# Patient Record
Sex: MALE | Race: WHITE | Employment: OTHER | URBAN - METROPOLITAN AREA
[De-identification: names, ages, dates, MRNs, and addresses within clinical notes are randomized per-mention and may not be internally consistent; named-entity substitution may affect disease eponyms.]

---

## 2018-05-17 ENCOUNTER — OFFICE VISIT (OUTPATIENT)
Dept: NON INVASIVE DIAGNOSTICS | Age: 83
End: 2018-05-17
Payer: COMMERCIAL

## 2018-05-17 VITALS
WEIGHT: 159 LBS | HEART RATE: 57 BPM | HEIGHT: 66 IN | DIASTOLIC BLOOD PRESSURE: 70 MMHG | BODY MASS INDEX: 25.55 KG/M2 | SYSTOLIC BLOOD PRESSURE: 122 MMHG | RESPIRATION RATE: 16 BRPM

## 2018-05-17 DIAGNOSIS — R42 DIZZINESS: ICD-10-CM

## 2018-05-17 DIAGNOSIS — I44.0 1ST DEGREE AV BLOCK: Primary | ICD-10-CM

## 2018-05-17 DIAGNOSIS — I35.0 MODERATE AORTIC STENOSIS BY PRIOR ECHOCARDIOGRAM: ICD-10-CM

## 2018-05-17 PROCEDURE — 99212 OFFICE O/P EST SF 10 MIN: CPT | Performed by: INTERNAL MEDICINE

## 2018-05-17 PROCEDURE — 93000 ELECTROCARDIOGRAM COMPLETE: CPT | Performed by: INTERNAL MEDICINE

## 2018-05-17 RX ORDER — MULTIVIT WITH MINERALS/LUTEIN
1000 TABLET ORAL DAILY
COMMUNITY

## 2018-11-05 ENCOUNTER — HOSPITAL ENCOUNTER (EMERGENCY)
Age: 83
Discharge: HOME | End: 2018-11-05
Attending: EMERGENCY MEDICINE
Payer: MEDICARE

## 2018-11-05 ENCOUNTER — APPOINTMENT (OUTPATIENT)
Dept: GENERAL RADIOLOGY | Age: 83
End: 2018-11-05
Payer: MEDICARE

## 2018-11-05 VITALS
HEART RATE: 60 BPM | RESPIRATION RATE: 16 BRPM | SYSTOLIC BLOOD PRESSURE: 164 MMHG | TEMPERATURE: 96.9 F | HEIGHT: 66 IN | OXYGEN SATURATION: 96 % | WEIGHT: 156 LBS | DIASTOLIC BLOOD PRESSURE: 90 MMHG | BODY MASS INDEX: 25.07 KG/M2

## 2018-11-05 DIAGNOSIS — S22.41XA CLOSED FRACTURE OF MULTIPLE RIBS OF RIGHT SIDE, INITIAL ENCOUNTER: Primary | ICD-10-CM

## 2018-11-05 LAB
ALBUMIN SERPL-MCNC: 3.8 G/DL (ref 3.5–5.2)
ALP BLD-CCNC: 76 U/L (ref 40–129)
ALT SERPL-CCNC: 6 U/L (ref 0–40)
ANION GAP SERPL CALCULATED.3IONS-SCNC: 13 MMOL/L (ref 7–16)
AST SERPL-CCNC: 15 U/L (ref 0–39)
BASOPHILS ABSOLUTE: 0.05 E9/L (ref 0–0.2)
BASOPHILS RELATIVE PERCENT: 0.9 % (ref 0–2)
BILIRUB SERPL-MCNC: 0.6 MG/DL (ref 0–1.2)
BUN BLDV-MCNC: 22 MG/DL (ref 8–23)
CALCIUM SERPL-MCNC: 9.2 MG/DL (ref 8.6–10.2)
CHLORIDE BLD-SCNC: 107 MMOL/L (ref 98–107)
CO2: 21 MMOL/L (ref 22–29)
CREAT SERPL-MCNC: 1 MG/DL (ref 0.7–1.2)
EKG ATRIAL RATE: 75 BPM
EKG P AXIS: 66 DEGREES
EKG P-R INTERVAL: 232 MS
EKG Q-T INTERVAL: 378 MS
EKG QRS DURATION: 86 MS
EKG QTC CALCULATION (BAZETT): 422 MS
EKG R AXIS: 47 DEGREES
EKG T AXIS: 71 DEGREES
EKG VENTRICULAR RATE: 75 BPM
EOSINOPHILS ABSOLUTE: 0.28 E9/L (ref 0.05–0.5)
EOSINOPHILS RELATIVE PERCENT: 4.8 % (ref 0–6)
GFR AFRICAN AMERICAN: >60
GFR NON-AFRICAN AMERICAN: >60 ML/MIN/1.73
GLUCOSE BLD-MCNC: 154 MG/DL (ref 74–99)
HCT VFR BLD CALC: 38.7 % (ref 37–54)
HEMOGLOBIN: 12.6 G/DL (ref 12.5–16.5)
IMMATURE GRANULOCYTES #: 0.01 E9/L
IMMATURE GRANULOCYTES %: 0.2 % (ref 0–5)
LYMPHOCYTES ABSOLUTE: 2.02 E9/L (ref 1.5–4)
LYMPHOCYTES RELATIVE PERCENT: 34.7 % (ref 20–42)
MCH RBC QN AUTO: 30.1 PG (ref 26–35)
MCHC RBC AUTO-ENTMCNC: 32.6 % (ref 32–34.5)
MCV RBC AUTO: 92.4 FL (ref 80–99.9)
MONOCYTES ABSOLUTE: 0.54 E9/L (ref 0.1–0.95)
MONOCYTES RELATIVE PERCENT: 9.3 % (ref 2–12)
NEUTROPHILS ABSOLUTE: 2.92 E9/L (ref 1.8–7.3)
NEUTROPHILS RELATIVE PERCENT: 50.1 % (ref 43–80)
PDW BLD-RTO: 13.4 FL (ref 11.5–15)
PLATELET # BLD: 242 E9/L (ref 130–450)
PMV BLD AUTO: 9.7 FL (ref 7–12)
POTASSIUM SERPL-SCNC: 4 MMOL/L (ref 3.5–5)
RBC # BLD: 4.19 E12/L (ref 3.8–5.8)
SODIUM BLD-SCNC: 141 MMOL/L (ref 132–146)
TOTAL PROTEIN: 6.6 G/DL (ref 6.4–8.3)
TROPONIN: <0.01 NG/ML (ref 0–0.03)
WBC # BLD: 5.8 E9/L (ref 4.5–11.5)

## 2018-11-05 PROCEDURE — 93005 ELECTROCARDIOGRAM TRACING: CPT | Performed by: NURSE PRACTITIONER

## 2018-11-05 PROCEDURE — 99285 EMERGENCY DEPT VISIT HI MDM: CPT

## 2018-11-05 PROCEDURE — 36415 COLL VENOUS BLD VENIPUNCTURE: CPT

## 2018-11-05 PROCEDURE — 71101 X-RAY EXAM UNILAT RIBS/CHEST: CPT

## 2018-11-05 PROCEDURE — 84484 ASSAY OF TROPONIN QUANT: CPT

## 2018-11-05 PROCEDURE — 80053 COMPREHEN METABOLIC PANEL: CPT

## 2018-11-05 PROCEDURE — 6370000000 HC RX 637 (ALT 250 FOR IP): Performed by: EMERGENCY MEDICINE

## 2018-11-05 PROCEDURE — 85025 COMPLETE CBC W/AUTO DIFF WBC: CPT

## 2018-11-05 RX ORDER — IBUPROFEN 400 MG/1
400 TABLET ORAL ONCE
Status: COMPLETED | OUTPATIENT
Start: 2018-11-05 | End: 2018-11-05

## 2018-11-05 RX ORDER — HYDROCODONE BITARTRATE AND ACETAMINOPHEN 5; 325 MG/1; MG/1
1 TABLET ORAL ONCE
Status: COMPLETED | OUTPATIENT
Start: 2018-11-05 | End: 2018-11-05

## 2018-11-05 RX ORDER — LIDOCAINE 50 MG/G
3 PATCH TOPICAL EVERY 24 HOURS
Qty: 90 PATCH | Refills: 0 | Status: SHIPPED | OUTPATIENT
Start: 2018-11-05 | End: 2020-03-10

## 2018-11-05 RX ORDER — HYDROCODONE BITARTRATE AND ACETAMINOPHEN 5; 325 MG/1; MG/1
1 TABLET ORAL EVERY 6 HOURS PRN
Qty: 15 TABLET | Refills: 0 | Status: SHIPPED | OUTPATIENT
Start: 2018-11-05 | End: 2018-11-09

## 2018-11-05 RX ORDER — LIDOCAINE 50 MG/G
1 PATCH TOPICAL DAILY
Status: DISCONTINUED | OUTPATIENT
Start: 2018-11-05 | End: 2018-11-05 | Stop reason: HOSPADM

## 2018-11-05 RX ADMIN — HYDROCODONE BITARTRATE AND ACETAMINOPHEN 1 TABLET: 5; 325 TABLET ORAL at 13:14

## 2018-11-05 RX ADMIN — IBUPROFEN 400 MG: 400 TABLET ORAL at 13:14

## 2018-11-05 ASSESSMENT — PAIN DESCRIPTION - DESCRIPTORS: DESCRIPTORS: ACHING

## 2018-11-05 ASSESSMENT — PAIN SCALES - GENERAL: PAINLEVEL_OUTOF10: 7

## 2018-11-05 ASSESSMENT — PAIN DESCRIPTION - PAIN TYPE: TYPE: ACUTE PAIN

## 2018-11-05 ASSESSMENT — PAIN DESCRIPTION - ORIENTATION: ORIENTATION: RIGHT

## 2018-11-05 ASSESSMENT — PAIN DESCRIPTION - PROGRESSION: CLINICAL_PROGRESSION: GRADUALLY WORSENING

## 2018-11-05 ASSESSMENT — PAIN DESCRIPTION - LOCATION: LOCATION: CHEST

## 2018-11-05 ASSESSMENT — PAIN DESCRIPTION - FREQUENCY: FREQUENCY: CONTINUOUS

## 2018-11-05 ASSESSMENT — PAIN SCALES - WONG BAKER: WONGBAKER_NUMERICALRESPONSE: 8

## 2018-11-29 ENCOUNTER — HOSPITAL ENCOUNTER (OUTPATIENT)
Dept: GENERAL RADIOLOGY | Age: 83
Discharge: HOME | End: 2018-12-01
Payer: MEDICARE

## 2018-11-29 DIAGNOSIS — M85.9 DISORDER OF BONE DENSITY AND STRUCTURE, UNSPECIFIED: ICD-10-CM

## 2018-11-29 PROCEDURE — 77080 DXA BONE DENSITY AXIAL: CPT

## 2020-03-09 ENCOUNTER — APPOINTMENT (OUTPATIENT)
Dept: GENERAL RADIOLOGY | Age: 85
DRG: 446 | End: 2020-03-09
Payer: MEDICARE

## 2020-03-09 LAB
ALBUMIN SERPL-MCNC: 3.8 G/DL (ref 3.5–5.2)
ALP BLD-CCNC: 206 U/L (ref 40–129)
ALT SERPL-CCNC: 125 U/L (ref 0–40)
ANION GAP SERPL CALCULATED.3IONS-SCNC: 14 MMOL/L (ref 7–16)
AST SERPL-CCNC: 156 U/L (ref 0–39)
BASOPHILS ABSOLUTE: 0.03 E9/L (ref 0–0.2)
BASOPHILS RELATIVE PERCENT: 0.4 % (ref 0–2)
BILIRUB SERPL-MCNC: 2.5 MG/DL (ref 0–1.2)
BUN BLDV-MCNC: 26 MG/DL (ref 8–23)
CALCIUM SERPL-MCNC: 9 MG/DL (ref 8.6–10.2)
CHLORIDE BLD-SCNC: 105 MMOL/L (ref 98–107)
CO2: 21 MMOL/L (ref 22–29)
CREAT SERPL-MCNC: 1.1 MG/DL (ref 0.7–1.2)
EOSINOPHILS ABSOLUTE: 0.11 E9/L (ref 0.05–0.5)
EOSINOPHILS RELATIVE PERCENT: 1.6 % (ref 0–6)
GFR AFRICAN AMERICAN: >60
GFR NON-AFRICAN AMERICAN: >60 ML/MIN/1.73
GLUCOSE BLD-MCNC: 137 MG/DL (ref 74–99)
HCT VFR BLD CALC: 42.2 % (ref 37–54)
HEMOGLOBIN: 13.3 G/DL (ref 12.5–16.5)
IMMATURE GRANULOCYTES #: 0.04 E9/L
IMMATURE GRANULOCYTES %: 0.6 % (ref 0–5)
INFLUENZA A BY PCR: NOT DETECTED
INFLUENZA B BY PCR: NOT DETECTED
LACTIC ACID: 3 MMOL/L (ref 0.5–2.2)
LYMPHOCYTES ABSOLUTE: 0.73 E9/L (ref 1.5–4)
LYMPHOCYTES RELATIVE PERCENT: 10.4 % (ref 20–42)
MAGNESIUM: 1.9 MG/DL (ref 1.6–2.6)
MCH RBC QN AUTO: 29.8 PG (ref 26–35)
MCHC RBC AUTO-ENTMCNC: 31.5 % (ref 32–34.5)
MCV RBC AUTO: 94.6 FL (ref 80–99.9)
MONOCYTES ABSOLUTE: 0.55 E9/L (ref 0.1–0.95)
MONOCYTES RELATIVE PERCENT: 7.8 % (ref 2–12)
NEUTROPHILS ABSOLUTE: 5.59 E9/L (ref 1.8–7.3)
NEUTROPHILS RELATIVE PERCENT: 79.2 % (ref 43–80)
PDW BLD-RTO: 13.3 FL (ref 11.5–15)
PLATELET # BLD: 216 E9/L (ref 130–450)
PMV BLD AUTO: 9.7 FL (ref 7–12)
POTASSIUM SERPL-SCNC: 4.1 MMOL/L (ref 3.5–5)
RBC # BLD: 4.46 E12/L (ref 3.8–5.8)
SODIUM BLD-SCNC: 140 MMOL/L (ref 132–146)
TOTAL PROTEIN: 7.2 G/DL (ref 6.4–8.3)
TROPONIN: 0.02 NG/ML (ref 0–0.03)
WBC # BLD: 7.1 E9/L (ref 4.5–11.5)

## 2020-03-09 PROCEDURE — 93005 ELECTROCARDIOGRAM TRACING: CPT | Performed by: NURSE PRACTITIONER

## 2020-03-09 PROCEDURE — 84484 ASSAY OF TROPONIN QUANT: CPT

## 2020-03-09 PROCEDURE — 85025 COMPLETE CBC W/AUTO DIFF WBC: CPT

## 2020-03-09 PROCEDURE — 83735 ASSAY OF MAGNESIUM: CPT

## 2020-03-09 PROCEDURE — 80053 COMPREHEN METABOLIC PANEL: CPT

## 2020-03-09 PROCEDURE — 87502 INFLUENZA DNA AMP PROBE: CPT

## 2020-03-09 PROCEDURE — 83605 ASSAY OF LACTIC ACID: CPT

## 2020-03-09 PROCEDURE — 87040 BLOOD CULTURE FOR BACTERIA: CPT

## 2020-03-09 PROCEDURE — 71046 X-RAY EXAM CHEST 2 VIEWS: CPT

## 2020-03-09 NOTE — ED NOTES
FIRST PROVIDER CONTACT ASSESSMENT NOTE      Department of Emergency Medicine   ED  First Provider Note   3/9/20  6:03 PM EDT    Chief Complaint: Generalized Body Aches (increased fatigue fever vomiting and diarrhea since yesterday per the family worse today )      History of Present Illness: Elizabeth Solares is a 80 y.o. male who presents to the ED by private car for generalized body aches, fevers, vomiting, diarrhea all that started yesterday. No documented temperature at home but they report that he did feel feverish, warm to touch. Also with widespread body aches and generalized weakness. No specific chest pain, shortness of breath or abdominal pain  Focused Screening Exam:  Constitutional:  Alert, appears stated age and is in no distress. *ALLERGIES*     Patient has no known allergies.      ED Triage Vitals [03/09/20 1730]   BP Temp Temp src Pulse Resp SpO2 Height Weight   (!) 154/80 98.8 °F (37.1 °C) -- 87 20 93 % 5' 6\" (1.676 m) 156 lb (70.8 kg)        Initial Plan of Care:  Initiate Treatment-Testing, Proceed toTreatment Area When Bed Available for ED Attending/MLP to Continue Care      -----------------END OF FIRST PROVIDER CONTACT ASSESSMENT NOTE--------------  Electronically signed by ELLY Rodriguez CNP   DD: 3/9/20         ELLY Rodriguez CNP  03/09/20 1808

## 2020-03-10 ENCOUNTER — APPOINTMENT (OUTPATIENT)
Dept: CT IMAGING | Age: 85
DRG: 446 | End: 2020-03-10
Payer: MEDICARE

## 2020-03-10 ENCOUNTER — HOSPITAL ENCOUNTER (INPATIENT)
Age: 85
LOS: 4 days | Discharge: HOME HEALTH | DRG: 446 | End: 2020-03-14
Attending: EMERGENCY MEDICINE | Admitting: INTERNAL MEDICINE
Payer: MEDICARE

## 2020-03-10 PROBLEM — K80.70 CHOLELITHIASIS WITH CHOLEDOCHOLITHIASIS: Status: ACTIVE | Noted: 2020-03-10

## 2020-03-10 LAB
ALBUMIN SERPL-MCNC: 3.3 G/DL (ref 3.5–5.2)
ALP BLD-CCNC: 221 U/L (ref 40–129)
ALT SERPL-CCNC: 123 U/L (ref 0–40)
AMMONIA: 29 UMOL/L (ref 16–60)
ANION GAP SERPL CALCULATED.3IONS-SCNC: 14 MMOL/L (ref 7–16)
AST SERPL-CCNC: 150 U/L (ref 0–39)
BACTERIA: ABNORMAL /HPF
BASOPHILS ABSOLUTE: 0.01 E9/L (ref 0–0.2)
BASOPHILS RELATIVE PERCENT: 0.1 % (ref 0–2)
BILIRUB SERPL-MCNC: 3.8 MG/DL (ref 0–1.2)
BILIRUBIN DIRECT: 3.5 MG/DL (ref 0–0.3)
BILIRUBIN URINE: ABNORMAL
BILIRUBIN, INDIRECT: 0.3 MG/DL (ref 0–1)
BLOOD, URINE: ABNORMAL
BUN BLDV-MCNC: 26 MG/DL (ref 8–23)
CALCIUM SERPL-MCNC: 8.6 MG/DL (ref 8.6–10.2)
CASTS: ABNORMAL /LPF
CHLORIDE BLD-SCNC: 108 MMOL/L (ref 98–107)
CLARITY: CLEAR
CO2: 20 MMOL/L (ref 22–29)
COLOR: ABNORMAL
CREAT SERPL-MCNC: 1.1 MG/DL (ref 0.7–1.2)
EKG ATRIAL RATE: 95 BPM
EKG P-R INTERVAL: 212 MS
EKG Q-T INTERVAL: 322 MS
EKG QRS DURATION: 96 MS
EKG QTC CALCULATION (BAZETT): 404 MS
EKG R AXIS: -41 DEGREES
EKG T AXIS: 71 DEGREES
EKG VENTRICULAR RATE: 95 BPM
EOSINOPHILS ABSOLUTE: 0.02 E9/L (ref 0.05–0.5)
EOSINOPHILS RELATIVE PERCENT: 0.2 % (ref 0–6)
GFR AFRICAN AMERICAN: >60
GFR NON-AFRICAN AMERICAN: >60 ML/MIN/1.73
GLUCOSE BLD-MCNC: 97 MG/DL (ref 74–99)
GLUCOSE URINE: NEGATIVE MG/DL
HCT VFR BLD CALC: 37.1 % (ref 37–54)
HEMOGLOBIN: 11.8 G/DL (ref 12.5–16.5)
IMMATURE GRANULOCYTES #: 0.04 E9/L
IMMATURE GRANULOCYTES %: 0.5 % (ref 0–5)
INR BLD: 1.2
KETONES, URINE: NEGATIVE MG/DL
LACTIC ACID: 1.9 MMOL/L (ref 0.5–2.2)
LEUKOCYTE ESTERASE, URINE: ABNORMAL
LIPASE: 7 U/L (ref 13–60)
LIPASE: 7 U/L (ref 13–60)
LYMPHOCYTES ABSOLUTE: 1.8 E9/L (ref 1.5–4)
LYMPHOCYTES RELATIVE PERCENT: 20.7 % (ref 20–42)
MCH RBC QN AUTO: 29.9 PG (ref 26–35)
MCHC RBC AUTO-ENTMCNC: 31.8 % (ref 32–34.5)
MCV RBC AUTO: 93.9 FL (ref 80–99.9)
MONOCYTES ABSOLUTE: 1.13 E9/L (ref 0.1–0.95)
MONOCYTES RELATIVE PERCENT: 13 % (ref 2–12)
NEUTROPHILS ABSOLUTE: 5.69 E9/L (ref 1.8–7.3)
NEUTROPHILS RELATIVE PERCENT: 65.5 % (ref 43–80)
NITRITE, URINE: NEGATIVE
OCCULT BLOOD SCREENING: NORMAL
PDW BLD-RTO: 13.5 FL (ref 11.5–15)
PH UA: 5 (ref 5–9)
PLATELET # BLD: 196 E9/L (ref 130–450)
PMV BLD AUTO: 9.7 FL (ref 7–12)
POTASSIUM REFLEX MAGNESIUM: 4.3 MMOL/L (ref 3.5–5)
PROTEIN UA: NEGATIVE MG/DL
PROTHROMBIN TIME: 13.1 SEC (ref 9.3–12.4)
RBC # BLD: 3.95 E12/L (ref 3.8–5.8)
RBC UA: ABNORMAL /HPF (ref 0–2)
ROTAVIRUS ANTIGEN: NORMAL
SODIUM BLD-SCNC: 142 MMOL/L (ref 132–146)
SPECIFIC GRAVITY UA: 1.02 (ref 1–1.03)
TOTAL PROTEIN: 6.2 G/DL (ref 6.4–8.3)
TROPONIN: <0.01 NG/ML (ref 0–0.03)
UROBILINOGEN, URINE: 2 E.U./DL
WBC # BLD: 8.7 E9/L (ref 4.5–11.5)
WBC UA: ABNORMAL /HPF (ref 0–5)

## 2020-03-10 PROCEDURE — 80048 BASIC METABOLIC PNL TOTAL CA: CPT

## 2020-03-10 PROCEDURE — 74177 CT ABD & PELVIS W/CONTRAST: CPT

## 2020-03-10 PROCEDURE — 2580000003 HC RX 258: Performed by: SURGERY

## 2020-03-10 PROCEDURE — 2580000003 HC RX 258: Performed by: EMERGENCY MEDICINE

## 2020-03-10 PROCEDURE — 84484 ASSAY OF TROPONIN QUANT: CPT

## 2020-03-10 PROCEDURE — 6360000004 HC RX CONTRAST MEDICATION: Performed by: RADIOLOGY

## 2020-03-10 PROCEDURE — 82705 FATS/LIPIDS FECES QUAL: CPT

## 2020-03-10 PROCEDURE — 85025 COMPLETE CBC W/AUTO DIFF WBC: CPT

## 2020-03-10 PROCEDURE — 6360000002 HC RX W HCPCS: Performed by: NURSE PRACTITIONER

## 2020-03-10 PROCEDURE — 83605 ASSAY OF LACTIC ACID: CPT

## 2020-03-10 PROCEDURE — 6360000002 HC RX W HCPCS: Performed by: EMERGENCY MEDICINE

## 2020-03-10 PROCEDURE — 99222 1ST HOSP IP/OBS MODERATE 55: CPT | Performed by: SURGERY

## 2020-03-10 PROCEDURE — 2500000003 HC RX 250 WO HCPCS: Performed by: SURGERY

## 2020-03-10 PROCEDURE — 87425 ROTAVIRUS AG IA: CPT

## 2020-03-10 PROCEDURE — 85610 PROTHROMBIN TIME: CPT

## 2020-03-10 PROCEDURE — 2580000003 HC RX 258: Performed by: NURSE PRACTITIONER

## 2020-03-10 PROCEDURE — 99285 EMERGENCY DEPT VISIT HI MDM: CPT

## 2020-03-10 PROCEDURE — G0328 FECAL BLOOD SCRN IMMUNOASSAY: HCPCS

## 2020-03-10 PROCEDURE — 93010 ELECTROCARDIOGRAM REPORT: CPT | Performed by: INTERNAL MEDICINE

## 2020-03-10 PROCEDURE — 83690 ASSAY OF LIPASE: CPT

## 2020-03-10 PROCEDURE — 81001 URINALYSIS AUTO W/SCOPE: CPT

## 2020-03-10 PROCEDURE — 82140 ASSAY OF AMMONIA: CPT

## 2020-03-10 PROCEDURE — 2500000003 HC RX 250 WO HCPCS: Performed by: EMERGENCY MEDICINE

## 2020-03-10 PROCEDURE — 80076 HEPATIC FUNCTION PANEL: CPT

## 2020-03-10 PROCEDURE — 2060000000 HC ICU INTERMEDIATE R&B

## 2020-03-10 PROCEDURE — 96374 THER/PROPH/DIAG INJ IV PUSH: CPT

## 2020-03-10 RX ORDER — SODIUM CHLORIDE, SODIUM LACTATE, POTASSIUM CHLORIDE, CALCIUM CHLORIDE 600; 310; 30; 20 MG/100ML; MG/100ML; MG/100ML; MG/100ML
INJECTION, SOLUTION INTRAVENOUS CONTINUOUS
Status: DISCONTINUED | OUTPATIENT
Start: 2020-03-10 | End: 2020-03-12

## 2020-03-10 RX ORDER — 0.9 % SODIUM CHLORIDE 0.9 %
1000 INTRAVENOUS SOLUTION INTRAVENOUS ONCE
Status: COMPLETED | OUTPATIENT
Start: 2020-03-10 | End: 2020-03-10

## 2020-03-10 RX ORDER — METOCLOPRAMIDE HYDROCHLORIDE 5 MG/ML
10 INJECTION INTRAMUSCULAR; INTRAVENOUS ONCE
Status: COMPLETED | OUTPATIENT
Start: 2020-03-10 | End: 2020-03-10

## 2020-03-10 RX ORDER — ONDANSETRON 2 MG/ML
4 INJECTION INTRAMUSCULAR; INTRAVENOUS EVERY 6 HOURS PRN
Status: DISCONTINUED | OUTPATIENT
Start: 2020-03-10 | End: 2020-03-14 | Stop reason: HOSPADM

## 2020-03-10 RX ORDER — PANTOPRAZOLE SODIUM 40 MG/1
40 TABLET, DELAYED RELEASE ORAL
Status: DISCONTINUED | OUTPATIENT
Start: 2020-03-11 | End: 2020-03-10

## 2020-03-10 RX ORDER — VITS A,C,E/LUTEIN/MINERALS 300MCG-200
2 TABLET ORAL DAILY
Status: DISCONTINUED | OUTPATIENT
Start: 2020-03-11 | End: 2020-03-14 | Stop reason: HOSPADM

## 2020-03-10 RX ORDER — MULTIVIT WITH MINERALS/LUTEIN
1000 TABLET ORAL DAILY
Status: DISCONTINUED | OUTPATIENT
Start: 2020-03-11 | End: 2020-03-14 | Stop reason: HOSPADM

## 2020-03-10 RX ORDER — PRAVASTATIN SODIUM 20 MG
40 TABLET ORAL NIGHTLY
Status: DISCONTINUED | OUTPATIENT
Start: 2020-03-10 | End: 2020-03-14 | Stop reason: HOSPADM

## 2020-03-10 RX ORDER — PANTOPRAZOLE SODIUM 40 MG/1
40 TABLET, DELAYED RELEASE ORAL
Status: DISCONTINUED | OUTPATIENT
Start: 2020-03-11 | End: 2020-03-14 | Stop reason: HOSPADM

## 2020-03-10 RX ORDER — POLYVINYL ALCOHOL 14 MG/ML
1 SOLUTION/ DROPS OPHTHALMIC 3 TIMES DAILY
Status: DISCONTINUED | OUTPATIENT
Start: 2020-03-10 | End: 2020-03-14 | Stop reason: HOSPADM

## 2020-03-10 RX ORDER — VITAMIN B COMPLEX
1000 TABLET ORAL 2 TIMES DAILY
Status: DISCONTINUED | OUTPATIENT
Start: 2020-03-10 | End: 2020-03-14 | Stop reason: HOSPADM

## 2020-03-10 RX ADMIN — IOPAMIDOL 110 ML: 755 INJECTION, SOLUTION INTRAVENOUS at 05:01

## 2020-03-10 RX ADMIN — SODIUM CHLORIDE 1000 ML: 9 INJECTION, SOLUTION INTRAVENOUS at 04:01

## 2020-03-10 RX ADMIN — METRONIDAZOLE 500 MG: 500 INJECTION, SOLUTION INTRAVENOUS at 16:31

## 2020-03-10 RX ADMIN — METOCLOPRAMIDE 10 MG: 5 INJECTION, SOLUTION INTRAMUSCULAR; INTRAVENOUS at 04:01

## 2020-03-10 RX ADMIN — CEFTRIAXONE SODIUM 1 G: 1 INJECTION, POWDER, FOR SOLUTION INTRAMUSCULAR; INTRAVENOUS at 07:11

## 2020-03-10 RX ADMIN — METRONIDAZOLE 500 MG: 500 INJECTION, SOLUTION INTRAVENOUS at 07:31

## 2020-03-10 RX ADMIN — SODIUM CHLORIDE, POTASSIUM CHLORIDE, SODIUM LACTATE AND CALCIUM CHLORIDE: 600; 310; 30; 20 INJECTION, SOLUTION INTRAVENOUS at 13:24

## 2020-03-10 ASSESSMENT — PAIN SCALES - GENERAL: PAINLEVEL_OUTOF10: 0

## 2020-03-10 NOTE — H&P
TONSILLECTOMY AND ADENOIDECTOMY      UPPER GASTROINTESTINAL ENDOSCOPY          Medications Prior to Admission     Prior to Admission medications    Medication Sig Start Date End Date Taking? Authorizing Provider   vitamin E 1000 units capsule Take 1,000 Units by mouth daily   Yes Historical Provider, MD   bevacizumab (AVASTIN) 2.5 mg/0.1 ml syringe Inject 2.5 mg into the eye once Every 6 weeks in the left eye   Yes Historical Provider, MD   omeprazole (PRILOSEC) 20 MG capsule Take 20 mg by mouth Daily    Yes Historical Provider, MD   pravastatin (PRAVACHOL) 40 MG tablet Take 40 mg by mouth nightly   Yes Historical Provider, MD   Multiple Vitamins-Minerals (MULTIVITAMIN PO) Take 1 tablet by mouth daily    Yes Historical Provider, MD   Multiple Vitamins-Minerals (PRESERVISION AREDS) CAPS Take 1 capsule by mouth daily   Yes Historical Provider, MD   carboxymethylcellulose (REFRESH CELLUVISC) 1 % ophthalmic solution Place 1 drop into both eyes 3 times daily   Yes Historical Provider, MD   Vitamin D (CHOLECALCIFEROL) 1000 UNITS CAPS capsule Take 1,000 Units by mouth 2 times daily   Yes Historical Provider, MD        Allergies   Patient has no known allergies.     Social History     Social History     Tobacco History     Smoking Status  Former Smoker    Smokeless Tobacco Use  Never Used    Tobacco Comment  quit smoking cigars a very long time ago          Alcohol History     Alcohol Use Status  No          Drug Use     Drug Use Status  No          Sexual Activity     Sexually Active  Not Asked                Family History     Family History   Problem Relation Age of Onset    Heart Disease Mother     Heart Disease Father     Heart Disease Brother     Heart Attack Brother        Review of Systems   Pertinent ROS listed in HPI, all others negative    Physical Exam   BP (!) 117/57   Pulse 62   Temp 98.8 °F (37.1 °C)   Resp 18   Ht 5' 6\" (1.676 m)   Wt 156 lb (70.8 kg)   SpO2 96%   BMI 25.18 kg/m²     GENERAL:  No COMPARISON: No relevant prior studies available. FINDINGS: Lungs: Mild bibasilar atelectasis. Mediastinum: Moderate size hiatal hernia. Liver: Normal. No mass. Gallbladder and bile ducts: Distended gallbladder. Gallstones are noted. Mild enhancement of the gallbladder. Dilatation of the extrahepatic bile ducts with possible stone or sludge within the distal common duct. Pancreas: Normal. No ductal dilation. Spleen: Normal. No splenomegaly. Adrenals: Normal. No mass. Kidneys and ureters: Normal. No hydronephrosis. Stomach and bowel: There is nonspecific mucosal thickening of the sigmoid colon. This most likely is from incomplete distention of the colon. Colonic diverticulosis. No evidence of diverticulitis. No dilated loops of bowel. Appendix: A normal appendix is identified. Intraperitoneal space: Unremarkable. No free air. No significant fluid collection. Vasculature: The vasculature demonstrates diffuse moderate atherosclerotic calcification. Lymph nodes: Unremarkable. No enlarged lymph nodes. Bladder: Unremarkable as visualized. Reproductive: Diffuse prostate hypertrophy noted. Bones/joints: Postoperative change of the left hip. Severe multilevel degenerative disc disease with vacuum disc phenomenon. Mild scoliosis. Soft tissues: Unremarkable. Gallstones are noted within a distended gallbladder. Possible stone within the distal common duct. If gallbladder disease is a concern clinically recommend correlation with gallbladder ultrasound. There is nonspecific mucosal thickening of the sigmoid colon. This most likely is from  incomplete distention of the colon. However cannot exclude mild colitis. No bowel obstruction or bowel perforation.  This report has been electronically signed by Devin Green MD.      Assessment      Hospital Problems           Last Modified POA    Cholelithiasis with choledocholithiasis 3/10/2020 Yes          80 y.o. male with choledocholithiasis    Plan   - will consult Dr Jamshid Ferrer for ERCP duct clearance  - rocephin/flagyl for cholangitis prophylaxis  - ok for clear liquid diet today, continue low IVF because of vomiting  - NPO at midnight  - we do not recommend cholecystectomy in the setting of advanced age and no other gallbladder stones     Plan was discussed with Dr. Joanie Rueda.     Electronically signed by Pete Christianson MD on 3/10/20 at 11:40 AM EDT

## 2020-03-10 NOTE — CONSULTS
COMPARISON: No relevant prior studies available. FINDINGS: Lungs: Mild bibasilar atelectasis. Mediastinum: Moderate size hiatal hernia. Liver: Normal. No mass. Gallbladder and bile ducts: Distended gallbladder. Gallstones are noted. Mild enhancement of the gallbladder. Dilatation of the extrahepatic bile ducts with possible stone or sludge within the distal common duct. Pancreas: Normal. No ductal dilation. Spleen: Normal. No splenomegaly. Adrenals: Normal. No mass. Kidneys and ureters: Normal. No hydronephrosis. Stomach and bowel: There is nonspecific mucosal thickening of the sigmoid colon. This most likely is from incomplete distention of the colon. Colonic diverticulosis. No evidence of diverticulitis. No dilated loops of bowel. Appendix: A normal appendix is identified. Intraperitoneal space: Unremarkable. No free air. No significant fluid collection. Vasculature: The vasculature demonstrates diffuse moderate atherosclerotic calcification. Lymph nodes: Unremarkable. No enlarged lymph nodes. Bladder: Unremarkable as visualized. Reproductive: Diffuse prostate hypertrophy noted. Bones/joints: Postoperative change of the left hip. Severe multilevel degenerative disc disease with vacuum disc phenomenon. Mild scoliosis. Soft tissues: Unremarkable. Gallstones are noted within a distended gallbladder. Possible stone within the distal common duct. If gallbladder disease is a concern clinically recommend correlation with gallbladder ultrasound. There is nonspecific mucosal thickening of the sigmoid colon. This most likely is from  incomplete distention of the colon. However cannot exclude mild colitis. No bowel obstruction or bowel perforation.  This report has been electronically signed by Bethany Smith MD.      Assessment      Hospital Problems           Last Modified POA    Cholelithiasis with choledocholithiasis 3/10/2020 Yes          80 y.o. male with choledocholithiasis    Plan   - duct clearance via cardiovascular or respiratory issues, negative for reaction to anesthesia. Recent Labs     03/09/20  1829 03/10/20  1157   WBC 7.1 8.7   HGB 13.3 11.8*   HCT 42.2 37.1   MCV 94.6 93.9    196       Recent Labs     03/09/20  1829 03/10/20  1157    142   K 4.1 4.3   CO2 21* 20*   BUN 26* 26*   CREATININE 1.1 1.1       Recent Labs     03/09/20  1829 03/10/20  0345 03/10/20  1157   PROT 7.2  --  6.2*   INR  --   --  1.2   LIPASE  --  7*  7*  --          Intake/Output Summary (Last 24 hours) at 3/10/2020 1251  Last data filed at 3/10/2020 3341  Gross per 24 hour   Intake 1000 ml   Output --   Net 1000 ml       I have reviewed relevant labs from this admission and interpretation is included in my assessment and plan      Review of Systems  A complete 10 system review was performed and are otherwise negative unless mentioned in the above HPI. Specific negatives are listed below but may not include all those reviewed.     General ROS: negative obtundation, AMS  ENT ROS: negative rhinorrhea, epistaxis  Allergy and Immunology ROS: negative itchy/watery eyes or nasal congestion  Hematological and Lymphatic ROS: negative spontaneous bleeding or bruising  Endocrine ROS: negative  lethargy, mood swings, palpitations or polydipsia/polyuria  Respiratory ROS: negative sputum changes, stridor, tachypnea or wheezing  Cardiovascular ROS: negative for - loss of consciousness, murmur or orthopnea  Gastrointestinal ROS: negative for - hematochezia or hematemesis  Genito-Urinary ROS: negative for -  genital discharge or hematuria  Musculoskeletal ROS: negative for - focal weakness, gangrene  Psych/Neuro ROS: negative for - visual or auditory hallucinations, suicidal ideation      Physical exam:   BP (!) 117/57   Pulse 62   Temp 98.8 °F (37.1 °C)   Resp 18   Ht 5' 6\" (1.676 m)   Wt 156 lb (70.8 kg)   SpO2 96%   BMI 25.18 kg/m²   General appearance:  NAD, appears stated age  Head: NCAT, PERRLA, EOMI, Scleral icterus  Neck: supple, no masses, trachea midline  Lungs: Equal chest rise bilateral, no retractions, no wheezing  Heart: Reg rate  Abdomen: soft, nontender, non distended. Skin; warm and dry, no cyanosis  Gu: no cva tenderness  Extremities: atraumatic, no focal motor deficits, no open wounds  Psych: No tremor, visual hallucinations    Pathology: n/a    Radiology: I reviewed relevant abdominal imaging from this admission and that available in the EMR including CT abd/pel from 3/10/20. My assessment is choledocholithiasis    Assessment:  Brian Sandoval is a 80 y.o. male with choledocholithiasis    Patient Active Problem List   Diagnosis    Aortic stenosis, moderate    Acute blood loss anemia    Gastrointestinal hemorrhage    Gastroesophageal reflux disease with esophagitis    Vertebral artery occlusion    Dizziness    Moderate aortic stenosis by prior echocardiogram    1st degree AV block    Cholelithiasis with choledocholithiasis       Plan:  ERCP tomorrow  Had long discussion with patient and daughter (Carla Frank). They understand the risk of general anesthesia, bleeding, infection, perforation, prolonged intubation and further procedure. They would like to remain DNR CCA with no chest compressions or prolonged intubation after the immediate perioperative period. They agree to proceed. Time spent reviewing past medical, surgical, social and family history, vitals, nursing assessment and images. Time spent face to face with patient and family counciling and discussing care exceeded 50% of the time of the consult. Additional time spent reviewing images and labs, discussing case with nursing, support staff and other physicians; as well as coordinating care.         Physician Signature: Electronically signed by Dr. Pranay Camilo

## 2020-03-10 NOTE — H&P
mg/dL    Total Protein 7.2 6.4 - 8.3 g/dL    Alb 3.8 3.5 - 5.2 g/dL    Total Bilirubin 2.5 (H) 0.0 - 1.2 mg/dL    Alkaline Phosphatase 206 (H) 40 - 129 U/L     (H) 0 - 40 U/L     (H) 0 - 39 U/L   Magnesium    Collection Time: 03/09/20  6:29 PM   Result Value Ref Range    Magnesium 1.9 1.6 - 2.6 mg/dL   Lactic Acid, Plasma    Collection Time: 03/09/20  6:29 PM   Result Value Ref Range    Lactic Acid 3.0 (H) 0.5 - 2.2 mmol/L   Rapid influenza A/B antigens    Collection Time: 03/09/20  6:29 PM   Result Value Ref Range    Influenza A by PCR Not Detected Not Detected    Influenza B by PCR Not Detected Not Detected   EKG 12 Lead    Collection Time: 03/09/20  6:35 PM   Result Value Ref Range    Ventricular Rate 95 BPM    Atrial Rate 95 BPM    P-R Interval 212 ms    QRS Duration 96 ms    Q-T Interval 322 ms    QTc Calculation (Bazett) 404 ms    R Axis -41 degrees    T Axis 71 degrees   Urinalysis    Collection Time: 03/10/20  2:49 AM   Result Value Ref Range    Color, UA DARK YELLOW (A) Straw/Yellow    Clarity, UA Clear Clear    Glucose, Ur Negative Negative mg/dL    Bilirubin Urine MODERATE (A) Negative    Ketones, Urine Negative Negative mg/dL    Specific Gravity, UA 1.025 1.005 - 1.030    Blood, Urine TRACE-INTACT Negative    pH, UA 5.0 5.0 - 9.0    Protein, UA Negative Negative mg/dL    Urobilinogen, Urine 2.0 (A) <2.0 E.U./dL    Nitrite, Urine Negative Negative    Leukocyte Esterase, Urine TRACE (A) Negative   Blood occult stool screen #1    Collection Time: 03/10/20  2:49 AM   Result Value Ref Range    Occult Blood Screening       Occult Blood test result is negative.   *  *  Normal range: negative     Microscopic Urinalysis    Collection Time: 03/10/20  2:49 AM   Result Value Ref Range    Casts RARE /LPF    WBC, UA 0-1 0 - 5 /HPF    RBC, UA 2-5 0 - 2 /HPF    Bacteria, UA FEW (A) None Seen /HPF   Rotavirus antigen, stool    Collection Time: 03/10/20  2:50 AM   Result Value Ref Range    Rotavirus Lymphocytes % 20.7 20.0 - 42.0 %    Monocytes % 13.0 (H) 2.0 - 12.0 %    Eosinophils % 0.2 0.0 - 6.0 %    Basophils % 0.1 0.0 - 2.0 %    Neutrophils Absolute 5.69 1.80 - 7.30 E9/L    Immature Granulocytes # 0.04 E9/L    Lymphocytes Absolute 1.80 1.50 - 4.00 E9/L    Monocytes Absolute 1.13 (H) 0.10 - 0.95 E9/L    Eosinophils Absolute 0.02 (L) 0.05 - 0.50 E9/L    Basophils Absolute 0.01 0.00 - 0.20 E9/L   Protime-INR    Collection Time: 03/10/20 11:57 AM   Result Value Ref Range    Protime 13.1 (H) 9.3 - 12.4 sec    INR 1.2        CT ABDOMEN PELVIS W IV CONTRAST Additional Contrast? None   Final Result   Gallstones are noted within a distended gallbladder. Possible stone within the    distal common duct. If gallbladder disease is a concern clinically recommend    correlation with gallbladder ultrasound. There is nonspecific mucosal thickening of the sigmoid colon. This most likely    is from  incomplete distention of the colon. However cannot exclude mild    colitis. No bowel obstruction or bowel perforation. This report has been electronically signed by Devin Green MD.      XR CHEST STANDARD (2 VW)   Final Result      1. Hyperinflated lungs, likely indicating COPD. 2. Minimal pulmonary opacities at the lung bases likely represent   atelectasis or scarring. No definite consolidation. ASSESSMENT :      Active Problems:    Cholelithiasis with choledocholithiasis  Resolved Problems:    * No resolved hospital problems.  *    Moderate aortic stenosis by echocardiogram in 2015  Prior history of GI bleeding  Peripheral vascular arterial disease  History of GERD    Plan :    Patient does have a history of at least moderate AS in 2015  If operative intervention contemplated may need repeat echocardiogram  General surgery on the case  ERCP being considered  Empiric antibiotics for cholangitis prophylaxis        Electronically signed by Jamal Gross MD on 3/10/2020 at 3:45

## 2020-03-10 NOTE — CONSULTS
TONSILLECTOMY AND ADENOIDECTOMY      UPPER GASTROINTESTINAL ENDOSCOPY          Medications Prior to Admission     Prior to Admission medications    Medication Sig Start Date End Date Taking? Authorizing Provider   vitamin E 1000 units capsule Take 1,000 Units by mouth daily   Yes Historical Provider, MD   bevacizumab (AVASTIN) 2.5 mg/0.1 ml syringe Inject 2.5 mg into the eye once Every 6 weeks in the left eye   Yes Historical Provider, MD   omeprazole (PRILOSEC) 20 MG capsule Take 20 mg by mouth Daily    Yes Historical Provider, MD   pravastatin (PRAVACHOL) 40 MG tablet Take 40 mg by mouth nightly   Yes Historical Provider, MD   Multiple Vitamins-Minerals (MULTIVITAMIN PO) Take 1 tablet by mouth daily    Yes Historical Provider, MD   Multiple Vitamins-Minerals (PRESERVISION AREDS) CAPS Take 1 capsule by mouth daily   Yes Historical Provider, MD   carboxymethylcellulose (REFRESH CELLUVISC) 1 % ophthalmic solution Place 1 drop into both eyes 3 times daily   Yes Historical Provider, MD   Vitamin D (CHOLECALCIFEROL) 1000 UNITS CAPS capsule Take 1,000 Units by mouth 2 times daily   Yes Historical Provider, MD        Allergies   Patient has no known allergies.     Social History     Social History     Tobacco History     Smoking Status  Former Smoker    Smokeless Tobacco Use  Never Used    Tobacco Comment  quit smoking cigars a very long time ago          Alcohol History     Alcohol Use Status  No          Drug Use     Drug Use Status  No          Sexual Activity     Sexually Active  Not Asked                Family History     Family History   Problem Relation Age of Onset    Heart Disease Mother     Heart Disease Father     Heart Disease Brother     Heart Attack Brother        Review of Systems   Pertinent ROS listed in HPI, all others negative    Physical Exam   BP (!) 117/57   Pulse 62   Temp 98.8 °F (37.1 °C)   Resp 18   Ht 5' 6\" (1.676 m)   Wt 156 lb (70.8 kg)   SpO2 96%   BMI 25.18 kg/m²     GENERAL:  No acute distress. Alert and fully oriented x3. HEAD:  Normocephalic, atraumatic. SKIN/EYES:  No scleral icterus but he does have jaundice of skin and frenulum. Conjugate gaze. ENT/NECK:  Trachea midline, mucous membranes dry. LUNGS:  No cough. Nonlabored breathing on room air. CARDIOVASC:  Normal rate, no cyanosis, regular rhythm, systolic murmur with no S1 nor S2 (aortic stenosis). ABDOMEN:  Soft, non-distended, completely non-tender. No guarding / rigidity / rebound. LIMBS:  No deformities, no edema. NEURO:  Face symmetric, moves all extremities  SKIN:  Warm, dry. Labs    CBC  Recent Labs     03/09/20  1829   WBC 7.1   HGB 13.3   HCT 42.2        BMP  Recent Labs     03/09/20 1829      K 4.1      CO2 21*   BUN 26*   CREATININE 1.1   CALCIUM 9.0     Liver Function  Recent Labs     03/09/20  1829 03/10/20  0345   LIPASE  --  7*  7*   BILITOT 2.5*  --    *  --    *  --    ALKPHOS 206*  --    PROT 7.2  --    LABALBU 3.8  --      No results for input(s): LACTATE in the last 72 hours. No results for input(s): INR, PTT in the last 72 hours. Invalid input(s): PT    Imaging/Diagnostics Last 24 Hours   Ct Abdomen Pelvis W Iv Contrast Additional Contrast? None    Result Date: 3/10/2020  PROCEDURE INFORMATION: Exam: CT Abdomen And Pelvis With Contrast Exam date and time: 3/9/2020 7:45 PM Age: 8 years old Clinical indication: Nausea and vomiting; Additional info: Nausea, vomiting, diarrhea, elevated liver enzymes TECHNIQUE: Imaging protocol: Computed tomography of the abdomen and pelvis with intravenous contrast. Radiation optimization: All CT scans at this facility use at least one of these dose optimization techniques: automated exposure control; mA and/or kV adjustment per patient size (includes targeted exams where dose is matched to clinical indication); or iterative reconstruction.  Contrast material: ISOVUE 370; Contrast volume: 110 ml; Contrast route: IV; COMPARISON: No relevant prior studies available. FINDINGS: Lungs: Mild bibasilar atelectasis. Mediastinum: Moderate size hiatal hernia. Liver: Normal. No mass. Gallbladder and bile ducts: Distended gallbladder. Gallstones are noted. Mild enhancement of the gallbladder. Dilatation of the extrahepatic bile ducts with possible stone or sludge within the distal common duct. Pancreas: Normal. No ductal dilation. Spleen: Normal. No splenomegaly. Adrenals: Normal. No mass. Kidneys and ureters: Normal. No hydronephrosis. Stomach and bowel: There is nonspecific mucosal thickening of the sigmoid colon. This most likely is from incomplete distention of the colon. Colonic diverticulosis. No evidence of diverticulitis. No dilated loops of bowel. Appendix: A normal appendix is identified. Intraperitoneal space: Unremarkable. No free air. No significant fluid collection. Vasculature: The vasculature demonstrates diffuse moderate atherosclerotic calcification. Lymph nodes: Unremarkable. No enlarged lymph nodes. Bladder: Unremarkable as visualized. Reproductive: Diffuse prostate hypertrophy noted. Bones/joints: Postoperative change of the left hip. Severe multilevel degenerative disc disease with vacuum disc phenomenon. Mild scoliosis. Soft tissues: Unremarkable. Gallstones are noted within a distended gallbladder. Possible stone within the distal common duct. If gallbladder disease is a concern clinically recommend correlation with gallbladder ultrasound. There is nonspecific mucosal thickening of the sigmoid colon. This most likely is from  incomplete distention of the colon. However cannot exclude mild colitis. No bowel obstruction or bowel perforation.  This report has been electronically signed by Kalia Gandara MD.      Assessment      Hospital Problems           Last Modified POA    Cholelithiasis with choledocholithiasis 3/10/2020 Yes          80 y.o. male with choledocholithiasis    Plan   - will consult Dr Lc Gomes for ERCP duct clearance  - rocephin/flagyl for cholangitis prophylaxis  - ok for clear liquid diet today, continue low IVF because of vomiting  - NPO at midnight  - trend hepatic panel  - we do not recommend cholecystectomy in the setting of advanced age and no other gallbladder stones     Plan was discussed with Dr. Wendy Callahan.     Electronically signed by Sera Rees MD on 3/10/20 at 11:40 AM EDT

## 2020-03-10 NOTE — ED PROVIDER NOTES
Minimal pulmonary opacities at the lung bases likely represent   atelectasis or scarring. No definite consolidation.          ------------------------- NURSING NOTES AND VITALS REVIEWED ---------------------------   The nursing notes within the ED encounter and vital signs as below have been reviewed. BP (!) 154/80   Pulse 87   Temp 98.8 °F (37.1 °C)   Resp 20   Ht 5' 6\" (1.676 m)   Wt 156 lb (70.8 kg)   SpO2 93%   BMI 25.18 kg/m²   Oxygen Saturation Interpretation: Normal      ---------------------------------------------------PHYSICAL EXAM--------------------------------------      Constitutional/General: Alert and oriented x3, well appearing, non toxic in NAD  Head: Normocephalic and atraumatic  Eyes: PERRL, EOMI  Mouth: Oropharynx clear, handling secretions, no trismus  Neck: Supple, full ROM,   Pulmonary: Lungs clear to auscultation bilaterally, no wheezes, rales, or rhonchi. Not in respiratory distress  Cardiovascular:  Regular rate and rhythm, no murmurs, gallops, or rubs. 2+ distal pulses  Abdomen: Soft,  tender, non distended, no rigidity. No areas of pulsation noted. No bulges. Extremities: Moves all extremities x 4. Warm and well perfused, no lower extremity edema noted. Generalized weakness. Skin: warm and dry without rash, skin jaundiced in appearance. Neurologic: GCS 15, alert, at baseline per family member. No acute confusion noted.   Psych: Normal Affect      ------------------------------ ED COURSE/MEDICAL DECISION MAKING----------------------  Medications   cefTRIAXone (ROCEPHIN) 1 g in dextrose 5 % 50 mL IVPB (vial-mate) (has no administration in time range)   metronidazole (FLAGYL) 500 mg in NaCl 100 mL IVPB premix (has no administration in time range)   0.9 % sodium chloride bolus (0 mLs Intravenous Stopped 3/10/20 0509)   metoclopramide (REGLAN) injection 10 mg (10 mg Intravenous Given 3/10/20 0401)   iopamidol (ISOVUE-370) 76 % injection 110 mL (110 mLs Intravenous Given 3/10/20 with choledocholithiasis        DISPOSITION  Disposition: Admit to telemetry  Patient condition is good      NOTE: This report was transcribed using voice recognition software. Every effort was made to ensure accuracy; however, inadvertent computerized transcription errors may be present     Annabellemanuel MoffettELLY CNP  03/10/20 0458  ATTENDING PROVIDER ATTESTATION:     I have personally performed and/or participated in the history, exam, medical decision making, and procedures and agree with all pertinent clinical information. I have also reviewed and agree with the past medical, family and social history unless otherwise noted. My findings/Plan: Patient was seen with nurse practitioner. Patient presented because of body aches and fevers and history of vomiting and diarrhea started yesterday. Patient was having chills at home. Patient reporting no abdominal pain. He has had decreased appetite. Patient reporting no chest pains or difficulty breathing. Patient is awake alert oriented he has what appears to be jaundice here in the emergency department and has icterus. Patient heart and lung exam normal abdomen is soft and nontender he has no rebound or guarding he is neurologically intact. .  Labs and EKG reviewed patient was noted to have elevated liver enzymes. And has notable jaundice. CT abdomen pelvis was ordered. EKG: This EKG is signed and interpreted by me. Rate: 95  Rhythm: Sinus  Interpretation: non-specific EKG  Comparison: no previous EKG available    Review CT findings and I did speak to on-call internal medicine.  and he wanted me to speak to surgeon I did speak to Dr. Bahman Cagle and patient can remain here at the main campus. And surgical resident to evaluate. And does report to me that there is someone here that can do ERCP if need be. I did speak to the primary and agreeable for admission. I did recheck patient as well. Patient is in no acute distress currently. Patient abdomen is soft and nontender.   Alec Nam MD  03/10/20 Rafael Martinez MD  03/10/20 0724

## 2020-03-11 ENCOUNTER — ANESTHESIA (OUTPATIENT)
Dept: ENDOSCOPY | Age: 85
DRG: 446 | End: 2020-03-11
Payer: MEDICARE

## 2020-03-11 ENCOUNTER — ANESTHESIA EVENT (OUTPATIENT)
Dept: ENDOSCOPY | Age: 85
DRG: 446 | End: 2020-03-11
Payer: MEDICARE

## 2020-03-11 ENCOUNTER — APPOINTMENT (OUTPATIENT)
Dept: GENERAL RADIOLOGY | Age: 85
DRG: 446 | End: 2020-03-11
Payer: MEDICARE

## 2020-03-11 VITALS
RESPIRATION RATE: 9 BRPM | DIASTOLIC BLOOD PRESSURE: 117 MMHG | SYSTOLIC BLOOD PRESSURE: 153 MMHG | OXYGEN SATURATION: 100 %

## 2020-03-11 LAB
ALBUMIN SERPL-MCNC: 3.2 G/DL (ref 3.5–5.2)
ALP BLD-CCNC: 230 U/L (ref 40–129)
ALT SERPL-CCNC: 107 U/L (ref 0–40)
ANION GAP SERPL CALCULATED.3IONS-SCNC: 15 MMOL/L (ref 7–16)
AST SERPL-CCNC: 109 U/L (ref 0–39)
BILIRUB SERPL-MCNC: 3.8 MG/DL (ref 0–1.2)
BUN BLDV-MCNC: 20 MG/DL (ref 8–23)
CALCIUM SERPL-MCNC: 8.6 MG/DL (ref 8.6–10.2)
CHLORIDE BLD-SCNC: 108 MMOL/L (ref 98–107)
CO2: 21 MMOL/L (ref 22–29)
CREAT SERPL-MCNC: 1 MG/DL (ref 0.7–1.2)
GFR AFRICAN AMERICAN: >60
GFR NON-AFRICAN AMERICAN: >60 ML/MIN/1.73
GLUCOSE BLD-MCNC: 87 MG/DL (ref 74–99)
POTASSIUM SERPL-SCNC: 4.5 MMOL/L (ref 3.5–5)
SODIUM BLD-SCNC: 144 MMOL/L (ref 132–146)
TOTAL PROTEIN: 6.1 G/DL (ref 6.4–8.3)

## 2020-03-11 PROCEDURE — 6360000002 HC RX W HCPCS: Performed by: NURSE ANESTHETIST, CERTIFIED REGISTERED

## 2020-03-11 PROCEDURE — 7100000000 HC PACU RECOVERY - FIRST 15 MIN: Performed by: SURGERY

## 2020-03-11 PROCEDURE — 3700000001 HC ADD 15 MINUTES (ANESTHESIA): Performed by: SURGERY

## 2020-03-11 PROCEDURE — 2500000003 HC RX 250 WO HCPCS: Performed by: SURGERY

## 2020-03-11 PROCEDURE — 6360000002 HC RX W HCPCS: Performed by: SURGERY

## 2020-03-11 PROCEDURE — 2500000003 HC RX 250 WO HCPCS: Performed by: NURSE ANESTHETIST, CERTIFIED REGISTERED

## 2020-03-11 PROCEDURE — 2580000003 HC RX 258: Performed by: SURGERY

## 2020-03-11 PROCEDURE — 2060000000 HC ICU INTERMEDIATE R&B

## 2020-03-11 PROCEDURE — 0F798DZ DILATION OF COMMON BILE DUCT WITH INTRALUMINAL DEVICE, VIA NATURAL OR ARTIFICIAL OPENING ENDOSCOPIC: ICD-10-PCS | Performed by: SURGERY

## 2020-03-11 PROCEDURE — 43274 ERCP DUCT STENT PLACEMENT: CPT | Performed by: SURGERY

## 2020-03-11 PROCEDURE — 3609014900 HC ERCP W/SPHINCTEROTOMY &/OR PAPILLOTOMY: Performed by: SURGERY

## 2020-03-11 PROCEDURE — 36415 COLL VENOUS BLD VENIPUNCTURE: CPT

## 2020-03-11 PROCEDURE — 0FC98ZZ EXTIRPATION OF MATTER FROM COMMON BILE DUCT, VIA NATURAL OR ARTIFICIAL OPENING ENDOSCOPIC: ICD-10-PCS | Performed by: SURGERY

## 2020-03-11 PROCEDURE — 3209999900 FLUORO FOR SURGICAL PROCEDURES

## 2020-03-11 PROCEDURE — C1769 GUIDE WIRE: HCPCS | Performed by: SURGERY

## 2020-03-11 PROCEDURE — 7100000001 HC PACU RECOVERY - ADDTL 15 MIN: Performed by: SURGERY

## 2020-03-11 PROCEDURE — 80053 COMPREHEN METABOLIC PANEL: CPT

## 2020-03-11 PROCEDURE — 2709999900 HC NON-CHARGEABLE SUPPLY: Performed by: SURGERY

## 2020-03-11 PROCEDURE — 2720000010 HC SURG SUPPLY STERILE: Performed by: SURGERY

## 2020-03-11 PROCEDURE — 6370000000 HC RX 637 (ALT 250 FOR IP): Performed by: INTERNAL MEDICINE

## 2020-03-11 PROCEDURE — 3700000000 HC ANESTHESIA ATTENDED CARE: Performed by: SURGERY

## 2020-03-11 PROCEDURE — 3609015200 HC ERCP REMOVE CALCULI/DEBRIS BILIARY/PANCREAS DUCT: Performed by: SURGERY

## 2020-03-11 PROCEDURE — 2500000003 HC RX 250 WO HCPCS: Performed by: INTERNAL MEDICINE

## 2020-03-11 PROCEDURE — 6360000004 HC RX CONTRAST MEDICATION: Performed by: SURGERY

## 2020-03-11 PROCEDURE — 43264 ERCP REMOVE DUCT CALCULI: CPT | Performed by: SURGERY

## 2020-03-11 RX ORDER — DEXAMETHASONE SODIUM PHOSPHATE 10 MG/ML
INJECTION INTRAMUSCULAR; INTRAVENOUS PRN
Status: DISCONTINUED | OUTPATIENT
Start: 2020-03-11 | End: 2020-03-11 | Stop reason: SDUPTHER

## 2020-03-11 RX ORDER — GLYCOPYRROLATE 1 MG/5 ML
SYRINGE (ML) INTRAVENOUS PRN
Status: DISCONTINUED | OUTPATIENT
Start: 2020-03-11 | End: 2020-03-11 | Stop reason: SDUPTHER

## 2020-03-11 RX ORDER — PHENYLEPHRINE HYDROCHLORIDE 10 MG/ML
INJECTION INTRAVENOUS PRN
Status: DISCONTINUED | OUTPATIENT
Start: 2020-03-11 | End: 2020-03-11 | Stop reason: SDUPTHER

## 2020-03-11 RX ORDER — MEPERIDINE HYDROCHLORIDE 50 MG/ML
12.5 INJECTION INTRAMUSCULAR; INTRAVENOUS; SUBCUTANEOUS EVERY 5 MIN PRN
Status: DISCONTINUED | OUTPATIENT
Start: 2020-03-11 | End: 2020-03-11

## 2020-03-11 RX ORDER — LABETALOL HYDROCHLORIDE 5 MG/ML
5 INJECTION, SOLUTION INTRAVENOUS EVERY 10 MIN PRN
Status: DISCONTINUED | OUTPATIENT
Start: 2020-03-11 | End: 2020-03-11

## 2020-03-11 RX ORDER — NEOSTIGMINE METHYLSULFATE 1 MG/ML
INJECTION, SOLUTION INTRAVENOUS PRN
Status: DISCONTINUED | OUTPATIENT
Start: 2020-03-11 | End: 2020-03-11 | Stop reason: SDUPTHER

## 2020-03-11 RX ORDER — VECURONIUM BROMIDE 1 MG/ML
INJECTION, POWDER, LYOPHILIZED, FOR SOLUTION INTRAVENOUS PRN
Status: DISCONTINUED | OUTPATIENT
Start: 2020-03-11 | End: 2020-03-11 | Stop reason: SDUPTHER

## 2020-03-11 RX ORDER — LIDOCAINE HYDROCHLORIDE 20 MG/ML
INJECTION, SOLUTION INTRAVENOUS PRN
Status: DISCONTINUED | OUTPATIENT
Start: 2020-03-11 | End: 2020-03-11 | Stop reason: SDUPTHER

## 2020-03-11 RX ORDER — ONDANSETRON 2 MG/ML
INJECTION INTRAMUSCULAR; INTRAVENOUS PRN
Status: DISCONTINUED | OUTPATIENT
Start: 2020-03-11 | End: 2020-03-11 | Stop reason: SDUPTHER

## 2020-03-11 RX ORDER — PROMETHAZINE HYDROCHLORIDE 25 MG/ML
25 INJECTION, SOLUTION INTRAMUSCULAR; INTRAVENOUS PRN
Status: DISCONTINUED | OUTPATIENT
Start: 2020-03-11 | End: 2020-03-11

## 2020-03-11 RX ORDER — ETOMIDATE 2 MG/ML
INJECTION INTRAVENOUS PRN
Status: DISCONTINUED | OUTPATIENT
Start: 2020-03-11 | End: 2020-03-11 | Stop reason: SDUPTHER

## 2020-03-11 RX ADMIN — PRAVASTATIN SODIUM 40 MG: 20 TABLET ORAL at 00:02

## 2020-03-11 RX ADMIN — METRONIDAZOLE 500 MG: 500 INJECTION, SOLUTION INTRAVENOUS at 06:42

## 2020-03-11 RX ADMIN — Medication 0.6 MG: at 15:09

## 2020-03-11 RX ADMIN — Medication 3 MG: at 15:09

## 2020-03-11 RX ADMIN — PANTOPRAZOLE SODIUM 40 MG: 40 TABLET, DELAYED RELEASE ORAL at 06:46

## 2020-03-11 RX ADMIN — PRAVASTATIN SODIUM 40 MG: 20 TABLET ORAL at 20:32

## 2020-03-11 RX ADMIN — VECURONIUM BROMIDE FOR INJECTION 4 MG: 1 INJECTION, POWDER, LYOPHILIZED, FOR SOLUTION INTRAVENOUS at 14:36

## 2020-03-11 RX ADMIN — POLYVINYL ALCOHOL 1 DROP: 14 SOLUTION/ DROPS OPHTHALMIC at 09:36

## 2020-03-11 RX ADMIN — ETOMIDATE 10 MG: 2 INJECTION, SOLUTION INTRAVENOUS at 14:36

## 2020-03-11 RX ADMIN — METRONIDAZOLE 500 MG: 500 INJECTION, SOLUTION INTRAVENOUS at 17:42

## 2020-03-11 RX ADMIN — PHENYLEPHRINE HYDROCHLORIDE 100 MCG: 10 INJECTION INTRAVENOUS at 15:03

## 2020-03-11 RX ADMIN — VITAMIN D, TAB 1000IU (100/BT) 1000 UNITS: 25 TAB at 00:02

## 2020-03-11 RX ADMIN — SODIUM CHLORIDE, POTASSIUM CHLORIDE, SODIUM LACTATE AND CALCIUM CHLORIDE: 600; 310; 30; 20 INJECTION, SOLUTION INTRAVENOUS at 15:09

## 2020-03-11 RX ADMIN — LIDOCAINE HYDROCHLORIDE 100 MG: 20 INJECTION, SOLUTION INTRAVENOUS at 14:36

## 2020-03-11 RX ADMIN — ONDANSETRON HYDROCHLORIDE 4 MG: 2 INJECTION, SOLUTION INTRAMUSCULAR; INTRAVENOUS at 14:55

## 2020-03-11 RX ADMIN — POLYVINYL ALCOHOL 1 DROP: 14 SOLUTION/ DROPS OPHTHALMIC at 00:01

## 2020-03-11 RX ADMIN — METRONIDAZOLE 500 MG: 500 INJECTION, SOLUTION INTRAVENOUS at 00:04

## 2020-03-11 RX ADMIN — VITAMIN D, TAB 1000IU (100/BT) 1000 UNITS: 25 TAB at 20:32

## 2020-03-11 RX ADMIN — DEXAMETHASONE SODIUM PHOSPHATE 10 MG: 10 INJECTION INTRAMUSCULAR; INTRAVENOUS at 14:55

## 2020-03-11 RX ADMIN — PANTOPRAZOLE SODIUM 40 MG: 40 TABLET, DELAYED RELEASE ORAL at 00:02

## 2020-03-11 RX ADMIN — POLYVINYL ALCOHOL 1 DROP: 14 SOLUTION/ DROPS OPHTHALMIC at 20:32

## 2020-03-11 RX ADMIN — CEFTRIAXONE SODIUM 2 G: 2 INJECTION, POWDER, FOR SOLUTION INTRAMUSCULAR; INTRAVENOUS at 06:42

## 2020-03-11 RX ADMIN — PHENYLEPHRINE HYDROCHLORIDE 100 MCG: 10 INJECTION INTRAVENOUS at 15:01

## 2020-03-11 ASSESSMENT — PULMONARY FUNCTION TESTS
PIF_VALUE: 15
PIF_VALUE: 3
PIF_VALUE: 1
PIF_VALUE: 15
PIF_VALUE: 18
PIF_VALUE: 17
PIF_VALUE: 15
PIF_VALUE: 17
PIF_VALUE: 15
PIF_VALUE: 19
PIF_VALUE: 2
PIF_VALUE: 18
PIF_VALUE: 15
PIF_VALUE: 16
PIF_VALUE: 14
PIF_VALUE: 16
PIF_VALUE: 14
PIF_VALUE: 39
PIF_VALUE: 13
PIF_VALUE: 15
PIF_VALUE: 6
PIF_VALUE: 5
PIF_VALUE: 5
PIF_VALUE: 15
PIF_VALUE: 15
PIF_VALUE: 2
PIF_VALUE: 14
PIF_VALUE: 0
PIF_VALUE: 1
PIF_VALUE: 15
PIF_VALUE: 15
PIF_VALUE: 16
PIF_VALUE: 11
PIF_VALUE: 41
PIF_VALUE: 16
PIF_VALUE: 3
PIF_VALUE: 18
PIF_VALUE: 18
PIF_VALUE: 14
PIF_VALUE: 9
PIF_VALUE: 16
PIF_VALUE: 5
PIF_VALUE: 0
PIF_VALUE: 20
PIF_VALUE: 15
PIF_VALUE: 3
PIF_VALUE: 17
PIF_VALUE: 15
PIF_VALUE: 14
PIF_VALUE: 14

## 2020-03-11 ASSESSMENT — PAIN SCALES - GENERAL
PAINLEVEL_OUTOF10: 0

## 2020-03-11 NOTE — PROGRESS NOTES
GENERAL SURGERY  DAILY PROGRESS NOTE  3/11/2020    Subjective:  No issues overnight. Denies abdominal pain. Denies nausea,,vomiting.     Objective:  BP (!) 141/71   Pulse 73   Temp 99 °F (37.2 °C) (Temporal)   Resp 20   Ht 5' 6\" (1.676 m)   Wt 148 lb 11.2 oz (67.4 kg)   SpO2 95%   BMI 24.00 kg/m²     GENERAL:  Laying in bed, awake, alert, cooperative, no apparent distress  HEAD: Normocephalic, atraumatic  EYES: No sclera icterus, pupils equal  LUNGS:  No increased work of breathing  CARDIOVASCULAR:  Regular rate  ABDOMEN:  Soft, non-tender, non-distended  EXTREMITIES: No edema or swelling  SKIN: Warm and dry    Assessment/Plan:  Arturo  [age] y.o. male with choledocholithiasis      ERCP today  Cont Rocephin/Flagyl for cholangitis prophylaxis  NPO for procedure  Trend hepatic panel  Do not recommend cholecystectomy in the setting of advanced age and no other gallstones      Electronically signed by Shailesh Calvillo DO on 3/11/2020 at 8:26 AM

## 2020-03-11 NOTE — PROGRESS NOTES
Subjective:    Chief complaint:    Issues with confusion  No abdominal pain  Family members by the bedside      Objective:    BP (!) 143/67   Pulse 71   Temp 98.4 °F (36.9 °C) (Temporal)   Resp 18   Ht 5' 6\" (1.676 m)   Wt 148 lb 11.2 oz (67.4 kg)   SpO2 95%   BMI 24.00 kg/m²   General : Awake ,alert,no distress. Heart:  RRR, no murmurs, gallops, or rubs. Lungs:  CTA bilaterally, no wheeze, rales or rhonchi  Abd: bowel sounds present, nontender, nondistended, no masses  Extrem:  No clubbing, cyanosis, or edema    CBC:   Lab Results   Component Value Date    WBC 8.7 03/10/2020    RBC 3.95 03/10/2020    HGB 11.8 03/10/2020    HCT 37.1 03/10/2020    MCV 93.9 03/10/2020    MCH 29.9 03/10/2020    MCHC 31.8 03/10/2020    RDW 13.5 03/10/2020     03/10/2020    MPV 9.7 03/10/2020     BMP:    Lab Results   Component Value Date     03/11/2020    K 4.5 03/11/2020    K 4.3 03/10/2020     03/11/2020    CO2 21 03/11/2020    BUN 20 03/11/2020    LABALBU 3.2 03/11/2020    LABALBU 4.3 08/03/2011    CREATININE 1.0 03/11/2020    CALCIUM 8.6 03/11/2020    GFRAA >60 03/11/2020    LABGLOM >60 03/11/2020    GLUCOSE 87 03/11/2020    GLUCOSE 90 08/03/2011     PT/INR:    Lab Results   Component Value Date    PROTIME 13.1 03/10/2020    INR 1.2 03/10/2020     Troponin:    Lab Results   Component Value Date    TROPONINI <0.01 03/10/2020       No results for input(s): LABURIN in the last 72 hours. Recent Labs     03/09/20  1829   BC 24 Hours- no growth     No results for input(s): Alexander Saint in the last 72 hours.       Current Facility-Administered Medications:     promethazine (PHENERGAN) injection 25 mg, 25 mg, Intravenous, PRN, Megan Hwang MD    labetalol (NORMODYNE;TRANDATE) injection 5 mg, 5 mg, Intravenous, Q10 Min PRN, Megan Hwang MD    meperidine (DEMEROL) injection 12.5 mg, 12.5 mg, Intravenous, Q5 Min PRN, Megan Hwang MD    HYDROmorphone (DILAUDID) injection 0.25 mg, 0.25 mg, Intravenous, Q5 Min PRN, Joaquin Mcmillan MD    HYDROmorphone (DILAUDID) injection 0.5 mg, 0.5 mg, Intravenous, Q5 Min PRN, Joaquin Mcmillan MD    lactated ringers infusion, , Intravenous, Continuous, Maddie Miller MD, Last Rate: 75 mL/hr at 03/10/20 1324    metronidazole (FLAGYL) 500 mg in NaCl 100 mL IVPB premix, 500 mg, Intravenous, Q8H, Maddie Miller MD, Stopped at 03/11/20 0674    Vitamin D (CHOLECALCIFEROL) tablet 1,000 Units, 1,000 Units, Oral, BID, Brian Gibson MD, Stopped at 03/11/20 6078    polyvinyl alcohol (LIQUIFILM TEARS) 1.4 % ophthalmic solution 1 drop, 1 drop, Both Eyes, TID, Brian Gibson MD, 1 drop at 03/11/20 0936    antioxidant multivitamin (OCUVITE) tablet, 2 tablet, Oral, Daily, Brian Gibson MD, Stopped at 03/11/20 5815    pravastatin (PRAVACHOL) tablet 40 mg, 40 mg, Oral, Nightly, Brian Gibson MD, 40 mg at 03/11/20 0002    bevacizumab 2.5 mg/0.1 ml syringe, 2.5 mg, Intraocular, Once, Brian Gibson MD    vitamin E capsule 1,000 Units, 1,000 Units, Oral, Daily, Brian Gibson MD, Stopped at 03/11/20 0928    ondansetron (ZOFRAN) injection 4 mg, 4 mg, Intravenous, Q6H PRN, Brian Gibson MD    cefTRIAXone (ROCEPHIN) 2 g in sterile water 20 mL IV syringe, 2 g, Intravenous, Q24H, Maddie Miller MD, 2 g at 03/11/20 3047    pantoprazole (PROTONIX) tablet 40 mg, 40 mg, Oral, QAM AC, Brian Gibson MD, 40 mg at 03/11/20 4045    Facility-Administered Medications Ordered in Other Encounters:     etomidate (AMIDATE) injection, , , PRN, ELLY Milton - CRNA, 10 mg at 03/11/20 1436    lidocaine (cardiac) (XYLOCAINE) injection, , , PRN, ELLY Milton CRNA, 100 mg at 03/11/20 1436    vecuronium (NORCURON) injection, , , PRN, ELLY Milton CRNA, 4 mg at 03/11/20 1436    dexamethasone (DECADRON) injection, , , PRN, ELLY Milton - CRNA, 10 mg at 03/11/20 1455    ondansetron (ZOFRAN) injection, , , PRN, Elsi Ashley

## 2020-03-11 NOTE — PROGRESS NOTES
Patient arrived to PACU with anesthesia , upper dentures placed back in patients mouth , patient telemetry monitor bedside .  VSS

## 2020-03-11 NOTE — ANESTHESIA PRE PROCEDURE
ADENOIDECTOMY      UPPER GASTROINTESTINAL ENDOSCOPY         Social History:    Social History     Tobacco Use    Smoking status: Former Smoker    Smokeless tobacco: Never Used    Tobacco comment: quit smoking cigars a very long time ago   Substance Use Topics    Alcohol use: No                                Counseling given: Not Answered  Comment: quit smoking cigars a very long time ago      Vital Signs (Current):   Vitals:    03/10/20 1845 03/10/20 1900 03/10/20 2302 03/11/20 0930   BP:   (!) 141/71 135/63   Pulse: 58 61 73 70   Resp: 16 13 20 17   Temp:   99 °F (37.2 °C) 98.2 °F (36.8 °C)   TempSrc:   Temporal Temporal   SpO2: 96% 97% 95% 95%   Weight:   148 lb 11.2 oz (67.4 kg)    Height:                                                  BP Readings from Last 3 Encounters:   03/11/20 135/63   11/05/18 (!) 164/90   05/17/18 122/70       NPO Status:                                                                                 BMI:   Wt Readings from Last 3 Encounters:   03/10/20 148 lb 11.2 oz (67.4 kg)   11/05/18 156 lb (70.8 kg)   05/17/18 159 lb (72.1 kg)     Body mass index is 24 kg/m². CBC:   Lab Results   Component Value Date    WBC 8.7 03/10/2020    RBC 3.95 03/10/2020    HGB 11.8 03/10/2020    HCT 37.1 03/10/2020    MCV 93.9 03/10/2020    RDW 13.5 03/10/2020     03/10/2020       CMP:   Lab Results   Component Value Date     03/11/2020    K 4.5 03/11/2020    K 4.3 03/10/2020     03/11/2020    CO2 21 03/11/2020    BUN 20 03/11/2020    CREATININE 1.0 03/11/2020    GFRAA >60 03/11/2020    LABGLOM >60 03/11/2020    GLUCOSE 87 03/11/2020    GLUCOSE 90 08/03/2011    PROT 6.1 03/11/2020    CALCIUM 8.6 03/11/2020    BILITOT 3.8 03/11/2020    ALKPHOS 230 03/11/2020     03/11/2020     03/11/2020       POC Tests: No results for input(s): POCGLU, POCNA, POCK, POCCL, POCBUN, POCHEMO, POCHCT in the last 72 hours.     Coags:   Lab Results   Component Value Date    PROTIME 13.1 03/10/2020    INR 1.2 03/10/2020    APTT 25.8 11/22/2015       HCG (If Applicable): No results found for: PREGTESTUR, PREGSERUM, HCG, HCGQUANT     ABGs: No results found for: PHART, PO2ART, JGA4ZOU, FDB4PQX, BEART, E9ABTILD     Type & Screen (If Applicable):  No results found for: [facility]    Anesthesia Evaluation  Patient summary reviewed no history of anesthetic complications:   Airway: Mallampati: II  TM distance: >3 FB   Neck ROM: full  Mouth opening: > = 3 FB Dental: normal exam         Pulmonary:Negative Pulmonary ROS breath sounds clear to auscultation                             Cardiovascular:    (+) valvular problems/murmurs (Moderate AS): AS,         Rhythm: regular  Rate: normal                    Neuro/Psych:   (+) CVA:,             GI/Hepatic/Renal:   (+) GERD:,          ROS comment: Cholelithiasis with choledocholithiasis  Cm's esophagus with esophagitis. Endo/Other:    (+) : arthritis: OA., .                 Abdominal:           Vascular: negative vascular ROS. Anesthesia Plan      general     ASA 3       Induction: intravenous. MIPS: Postoperative opioids intended and Prophylactic antiemetics administered. Anesthetic plan and risks discussed with patient. Plan discussed with CRNA.                   Mercedez Oliveira MD   3/11/2020

## 2020-03-11 NOTE — PROGRESS NOTES
Endoscopic SURGERY  DAILY PROGRESS NOTE  3/11/2020    Subjective:  No issues overnight. Denies abdominal pain. Denies nausea,,vomiting.     Objective:  BP (!) 141/71   Pulse 73   Temp 99 °F (37.2 °C) (Temporal)   Resp 20   Ht 5' 6\" (1.676 m)   Wt 148 lb 11.2 oz (67.4 kg)   SpO2 95%   BMI 24.00 kg/m²     GENERAL:  Laying in bed, awake, alert, cooperative, no apparent distress  HEAD: Normocephalic, atraumatic  EYES: No sclera icterus, pupils equal  LUNGS:  No increased work of breathing  CARDIOVASCULAR:  Regular rate  ABDOMEN:  Soft, non-tender, non-distended  EXTREMITIES: No edema or swelling  SKIN: Warm and dry    Assessment/Plan:  Arturo Qasim [age] y.o. male with choledocholithiasis      ERCP today      Electronically signed by Aryan Shirley DO on 3/11/2020 at 8:25 AM

## 2020-03-11 NOTE — CARE COORDINATION
Met with pt, spouse, and daughter. Plan remains home with no needs. Daughter to transport home. Will follow for any discharge needs. Desiree OLIVEIRA

## 2020-03-12 LAB
ALBUMIN SERPL-MCNC: 2.8 G/DL (ref 3.5–5.2)
ALP BLD-CCNC: 228 U/L (ref 40–129)
ALT SERPL-CCNC: 75 U/L (ref 0–40)
ANION GAP SERPL CALCULATED.3IONS-SCNC: 14 MMOL/L (ref 7–16)
AST SERPL-CCNC: 64 U/L (ref 0–39)
BILIRUB SERPL-MCNC: 1.2 MG/DL (ref 0–1.2)
BILIRUBIN DIRECT: 0.7 MG/DL (ref 0–0.3)
BILIRUBIN, INDIRECT: 0.5 MG/DL (ref 0–1)
BUN BLDV-MCNC: 20 MG/DL (ref 8–23)
CALCIUM SERPL-MCNC: 8.3 MG/DL (ref 8.6–10.2)
CHLORIDE BLD-SCNC: 108 MMOL/L (ref 98–107)
CO2: 21 MMOL/L (ref 22–29)
CREAT SERPL-MCNC: 1.1 MG/DL (ref 0.7–1.2)
FECAL NEUTRAL FAT: NORMAL
FECAL SPLIT FATS: NORMAL
GFR AFRICAN AMERICAN: >60
GFR NON-AFRICAN AMERICAN: >60 ML/MIN/1.73
GLUCOSE BLD-MCNC: 128 MG/DL (ref 74–99)
LIPASE: 5 U/L (ref 13–60)
POTASSIUM SERPL-SCNC: 4.3 MMOL/L (ref 3.5–5)
SODIUM BLD-SCNC: 143 MMOL/L (ref 132–146)
TOTAL PROTEIN: 5.7 G/DL (ref 6.4–8.3)

## 2020-03-12 PROCEDURE — 6370000000 HC RX 637 (ALT 250 FOR IP): Performed by: INTERNAL MEDICINE

## 2020-03-12 PROCEDURE — 83690 ASSAY OF LIPASE: CPT

## 2020-03-12 PROCEDURE — 99222 1ST HOSP IP/OBS MODERATE 55: CPT | Performed by: SURGERY

## 2020-03-12 PROCEDURE — 2060000000 HC ICU INTERMEDIATE R&B

## 2020-03-12 PROCEDURE — 6370000000 HC RX 637 (ALT 250 FOR IP): Performed by: SURGERY

## 2020-03-12 PROCEDURE — 80048 BASIC METABOLIC PNL TOTAL CA: CPT

## 2020-03-12 PROCEDURE — 36415 COLL VENOUS BLD VENIPUNCTURE: CPT

## 2020-03-12 PROCEDURE — 6360000002 HC RX W HCPCS: Performed by: INTERNAL MEDICINE

## 2020-03-12 PROCEDURE — 80076 HEPATIC FUNCTION PANEL: CPT

## 2020-03-12 PROCEDURE — 2580000003 HC RX 258: Performed by: INTERNAL MEDICINE

## 2020-03-12 PROCEDURE — 2500000003 HC RX 250 WO HCPCS: Performed by: INTERNAL MEDICINE

## 2020-03-12 RX ORDER — METRONIDAZOLE 500 MG/1
500 TABLET ORAL EVERY 8 HOURS SCHEDULED
Status: DISCONTINUED | OUTPATIENT
Start: 2020-03-12 | End: 2020-03-14 | Stop reason: HOSPADM

## 2020-03-12 RX ORDER — METRONIDAZOLE 500 MG/1
500 TABLET ORAL EVERY 8 HOURS SCHEDULED
Status: DISCONTINUED | OUTPATIENT
Start: 2020-03-12 | End: 2020-03-12 | Stop reason: SDUPTHER

## 2020-03-12 RX ORDER — CEFDINIR 300 MG/1
600 CAPSULE ORAL DAILY
Qty: 28 CAPSULE | Refills: 0 | Status: SHIPPED | OUTPATIENT
Start: 2020-03-12 | End: 2020-03-26

## 2020-03-12 RX ORDER — CEFDINIR 300 MG/1
300 CAPSULE ORAL EVERY 12 HOURS SCHEDULED
Status: DISCONTINUED | OUTPATIENT
Start: 2020-03-12 | End: 2020-03-14 | Stop reason: HOSPADM

## 2020-03-12 RX ORDER — METRONIDAZOLE 500 MG/1
500 TABLET ORAL 3 TIMES DAILY
Qty: 42 TABLET | Refills: 0 | Status: SHIPPED | OUTPATIENT
Start: 2020-03-12 | End: 2020-03-26

## 2020-03-12 RX ADMIN — VITAMIN D, TAB 1000IU (100/BT) 1000 UNITS: 25 TAB at 08:46

## 2020-03-12 RX ADMIN — METRONIDAZOLE 500 MG: 500 INJECTION, SOLUTION INTRAVENOUS at 00:38

## 2020-03-12 RX ADMIN — METRONIDAZOLE 500 MG: 500 TABLET, FILM COATED ORAL at 13:52

## 2020-03-12 RX ADMIN — POLYVINYL ALCOHOL 1 DROP: 14 SOLUTION/ DROPS OPHTHALMIC at 08:47

## 2020-03-12 RX ADMIN — CYCLOPENTOLATE HYDROCHLORIDE 2 TABLET: 10 SOLUTION/ DROPS OPHTHALMIC at 08:46

## 2020-03-12 RX ADMIN — CEFDINIR 300 MG: 300 CAPSULE ORAL at 09:45

## 2020-03-12 RX ADMIN — METRONIDAZOLE 500 MG: 500 INJECTION, SOLUTION INTRAVENOUS at 06:47

## 2020-03-12 RX ADMIN — CEFDINIR 300 MG: 300 CAPSULE ORAL at 21:26

## 2020-03-12 RX ADMIN — POLYVINYL ALCOHOL 1 DROP: 14 SOLUTION/ DROPS OPHTHALMIC at 13:53

## 2020-03-12 RX ADMIN — VITAMIN D, TAB 1000IU (100/BT) 1000 UNITS: 25 TAB at 21:27

## 2020-03-12 RX ADMIN — PANTOPRAZOLE SODIUM 40 MG: 40 TABLET, DELAYED RELEASE ORAL at 06:48

## 2020-03-12 RX ADMIN — METRONIDAZOLE 500 MG: 500 TABLET, FILM COATED ORAL at 21:27

## 2020-03-12 RX ADMIN — Medication 1000 UNITS: at 08:47

## 2020-03-12 RX ADMIN — PRAVASTATIN SODIUM 40 MG: 20 TABLET ORAL at 21:27

## 2020-03-12 RX ADMIN — POLYVINYL ALCOHOL 1 DROP: 14 SOLUTION/ DROPS OPHTHALMIC at 21:26

## 2020-03-12 RX ADMIN — CEFTRIAXONE SODIUM 2 G: 2 INJECTION, POWDER, FOR SOLUTION INTRAMUSCULAR; INTRAVENOUS at 06:47

## 2020-03-12 ASSESSMENT — PAIN SCALES - GENERAL
PAINLEVEL_OUTOF10: 0

## 2020-03-12 NOTE — CARE COORDINATION
Reviewed chart, anticipate discharge 3/13, no needs per family. Daughter to transport home. Rosa M Reusing LSW

## 2020-03-12 NOTE — ANESTHESIA POSTPROCEDURE EVALUATION
Department of Anesthesiology  Postprocedure Note    Patient: Cade Tuttle  MRN: [id number]  YOB: 1919  Date of evaluation: 3/11/2020  Time:  10:19 PM     Procedure Summary     Date:  03/11/20 Room / Location:  [address] / [facility]    Anesthesia Start:  2188 Anesthesia Stop:  5016    Procedures:       ERCP STONE REMOVAL (N/A )      ERCP SPHINCTER/PAPILLOTOMY Diagnosis:  (ERCP)    Surgeon:  Ree Patel MD Responsible Provider:  Hang Plata MD    Anesthesia Type:  general ASA Status:  3          Anesthesia Type: general    Evelyn Phase I: Evelyn Score: 9    Evelyn Phase II:      Last vitals: Reviewed and per EMR flowsheets.        Anesthesia Post Evaluation    Patient location during evaluation: PACU  Patient participation: complete - patient participated  Level of consciousness: awake  Airway patency: patent  Nausea & Vomiting: no nausea and no vomiting  Complications: no  Cardiovascular status: hemodynamically stable  Respiratory status: acceptable  Hydration status: stable

## 2020-03-12 NOTE — PROGRESS NOTES
Endoscopic SURGERY  DAILY PROGRESS NOTE  3/12/2020    Subjective:  No issues overnight. S/p ERCP. Denies abdominal pain. Denies nausea,vomiting. Had loose bowel movements which are normal for him.     Objective:  BP (!) 109/58   Pulse 77   Temp 98.2 °F (36.8 °C) (Temporal)   Resp 16   Ht 5' 6\" (1.676 m)   Wt 148 lb 11.2 oz (67.4 kg)   SpO2 92%   BMI 24.00 kg/m²     GENERAL:  Laying in bed, awake, alert, cooperative, no apparent distress  HEAD: Normocephalic, atraumatic  EYES: No sclera icterus, pupils equal  LUNGS:  No increased work of breathing  CARDIOVASCULAR:  Regular rate  ABDOMEN:  Soft, non-tender, non-distended  EXTREMITIES: No edema or swelling  SKIN: Warm and dry    Assessment/Plan:  80 y.o. male with choledocholithiasis    S/p ERCP with stent placement  LFTs trending down  On rocephin, flagyl - change to oral omnicef, flagyl x 14d  Follow up with Dr. Uvaldo Lopez in 2 weeks  [facility] for discharge from surgical endoscopy pov      Electronically signed by Elsa Miranda DO on 3/12/2020 at 7:45 AM

## 2020-03-12 NOTE — PROGRESS NOTES
Subjective:    Chief complaint:    Feeling ok  Denies new issues    Objective:    BP (!) 136/56 Comment: manuall  Pulse 85   Temp 97.4 °F (36.3 °C) (Temporal)   Resp 18   Ht 5' 6\" (1.676 m)   Wt 148 lb 11.2 oz (67.4 kg)   SpO2 95%   BMI 24.00 kg/m²   General : Awake ,alert,no distress. Heart:  RRR, no murmurs, gallops, or rubs. Lungs:  CTA bilaterally, no wheeze, rales or rhonchi  Abd: bowel sounds present, nontender, nondistended, no masses  Extrem:  No clubbing, cyanosis, or edema    CBC:   Lab Results   Component Value Date    WBC 8.7 03/10/2020    RBC 3.95 03/10/2020    HGB 11.8 03/10/2020    HCT 37.1 03/10/2020    MCV 93.9 03/10/2020    MCH 29.9 03/10/2020    MCHC 31.8 03/10/2020    RDW 13.5 03/10/2020     03/10/2020    MPV 9.7 03/10/2020     BMP:    Lab Results   Component Value Date     03/12/2020    K 4.3 03/12/2020    K 4.3 03/10/2020     03/12/2020    CO2 21 03/12/2020    BUN 20 03/12/2020    LABALBU 2.8 03/12/2020    LABALBU 4.3 08/03/2011    CREATININE 1.1 03/12/2020    CALCIUM 8.3 03/12/2020    GFRAA >60 03/12/2020    LABGLOM >60 03/12/2020    GLUCOSE 128 03/12/2020    GLUCOSE 90 08/03/2011     PT/INR:    Lab Results   Component Value Date    PROTIME 13.1 03/10/2020    INR 1.2 03/10/2020     Troponin:    Lab Results   Component Value Date    TROPONINI <0.01 03/10/2020       No results for input(s): LABURIN in the last 72 hours. Recent Labs     03/09/20  1829   BC 24 Hours- no growth     No results for input(s): Gaynel Quarry in the last 72 hours.       Current Facility-Administered Medications:     cefdinir (OMNICEF) capsule 300 mg, 300 mg, Oral, 2 times per day, Azul E Kaercher, DO, 300 mg at 03/12/20 0945    metroNIDAZOLE (FLAGYL) tablet 500 mg, 500 mg, Oral, 3 times per day, Azul Cisneros DO    lactated ringers infusion, , Intravenous, Continuous, Wade Cherry MD, Last Rate: 75 mL/hr at 03/10/20 1324    Vitamin D (CHOLECALCIFEROL) tablet 1,000 Units,

## 2020-03-13 ENCOUNTER — APPOINTMENT (OUTPATIENT)
Dept: GENERAL RADIOLOGY | Age: 85
DRG: 446 | End: 2020-03-13
Payer: MEDICARE

## 2020-03-13 PROBLEM — K80.70 CHOLELITHIASIS WITH CHOLEDOCHOLITHIASIS: Status: RESOLVED | Noted: 2020-03-10 | Resolved: 2020-03-13

## 2020-03-13 LAB
ALBUMIN SERPL-MCNC: 3.1 G/DL (ref 3.5–5.2)
ALP BLD-CCNC: 196 U/L (ref 40–129)
ALT SERPL-CCNC: 57 U/L (ref 0–40)
ANION GAP SERPL CALCULATED.3IONS-SCNC: 11 MMOL/L (ref 7–16)
AST SERPL-CCNC: 61 U/L (ref 0–39)
BASOPHILS ABSOLUTE: 0.03 E9/L (ref 0–0.2)
BASOPHILS RELATIVE PERCENT: 0.4 % (ref 0–2)
BILIRUB SERPL-MCNC: 0.8 MG/DL (ref 0–1.2)
BILIRUBIN DIRECT: 0.5 MG/DL (ref 0–0.3)
BILIRUBIN, INDIRECT: 0.3 MG/DL (ref 0–1)
BUN BLDV-MCNC: 25 MG/DL (ref 8–23)
CALCIUM SERPL-MCNC: 8.7 MG/DL (ref 8.6–10.2)
CHLORIDE BLD-SCNC: 108 MMOL/L (ref 98–107)
CO2: 21 MMOL/L (ref 22–29)
CREAT SERPL-MCNC: 1.1 MG/DL (ref 0.7–1.2)
EOSINOPHILS ABSOLUTE: 0.03 E9/L (ref 0.05–0.5)
EOSINOPHILS RELATIVE PERCENT: 0.4 % (ref 0–6)
GFR AFRICAN AMERICAN: >60
GFR NON-AFRICAN AMERICAN: >60 ML/MIN/1.73
GLUCOSE BLD-MCNC: 88 MG/DL (ref 74–99)
HCT VFR BLD CALC: 40 % (ref 37–54)
HEMOGLOBIN: 12.6 G/DL (ref 12.5–16.5)
IMMATURE GRANULOCYTES #: 0.06 E9/L
IMMATURE GRANULOCYTES %: 0.7 % (ref 0–5)
LYMPHOCYTES ABSOLUTE: 1.43 E9/L (ref 1.5–4)
LYMPHOCYTES RELATIVE PERCENT: 17.9 % (ref 20–42)
MCH RBC QN AUTO: 29.9 PG (ref 26–35)
MCHC RBC AUTO-ENTMCNC: 31.5 % (ref 32–34.5)
MCV RBC AUTO: 95 FL (ref 80–99.9)
MONOCYTES ABSOLUTE: 0.75 E9/L (ref 0.1–0.95)
MONOCYTES RELATIVE PERCENT: 9.4 % (ref 2–12)
NEUTROPHILS ABSOLUTE: 5.71 E9/L (ref 1.8–7.3)
NEUTROPHILS RELATIVE PERCENT: 71.2 % (ref 43–80)
PDW BLD-RTO: 14.1 FL (ref 11.5–15)
PLATELET # BLD: 226 E9/L (ref 130–450)
PMV BLD AUTO: 9.9 FL (ref 7–12)
POTASSIUM SERPL-SCNC: 3.8 MMOL/L (ref 3.5–5)
RBC # BLD: 4.21 E12/L (ref 3.8–5.8)
SODIUM BLD-SCNC: 140 MMOL/L (ref 132–146)
TOTAL PROTEIN: 5.6 G/DL (ref 6.4–8.3)
WBC # BLD: 8 E9/L (ref 4.5–11.5)

## 2020-03-13 PROCEDURE — 6370000000 HC RX 637 (ALT 250 FOR IP): Performed by: INTERNAL MEDICINE

## 2020-03-13 PROCEDURE — 85025 COMPLETE CBC W/AUTO DIFF WBC: CPT

## 2020-03-13 PROCEDURE — 6370000000 HC RX 637 (ALT 250 FOR IP): Performed by: SURGERY

## 2020-03-13 PROCEDURE — 9900000074 HC THERAPEUTIC ACTIVITIES PER 15 MIN (SELF-PAY): Performed by: PHYSICAL THERAPIST

## 2020-03-13 PROCEDURE — 80076 HEPATIC FUNCTION PANEL: CPT

## 2020-03-13 PROCEDURE — 36415 COLL VENOUS BLD VENIPUNCTURE: CPT

## 2020-03-13 PROCEDURE — 71046 X-RAY EXAM CHEST 2 VIEWS: CPT

## 2020-03-13 PROCEDURE — 80048 BASIC METABOLIC PNL TOTAL CA: CPT

## 2020-03-13 PROCEDURE — 87040 BLOOD CULTURE FOR BACTERIA: CPT

## 2020-03-13 PROCEDURE — 97161 PT EVAL LOW COMPLEX 20 MIN: CPT | Performed by: PHYSICAL THERAPIST

## 2020-03-13 PROCEDURE — 2060000000 HC ICU INTERMEDIATE R&B

## 2020-03-13 RX ADMIN — POLYVINYL ALCOHOL 1 DROP: 14 SOLUTION/ DROPS OPHTHALMIC at 11:09

## 2020-03-13 RX ADMIN — PANTOPRAZOLE SODIUM 40 MG: 40 TABLET, DELAYED RELEASE ORAL at 06:25

## 2020-03-13 RX ADMIN — VITAMIN D, TAB 1000IU (100/BT) 1000 UNITS: 25 TAB at 10:56

## 2020-03-13 RX ADMIN — CEFDINIR 300 MG: 300 CAPSULE ORAL at 20:43

## 2020-03-13 RX ADMIN — METRONIDAZOLE 500 MG: 500 TABLET, FILM COATED ORAL at 13:45

## 2020-03-13 RX ADMIN — PRAVASTATIN SODIUM 40 MG: 20 TABLET ORAL at 20:41

## 2020-03-13 RX ADMIN — VITAMIN D, TAB 1000IU (100/BT) 1000 UNITS: 25 TAB at 20:41

## 2020-03-13 RX ADMIN — CEFDINIR 300 MG: 300 CAPSULE ORAL at 10:59

## 2020-03-13 RX ADMIN — METRONIDAZOLE 500 MG: 500 TABLET, FILM COATED ORAL at 06:26

## 2020-03-13 RX ADMIN — POLYVINYL ALCOHOL 1 DROP: 14 SOLUTION/ DROPS OPHTHALMIC at 13:46

## 2020-03-13 RX ADMIN — METRONIDAZOLE 500 MG: 500 TABLET, FILM COATED ORAL at 20:41

## 2020-03-13 RX ADMIN — CYCLOPENTOLATE HYDROCHLORIDE 2 TABLET: 10 SOLUTION/ DROPS OPHTHALMIC at 10:59

## 2020-03-13 RX ADMIN — Medication 1000 UNITS: at 10:56

## 2020-03-13 RX ADMIN — POLYVINYL ALCOHOL 1 DROP: 14 SOLUTION/ DROPS OPHTHALMIC at 20:41

## 2020-03-13 ASSESSMENT — PAIN SCALES - GENERAL
PAINLEVEL_OUTOF10: 0

## 2020-03-13 NOTE — PROGRESS NOTES
Dr. Lagunas Many notified of patients fever prior to discharge per family members request. Karen Carvajal orders placed by telephone with readback.

## 2020-03-13 NOTE — PROGRESS NOTES
Physical Therapy    Physical Therapy Initial Assessment     Name: Leyla Thompson  : 1919  MRN: [id number]    Referring Provider:  Lazaro Hudson MD    Date of Service: 3/13/2020    Evaluating PT:  Dyllan Cox, PT, DPT [id number]      Room #:  4583/6527-Q  Diagnosis:  Cholelithiasis with choledocholithiasis  PMHx/PSHx:  Anemia, aortic stenosis, melanoma, dizziness, CVA  Procedure/Surgery:  ERCP with sphincterotomy and stone extraction (3/11)  Precautions:  Fall risk  Equipment Needs:  Pt owns necessary DME    SUBJECTIVE:    Pt lives with wife in a single story house with 3 stairs to enter and B rails. Bed is on first floor and bath is on first floor. Pt ambulated with rollator prior to admission. Pt also owns FWW and SPC. OBJECTIVE:   Initial Evaluation  Date: 3/13/20 Treatment Short Term/ Long Term   Goals   AM-PAC 6 Clicks      Was pt agreeable to Eval/treatment? yes     Does pt have pain? No c/o pain throughout session     Bed Mobility  Rolling: Supervision  Supine to sit: Supervision  Sit to supine: Supervision  Scooting: Supervision  Rolling: Mod Independent   Supine to sit: Mod Independent   Sit to supine: Mod Independent   Scooting: Mod Independent    Transfers Sit to stand: Supervision  Stand to sit: Supervision  Stand pivot: Supervision  Sit to stand: Mod Independent   Stand to sit: Mod Independent   Stand pivot: Mod Independent    Ambulation    300 feet with rollator with Supervision  >300 feet with rollator with Mod Independent    Stair negotiation: ascended and descended  10 steps with 1 rail SBA  10 steps with 1 rail Mod Independent    ROM BUE:  WNL  BLE:  WNL     Strength BUE:  WNL  BLE:  WNL     Balance Sitting EOB:  Supervision  Dynamic Standing:  Supervision with rollator  Sitting EOB:  Mod Independent   Dynamic Standing:   Mod Independent with rollator     Pt is A & O x 4  Sensation:  Pt denies numbness and tingling to extremities  Edema:  unremarkable    Patient education  Pt and prior level of function, completion of standardized testing/informal observation of tasks, assessment of data and education on plan of care and goals.     CPT codes:  [x] Low Complexity PT evaluation 35990  [] Moderate Complexity PT evaluation 17044  [] High Complexity PT evaluation 51038  [] PT Re-evaluation 82361  [] Gait training 89985 0 minutes  [] Manual therapy 98356 0 minutes  [x] Therapeutic activities 48006 8 minutes  [] Therapeutic exercises 13817 0 minutes  [] Neuromuscular reeducation 89887 0 minutes     Dearl Ashish, PT, DPT  [id number]

## 2020-03-13 NOTE — CARE COORDINATION
Discharge instructions explained to patient by PA. Patient verbalizes understanding. Patient and discharge paperwork escorted to registration desk by RN at this time. Discharge paperwork given to registration for completion of discharge.      Pt discharged home. Spoke with daughter who would like [facility], jen Kaur. Referral made to [facility]. [facility] orders written. Zena OLIVEIRA. The Plan for Transition of Care is related to the following treatment goals: The Patient and/or patient representative was provided with a choice of provider and agrees   with the discharge plan. [x] Yes [] No    Freedom of choice list was provided with basic dialogue that supports the patient's individualized plan of care/goals, treatment preferences and shares the quality data associated with the providers.  [x] Yes [] No

## 2020-03-13 NOTE — PROGRESS NOTES
Subjective:    Chief complaint:    Eating better  Feeling better  Confusion has improved  Daughter is by the bed side    Objective:    BP (!) 140/73   Pulse 83   Temp 97.8 °F (36.6 °C) (Temporal)   Resp 16   Ht 5' 6\" (1.676 m)   Wt 148 lb 11.2 oz (67.4 kg)   SpO2 93%   BMI 24.00 kg/m²   General : Awake ,alert,no distress. Heart:  RRR, no murmurs, gallops, or rubs. Lungs:  CTA bilaterally, no wheeze, rales or rhonchi  Abd: bowel sounds present, nontender, nondistended, no masses  Extrem:  No clubbing, cyanosis, or edema    CBC:   Lab Results   Component Value Date    WBC 8.7 03/10/2020    RBC 3.95 03/10/2020    HGB 11.8 03/10/2020    HCT 37.1 03/10/2020    MCV 93.9 03/10/2020    MCH 29.9 03/10/2020    MCHC 31.8 03/10/2020    RDW 13.5 03/10/2020     03/10/2020    MPV 9.7 03/10/2020     BMP:    Lab Results   Component Value Date     03/13/2020    K 3.8 03/13/2020    K 4.3 03/10/2020     03/13/2020    CO2 21 03/13/2020    BUN 25 03/13/2020    LABALBU 3.1 03/13/2020    LABALBU 4.3 08/03/2011    CREATININE 1.1 03/13/2020    CALCIUM 8.7 03/13/2020    GFRAA >60 03/13/2020    LABGLOM >60 03/13/2020    GLUCOSE 88 03/13/2020    GLUCOSE 90 08/03/2011     PT/INR:    Lab Results   Component Value Date    PROTIME 13.1 03/10/2020    INR 1.2 03/10/2020     Troponin:    Lab Results   Component Value Date    TROPONINI <0.01 03/10/2020       No results for input(s): LABURIN in the last 72 hours. No results for input(s): BC in the last 72 hours. No results for input(s): Erick Clap in the last 72 hours.       Current Facility-Administered Medications:     cefdinir (OMNICEF) capsule 300 mg, 300 mg, Oral, 2 times per day, Azul E Kaercher, DO, 300 mg at 03/13/20 1059    metroNIDAZOLE (FLAGYL) tablet 500 mg, 500 mg, Oral, 3 times per day, Azul E Kajuniorher, DO, 500 mg at 03/13/20 1345    Vitamin D (CHOLECALCIFEROL) tablet 1,000 Units, 1,000 Units, Oral, BID, Alberto Espinal MD, 1,000 Units at Cholelithiasis with choledocholithiasis    Nausea vomiting and diarrhea  Resolved Problems:    * No resolved hospital problems. *  Intermittent confusion  History of GERD  Moderate aortic stenosis    Plan:    lfts trending down  Dc home if able to ambulate    Vince Escobar  2:45 PM  3/13/2020    NOTE: This report was transcribed using voice recognition software.  Every effort was made to ensure accuracy; however, inadvertent transcription errors may be present

## 2020-03-14 VITALS
DIASTOLIC BLOOD PRESSURE: 82 MMHG | RESPIRATION RATE: 16 BRPM | TEMPERATURE: 98.5 F | OXYGEN SATURATION: 92 % | SYSTOLIC BLOOD PRESSURE: 137 MMHG | HEART RATE: 67 BPM | WEIGHT: 148.7 LBS | HEIGHT: 66 IN | BODY MASS INDEX: 23.9 KG/M2

## 2020-03-14 LAB
ALBUMIN SERPL-MCNC: 2.9 G/DL (ref 3.5–5.2)
ALP BLD-CCNC: 184 U/L (ref 40–129)
ALT SERPL-CCNC: 53 U/L (ref 0–40)
ANION GAP SERPL CALCULATED.3IONS-SCNC: 12 MMOL/L (ref 7–16)
AST SERPL-CCNC: 73 U/L (ref 0–39)
BILIRUB SERPL-MCNC: 0.9 MG/DL (ref 0–1.2)
BILIRUBIN DIRECT: 0.5 MG/DL (ref 0–0.3)
BILIRUBIN, INDIRECT: 0.4 MG/DL (ref 0–1)
BLOOD CULTURE, ROUTINE: NORMAL
BUN BLDV-MCNC: 19 MG/DL (ref 8–23)
CALCIUM SERPL-MCNC: 8.5 MG/DL (ref 8.6–10.2)
CHLORIDE BLD-SCNC: 103 MMOL/L (ref 98–107)
CO2: 24 MMOL/L (ref 22–29)
CREAT SERPL-MCNC: 1.1 MG/DL (ref 0.7–1.2)
GFR AFRICAN AMERICAN: >60
GFR NON-AFRICAN AMERICAN: >60 ML/MIN/1.73
GLUCOSE BLD-MCNC: 85 MG/DL (ref 74–99)
POTASSIUM SERPL-SCNC: 4 MMOL/L (ref 3.5–5)
SODIUM BLD-SCNC: 139 MMOL/L (ref 132–146)
TOTAL PROTEIN: 6 G/DL (ref 6.4–8.3)

## 2020-03-14 PROCEDURE — 80048 BASIC METABOLIC PNL TOTAL CA: CPT

## 2020-03-14 PROCEDURE — 80076 HEPATIC FUNCTION PANEL: CPT

## 2020-03-14 PROCEDURE — 36415 COLL VENOUS BLD VENIPUNCTURE: CPT

## 2020-03-14 PROCEDURE — 6370000000 HC RX 637 (ALT 250 FOR IP): Performed by: SURGERY

## 2020-03-14 PROCEDURE — 6370000000 HC RX 637 (ALT 250 FOR IP): Performed by: INTERNAL MEDICINE

## 2020-03-14 RX ADMIN — CEFDINIR 300 MG: 300 CAPSULE ORAL at 09:08

## 2020-03-14 RX ADMIN — Medication 1000 UNITS: at 09:08

## 2020-03-14 RX ADMIN — METRONIDAZOLE 500 MG: 500 TABLET, FILM COATED ORAL at 14:43

## 2020-03-14 RX ADMIN — CYCLOPENTOLATE HYDROCHLORIDE 2 TABLET: 10 SOLUTION/ DROPS OPHTHALMIC at 09:08

## 2020-03-14 RX ADMIN — PANTOPRAZOLE SODIUM 40 MG: 40 TABLET, DELAYED RELEASE ORAL at 06:34

## 2020-03-14 RX ADMIN — POLYVINYL ALCOHOL 1 DROP: 14 SOLUTION/ DROPS OPHTHALMIC at 09:09

## 2020-03-14 RX ADMIN — METRONIDAZOLE 500 MG: 500 TABLET, FILM COATED ORAL at 06:37

## 2020-03-14 RX ADMIN — VITAMIN D, TAB 1000IU (100/BT) 1000 UNITS: 25 TAB at 09:08

## 2020-03-14 ASSESSMENT — PAIN SCALES - GENERAL: PAINLEVEL_OUTOF10: 0

## 2020-03-14 NOTE — PROGRESS NOTES
Endoscopic SURGERY  DAILY PROGRESS NOTE  3/14/2020    Subjective:  Fever yesterday evening, resolved. S/p ERCP. Denies abdominal pain. Denies nausea,vomiting. WBC wnl. Objective:  BP (!) 149/71   Pulse 70   Temp 98.9 °F (37.2 °C) (Temporal)   Resp 16   Ht 5' 6\" (1.676 m)   Wt 148 lb 11.2 oz (67.4 kg)   SpO2 94%   BMI 24.00 kg/m²     GENERAL:  Laying in bed, awake, alert, cooperative, no apparent distress  HEAD: Normocephalic, atraumatic  EYES: No sclera icterus, pupils equal  LUNGS:  No increased work of breathing  CARDIOVASCULAR:  Regular rate  ABDOMEN:  Soft, non-tender, non-distended  EXTREMITIES: No edema or swelling  SKIN: Warm and dry    Assessment/Plan:  80 y.o. male with choledocholithiasis    S/p ERCP with stent placement  LFTs trending down  No leukocytosis, fever yesterday in evening but none further.   On rocephin, flagyl - change to oral omnicef, flagyl x 14d  Follow up with Dr. Ronak Moore in 2 weeks  Conchis Oliveira for discharge from surgical endoscopy pov      Electronically signed by Kassidy Castillo DO on 3/14/2020 at 7:52 AM

## 2020-03-14 NOTE — DISCHARGE SUMMARY
Physician Discharge Summary     Patient ID:  Ulises Angeles  [id number]  89 y.o.  5/12/1919    Admit date: 3/10/2020    Discharge date and time:  03/14/20     Admission Diagnoses:   Abdominal pain    Discharge Diagnoses:   Principal Problem (Resolved): Cholelithiasis with choledocholithiasis  Active Problems: Aortic stenosis, moderate    1st degree AV block  Resolved Problems:    Nausea vomiting and diarrhea       Consults: general surgery    Procedures:   ERCP summary:  1. ERCP sphincterotomy  2. ERCP stone extraction  3. Stent placement (Plastic 10 Fr x 7 cm)    Hospital Course:   Patient presented with cholelithiasis without evidence of a sending cholangitis. He underwent ERCP with successful stone retrieval and stent placement. His LFTs improved. On 3/13 evening he was getting ready for discharge when he had a low-grade temperature of 100.2. He did have a mild junky cough. He was not hypoxemic. Chest x-ray, urinalysis, and blood cultures were ordered; for some reason UA never was sent, but CXR is unremarkable and blood cultures are NGTD. He was observed for 24 more hours in the hospital with no further fevers or other abnormalities. He does still have a mild junky cough but appears to be breathing comfortably, not requiring oxygen. He did have a negative flu swab just a few days ago.      Discharge Exam:  Vitals:    03/13/20 1730 03/13/20 2035 03/13/20 2359 03/14/20 0903   BP:  (!) 154/75 (!) 149/71 137/82   Pulse:  75 70 67   Resp:  16 16 16   Temp: 100.2 °F (37.9 °C) 99.7 °F (37.6 °C) 98.9 °F (37.2 °C) 98.2 °F (36.8 °C)   TempSrc: Temporal Temporal Temporal Temporal   SpO2:  93% 94% 92%   Weight:       Height:          Gen: Super pleasant male in NAD  CV: RRR 1/6 MAJOR  Pulm: Slightly wheezy with a mild junky cough, but WOB is normal and breathing comfortably    Condition:  Stable    Disposition: home    Patient Instructions:   Current Discharge Medication List      START taking these medications

## 2020-03-18 LAB
BLOOD CULTURE, ROUTINE: NORMAL
CULTURE, BLOOD 2: NORMAL

## 2020-03-23 ENCOUNTER — TELEPHONE (OUTPATIENT)
Dept: SURGERY | Age: 85
End: 2020-03-23

## 2020-03-24 ENCOUNTER — HOSPITAL ENCOUNTER (OUTPATIENT)
Age: 85
Discharge: HOME | End: 2020-03-26
Payer: MEDICARE

## 2020-03-24 LAB
ALBUMIN SERPL-MCNC: 2.7 G/DL (ref 3.5–5.2)
ALP BLD-CCNC: 92 U/L (ref 40–129)
ALT SERPL-CCNC: 10 U/L (ref 0–40)
AMYLASE: 48 U/L (ref 20–100)
ANION GAP SERPL CALCULATED.3IONS-SCNC: 13 MMOL/L (ref 7–16)
AST SERPL-CCNC: 23 U/L (ref 0–39)
BILIRUB SERPL-MCNC: 0.8 MG/DL (ref 0–1.2)
BILIRUBIN DIRECT: 0.3 MG/DL (ref 0–0.3)
BILIRUBIN, INDIRECT: 0.5 MG/DL (ref 0–1)
BUN BLDV-MCNC: 19 MG/DL (ref 8–23)
CALCIUM SERPL-MCNC: 8.4 MG/DL (ref 8.6–10.2)
CHLORIDE BLD-SCNC: 106 MMOL/L (ref 98–107)
CO2: 19 MMOL/L (ref 22–29)
CREAT SERPL-MCNC: 0.9 MG/DL (ref 0.7–1.2)
GFR AFRICAN AMERICAN: >60
GFR NON-AFRICAN AMERICAN: >60 ML/MIN/1.73
GLUCOSE BLD-MCNC: 168 MG/DL (ref 74–99)
LIPASE: 32 U/L (ref 13–60)
POTASSIUM SERPL-SCNC: 4 MMOL/L (ref 3.5–5)
SODIUM BLD-SCNC: 138 MMOL/L (ref 132–146)
TOTAL PROTEIN: 6.1 G/DL (ref 6.4–8.3)

## 2020-03-24 PROCEDURE — 80076 HEPATIC FUNCTION PANEL: CPT

## 2020-03-24 PROCEDURE — 80048 BASIC METABOLIC PNL TOTAL CA: CPT

## 2020-03-24 PROCEDURE — 82150 ASSAY OF AMYLASE: CPT

## 2020-03-24 PROCEDURE — 36415 COLL VENOUS BLD VENIPUNCTURE: CPT

## 2020-03-24 PROCEDURE — 83690 ASSAY OF LIPASE: CPT

## 2020-03-25 ENCOUNTER — HOSPITAL ENCOUNTER (OUTPATIENT)
Age: 85
Discharge: HOME | End: 2020-03-27
Payer: MEDICARE

## 2020-03-25 ENCOUNTER — TELEPHONE (OUTPATIENT)
Dept: SURGERY | Age: 85
End: 2020-03-25

## 2020-03-25 LAB
BASOPHILS ABSOLUTE: 0.06 E9/L (ref 0–0.2)
BASOPHILS RELATIVE PERCENT: 0.8 % (ref 0–2)
EOSINOPHILS ABSOLUTE: 0.24 E9/L (ref 0.05–0.5)
EOSINOPHILS RELATIVE PERCENT: 3.3 % (ref 0–6)
HCT VFR BLD CALC: 36.4 % (ref 37–54)
HEMOGLOBIN: 11.7 G/DL (ref 12.5–16.5)
IMMATURE GRANULOCYTES #: 0.14 E9/L
IMMATURE GRANULOCYTES %: 1.9 % (ref 0–5)
LYMPHOCYTES ABSOLUTE: 1.24 E9/L (ref 1.5–4)
LYMPHOCYTES RELATIVE PERCENT: 17 % (ref 20–42)
MCH RBC QN AUTO: 30.3 PG (ref 26–35)
MCHC RBC AUTO-ENTMCNC: 32.1 % (ref 32–34.5)
MCV RBC AUTO: 94.3 FL (ref 80–99.9)
MONOCYTES ABSOLUTE: 0.78 E9/L (ref 0.1–0.95)
MONOCYTES RELATIVE PERCENT: 10.7 % (ref 2–12)
NEUTROPHILS ABSOLUTE: 4.83 E9/L (ref 1.8–7.3)
NEUTROPHILS RELATIVE PERCENT: 66.3 % (ref 43–80)
PDW BLD-RTO: 14.7 FL (ref 11.5–15)
PLATELET # BLD: 434 E9/L (ref 130–450)
PMV BLD AUTO: 9.3 FL (ref 7–12)
RBC # BLD: 3.86 E12/L (ref 3.8–5.8)
WBC # BLD: 7.3 E9/L (ref 4.5–11.5)

## 2020-03-25 PROCEDURE — 85025 COMPLETE CBC W/AUTO DIFF WBC: CPT

## 2020-03-26 ENCOUNTER — HOSPITAL ENCOUNTER (OUTPATIENT)
Age: 85
Discharge: HOME | End: 2020-03-28
Payer: MEDICARE

## 2020-03-26 LAB
ALBUMIN SERPL-MCNC: 2.4 G/DL (ref 3.5–5.2)
ALP BLD-CCNC: 86 U/L (ref 40–129)
ALT SERPL-CCNC: 8 U/L (ref 0–40)
AMYLASE: 46 U/L (ref 20–100)
ANION GAP SERPL CALCULATED.3IONS-SCNC: 14 MMOL/L (ref 7–16)
AST SERPL-CCNC: 23 U/L (ref 0–39)
BILIRUB SERPL-MCNC: 0.5 MG/DL (ref 0–1.2)
BILIRUBIN DIRECT: 0.2 MG/DL (ref 0–0.3)
BILIRUBIN, INDIRECT: 0.3 MG/DL (ref 0–1)
BUN BLDV-MCNC: 13 MG/DL (ref 8–23)
CALCIUM SERPL-MCNC: 8.2 MG/DL (ref 8.6–10.2)
CHLORIDE BLD-SCNC: 110 MMOL/L (ref 98–107)
CO2: 16 MMOL/L (ref 22–29)
CREAT SERPL-MCNC: 0.8 MG/DL (ref 0.7–1.2)
GFR AFRICAN AMERICAN: >60
GFR NON-AFRICAN AMERICAN: >60 ML/MIN/1.73
GLUCOSE BLD-MCNC: 172 MG/DL (ref 74–99)
POTASSIUM SERPL-SCNC: 4.1 MMOL/L (ref 3.5–5)
REASON FOR REJECTION: NORMAL
REJECTED TEST: NORMAL
SODIUM BLD-SCNC: 140 MMOL/L (ref 132–146)
TOTAL PROTEIN: 5.5 G/DL (ref 6.4–8.3)

## 2020-03-26 PROCEDURE — 80048 BASIC METABOLIC PNL TOTAL CA: CPT

## 2020-03-26 PROCEDURE — 80061 LIPID PANEL: CPT

## 2020-03-26 PROCEDURE — 83690 ASSAY OF LIPASE: CPT

## 2020-03-26 PROCEDURE — 82150 ASSAY OF AMYLASE: CPT

## 2020-03-26 PROCEDURE — 80076 HEPATIC FUNCTION PANEL: CPT

## 2020-03-27 LAB
CHOLESTEROL, TOTAL: ABNORMAL MG/DL (ref 0–199)
HDLC SERPL-MCNC: ABNORMAL MG/DL
LDL CHOLESTEROL CALCULATED: ABNORMAL MG/DL (ref 0–99)
LIPASE: 36 U/L (ref 13–60)
TRIGL SERPL-MCNC: ABNORMAL MG/DL (ref 0–149)
VLDLC SERPL CALC-MCNC: ABNORMAL MG/DL

## 2020-03-31 ENCOUNTER — VIRTUAL VISIT (OUTPATIENT)
Dept: SURGERY | Age: 85
End: 2020-03-31

## 2020-03-31 PROCEDURE — 99024 POSTOP FOLLOW-UP VISIT: CPT | Performed by: SURGERY

## 2020-03-31 NOTE — PROGRESS NOTES
General Surgery Office Note   LTAC, located within [facility]  Consandre JAYLIN. Kim Velasquez MD    TELEPHONE ENCOUNTER [id number]    Patient's Name/Date of Birth: Guero Young / 1919    Date: 2020     Surgeon: Kim Velasquez MD    Chief Complaint: No chief complaint on file. Patient Active Problem List   Diagnosis    Aortic stenosis, moderate    Acute blood loss anemia    Gastrointestinal hemorrhage    Gastroesophageal reflux disease with esophagitis    Vertebral artery occlusion    Dizziness    Moderate aortic stenosis by prior echocardiogram    1st degree AV block       Subjective: c/o persistent RUQ pain under rib cage. Denies nausea. Has been having urinary frequency, no burning. Labs reviewed    Objective: There were no vitals taken for this visit. Labs:  No results for input(s): WBC, HGB, HCT in the last 72 hours. Invalid input(s): PLR  Lab Results   Component Value Date    CREATININE 0.8 2020    BUN 13 2020     2020    K 4.1 2020     (H) 2020    CO2 16 (L) 2020     No results for input(s): LIPASE, AMYLASE in the last 72 hours.   BMP:    Lab Results   Component Value Date     2020    K 4.1 2020    K 4.3 03/10/2020     2020    CO2 16 2020    BUN 13 2020    LABALBU 2.4 2020    LABALBU 4.3 2011    CREATININE 0.8 2020    CALCIUM 8.2 2020    GFRAA >60 2020    LABGLOM >60 2020    GLUCOSE 172 2020    GLUCOSE 90 2011     Hepatic Function Panel:    Lab Results   Component Value Date    ALKPHOS 86 2020    ALT 8 2020    AST 23 2020    PROT 5.5 2020    BILITOT 0.5 2020    BILIDIR 0.2 2020    IBILI 0.3 2020    LABALBU 2.4 2020    LABALBU 4.3 2011     LIPASE:    Lab Results   Component Value Date    LIPASE 36 2020       Telephone encounter       Assessment/Plan:  Guero Young is a Arturo  [age] y.o. male s/p ERCP for

## 2020-04-06 ENCOUNTER — TELEPHONE (OUTPATIENT)
Dept: SURGERY | Age: 85
End: 2020-04-06

## 2020-04-14 ENCOUNTER — HOSPITAL ENCOUNTER (OUTPATIENT)
Age: 85
Discharge: HOME | End: 2020-04-16
Payer: MEDICARE

## 2020-04-14 ENCOUNTER — HOSPITAL ENCOUNTER (OUTPATIENT)
Age: 85
Discharge: HOME | End: 2020-04-14
Payer: MEDICARE

## 2020-04-14 ENCOUNTER — HOSPITAL ENCOUNTER (OUTPATIENT)
Dept: NUCLEAR MEDICINE | Age: 85
Discharge: HOME | End: 2020-04-14
Payer: MEDICARE

## 2020-04-14 ENCOUNTER — HOSPITAL ENCOUNTER (OUTPATIENT)
Dept: GENERAL RADIOLOGY | Age: 85
Discharge: HOME | End: 2020-04-16
Payer: MEDICARE

## 2020-04-14 LAB
BILIRUBIN URINE: NEGATIVE
BLOOD, URINE: NEGATIVE
CLARITY: CLEAR
COLOR: YELLOW
GLUCOSE URINE: NEGATIVE MG/DL
KETONES, URINE: NEGATIVE MG/DL
LEUKOCYTE ESTERASE, URINE: NEGATIVE
NITRITE, URINE: NEGATIVE
PH UA: 6 (ref 5–9)
PROTEIN UA: NEGATIVE MG/DL
SPECIFIC GRAVITY UA: 1.02 (ref 1–1.03)
UROBILINOGEN, URINE: 0.2 E.U./DL

## 2020-04-14 PROCEDURE — 71046 X-RAY EXAM CHEST 2 VIEWS: CPT

## 2020-04-14 PROCEDURE — 81003 URINALYSIS AUTO W/O SCOPE: CPT

## 2020-04-14 PROCEDURE — 3430000000 HC RX DIAGNOSTIC RADIOPHARMACEUTICAL: Performed by: RADIOLOGY

## 2020-04-14 PROCEDURE — A9537 TC99M MEBROFENIN: HCPCS | Performed by: RADIOLOGY

## 2020-04-14 PROCEDURE — 78226 HEPATOBILIARY SYSTEM IMAGING: CPT

## 2020-04-14 RX ADMIN — Medication 6 MILLICURIE: at 10:22

## 2020-04-16 ENCOUNTER — TELEPHONE (OUTPATIENT)
Dept: SURGERY | Age: 85
End: 2020-04-16

## 2020-04-16 NOTE — TELEPHONE ENCOUNTER
----- Message from Elzbieta Mcgowan sent at 4/13/2020 11:08 AM EDT -----  Regarding: Review HIDA and UA with Dr. Dalia Kumar  Patient is scheduled for HIDA and UA 4.14.2020 review results with Dr. Dalia Kumar to see if anything further is needed.

## 2020-04-16 NOTE — TELEPHONE ENCOUNTER
Call placed to patients daughter, Angelica Bird to inform her that per Dr. Nichole Mcmanus, HIDA Scan and Labs are good and no further testing is needed at this time. Follow up appointment scheduled for June to discuss ERCP with stent removal or sent replacement due to having plastic stent. Patient is scheduled for in office appointment and is willing to change to telephonic visit if needed for COVID.   Electronically signed by Bertha Verde on 4/16/20 at 9:00 AM EDT

## 2020-06-02 ENCOUNTER — VIRTUAL VISIT (OUTPATIENT)
Dept: SURGERY | Age: 85
End: 2020-06-02

## 2020-06-02 PROCEDURE — 99024 POSTOP FOLLOW-UP VISIT: CPT | Performed by: SURGERY

## 2020-06-02 NOTE — PROGRESS NOTES
input(s): PLR  Lab Results   Component Value Date    CREATININE 0.8 03/26/2020    BUN 13 03/26/2020     03/26/2020    K 4.1 03/26/2020     (H) 03/26/2020    CO2 16 (L) 03/26/2020     No results for input(s): LIPASE, AMYLASE in the last 72 hours.   BMP:    Lab Results   Component Value Date     03/26/2020    K 4.1 03/26/2020    K 4.3 03/10/2020     03/26/2020    CO2 16 03/26/2020    BUN 13 03/26/2020    LABALBU 2.4 03/26/2020    LABALBU 4.3 08/03/2011    CREATININE 0.8 03/26/2020    CALCIUM 8.2 03/26/2020    GFRAA >60 03/26/2020    LABGLOM >60 03/26/2020    GLUCOSE 172 03/26/2020    GLUCOSE 90 08/03/2011     Hepatic Function Panel:    Lab Results   Component Value Date    ALKPHOS 86 03/26/2020    ALT 8 03/26/2020    AST 23 03/26/2020    PROT 5.5 03/26/2020    BILITOT 0.5 03/26/2020    BILIDIR 0.2 03/26/2020    IBILI 0.3 03/26/2020    LABALBU 2.4 03/26/2020    LABALBU 4.3 08/03/2011     LIPASE:    Lab Results   Component Value Date    LIPASE 36 03/26/2020       Video encounter       Assessment/Plan:  Chani Chen is a 80 y.o. male s/p ERCP for choledocholithiasis    In light of patient being asymptomatic and advanced age, recommend leaving stent in place at this time  Patient understands stent may get clogged requiring re: ercp and stent exchange  Will follow up in 6 months    Physician Signature: Electronically signed by Dr. Enrique Ponce  6/2/2020  10:48 AM

## 2020-12-08 ENCOUNTER — TELEPHONE (OUTPATIENT)
Dept: SURGERY | Age: 85
End: 2020-12-08

## 2020-12-08 ENCOUNTER — HOSPITAL ENCOUNTER (OUTPATIENT)
Age: 85
Setting detail: SPECIMEN
Discharge: HOME | End: 2020-12-08
Payer: MEDICARE

## 2020-12-08 ENCOUNTER — OFFICE VISIT (OUTPATIENT)
Dept: SURGERY | Age: 85
End: 2020-12-08
Payer: MEDICARE

## 2020-12-08 VITALS
BODY MASS INDEX: 23.14 KG/M2 | TEMPERATURE: 97.8 F | HEART RATE: 61 BPM | WEIGHT: 144 LBS | DIASTOLIC BLOOD PRESSURE: 80 MMHG | HEIGHT: 66 IN | SYSTOLIC BLOOD PRESSURE: 123 MMHG

## 2020-12-08 PROCEDURE — U0003 INFECTIOUS AGENT DETECTION BY NUCLEIC ACID (DNA OR RNA); SEVERE ACUTE RESPIRATORY SYNDROME CORONAVIRUS 2 (SARS-COV-2) (CORONAVIRUS DISEASE [COVID-19]), AMPLIFIED PROBE TECHNIQUE, MAKING USE OF HIGH THROUGHPUT TECHNOLOGIES AS DESCRIBED BY CMS-2020-01-R: HCPCS

## 2020-12-08 PROCEDURE — 99213 OFFICE O/P EST LOW 20 MIN: CPT | Performed by: SURGERY

## 2020-12-08 RX ORDER — ONDANSETRON 4 MG/1
4 TABLET, FILM COATED ORAL 3 TIMES DAILY PRN
Qty: 30 TABLET | Refills: 0 | Status: SHIPPED
Start: 2020-12-08 | End: 2020-12-28 | Stop reason: SDUPTHER

## 2020-12-08 NOTE — TELEPHONE ENCOUNTER
Prior Authorization Form:      DEMOGRAPHICS:                     Patient Name:  Ann Kessler  Patient :  1919            Insurance:  Payor: Tina Baker / Plan: Elodia Amador PPO / Product Type: Medicare /   Insurance ID Number:    Payor/Plan Subscr  Sex Relation Sub. Ins. ID Effective Group Num   1.  Tina Olivia* Gerre Cockayne 1919 Male  [address] [birth year] [id number]                                    [id number]         DIAGNOSIS & PROCEDURE:                       Procedure/Operation: ERCP with stent exchange           CPT Code: 93093    Diagnosis:  Choledocholithiasis, stent in place    ICD10 Code: K80.50 + Z98.890    Location:  [address] [place] [address]    Surgeon:  Laura Llanos INFORMATION:                          Date: 20    Time: TBD              Anesthesia:  [facility]                                                       Status:  Outpatient        Special Comments:      Electronically signed by Brad Gardner RN on 2020 at 11:38 AM

## 2020-12-08 NOTE — TELEPHONE ENCOUNTER
Per Dr. Dong Webster, patient is scheduled for ERCP with stent exchange at [address] [place] [address] on 12/11/20. Surgery scheduled via telephone with Alvaro, surgeon's calendar updated. Dr. Dong Webster to enter orders. Follow up appointment scheduled.   Electronically signed by Andrew Valadez RN on 12/8/2020 at 11:38 AM

## 2020-12-08 NOTE — PROGRESS NOTES
General Surgery History and Physical  [facility]    Patient's Name/Date of Birth: Jennifer Martin / 5/12/1919    Date: December 8, 2020     Surgeon: Nader Godfrey MD    PCP: Laquita Sellers MD     Chief Complaint: Nausea    HPI:   Jennifer Martin is a 80 y.o. male who presents for evaluation of nausea. He has a history of cholecystitis and choledocholithiasis and 9 months ago had an ERCP with stone removal and stent placement. He has done fairly well for the last 9 months however recently has had increasing nausea and abdominal discomfort. He has not had vomiting. He denies any jaundice. No fevers or chills. Patient Active Problem List   Diagnosis    Aortic stenosis, moderate    Acute blood loss anemia    Gastrointestinal hemorrhage    Gastroesophageal reflux disease with esophagitis    Vertebral artery occlusion    Dizziness    Moderate aortic stenosis by prior echocardiogram    1st degree AV block       Past Medical History:   Diagnosis Date    Anemia     Aortic stenosis     Arthritis     Cm's esophagus with esophagitis     Scope every 2 years with Dr. Jovita Badillo.     Cancer (HCC)     melanoma x3    Cellulitis     Dizziness     Easy bruising     GERD (gastroesophageal reflux disease)     Heart murmur     Heartburn     History of stress test     Lightheadedness     Passed out     Stroke [facility])     Unspecified cerebral artery occlusion with cerebral infarction        Past Surgical History:   Procedure Laterality Date    CATARACT REMOVAL      ERCP N/A 3/11/2020    ERCP STONE REMOVAL performed by Nader Godfrey MD at [address] ERCP  3/11/2020    ERCP SPHINCTER/PAPILLOTOMY performed by Nader Godfrey MD at [address] [facility]      left hip    TONSILLECTOMY AND ADENOIDECTOMY      UPPER GASTROINTESTINAL ENDOSCOPY         No Known Allergies    The patient has a family history that is exchange  We discussed that the patient still has his gallbladder in place and this may be at the root of his problems. At his age and current functional status, I did not recommend cholecystectomy  ERCP was discussed at the last invasive procedure done under [facility]. The procedure, risks, benefits and alternatives were discussed. The patient and his daughter agreed to proceed.         Daryle Krabbe, MD  12:11 PM  12/8/2020

## 2020-12-09 ENCOUNTER — ANESTHESIA EVENT (OUTPATIENT)
Dept: OPERATING ROOM | Age: 85
End: 2020-12-09
Payer: MEDICARE

## 2020-12-09 LAB — SARS-COV-2, PCR: NOT DETECTED

## 2020-12-10 NOTE — DISCHARGE INSTR - COC
Continuity of Care Form    Patient Name: Dwayne Lopez   :  1919  MRN:  [id number]    Admit date:  (Not on file)  Discharge date:  ***    Code Status Order: Prior   Advance Directives:     Admitting Physician:  Duane Tam MD  PCP: Princess Cerda MD    Discharging Nurse: [facility] Unit/Room#: No information available for this encounter.   Discharging Unit Phone Number: ***    Emergency Contact:   Extended Emergency Contact Information  Primary Emergency Contact: Monisha Stanton  Address: [facility] Anais Lewis [address] [place] [address] Phone: [phone]  Relation: Spouse  Secondary Emergency Contact: Brianne Fields  Address: [address] [facility], [address] Phone: [phone]  Work Phone: [phone]  Relation: Child    Past Surgical History:  Past Surgical History:   Procedure Laterality Date    CATARACT REMOVAL      ERCP N/A 3/11/2020    ERCP STONE REMOVAL performed by Duane Tam MD [address]  ERCP  3/11/2020    ERCP SPHINCTER/PAPILLOTOMY performed by Duane Tam MD at [address]      left hip    TONSILLECTOMY AND ADENOIDECTOMY      UPPER GASTROINTESTINAL ENDOSCOPY         Immunization History:   Immunization History   Administered Date(s) Administered    Influenza Virus Vaccine 10/06/2015       Active Problems:  Patient Active Problem List   Diagnosis Code    Aortic stenosis, moderate I35.0    Acute blood loss anemia D62    Gastrointestinal hemorrhage K92.2    Gastroesophageal reflux disease with esophagitis K21.00    Vertebral artery occlusion I65.09    Dizziness R42    Moderate aortic stenosis by prior echocardiogram I35.0    1st degree AV block I44.0       Isolation/Infection:   Isolation          No Isolation        Patient Infection Status     Infection Onset Added Last Indicated Last Indicated By Review Planned Expiration Resolved Resolved By    JMARIA ESTHER Rule Out 03/10/20 03/10/20 03/10/20 Clostridium difficile, EIA (Ordered)        Resolved    COVID-19 Rule Out 12/08/20 12/08/20 12/08/20 COVID-19 Ambulatory (Ordered)   12/09/20 Rule-Out Test Resulted          Nurse Assessment:  Last Vital Signs: There were no vitals taken for this visit. Last documented pain score (0-10 scale):    Last Weight:   Wt Readings from Last 1 Encounters:   12/08/20 144 lb (65.3 kg)     Mental Status:  {IP PT MENTAL STATUS:20030}    IV Access:  [facility] [employer] NOEMY IV ACCESS:756173579}    Nursing Mobility/ADLs:  Walking   {CHP DME BAMV:432777040}  Transfer  {CHP DME NFOB:843019666}  Bathing  {CHP DME SLYK:754128062}  Dressing  {CHP DME YCXJ:990869049}  Toileting  {CHP DME JJMI:598138932}  Feeding  {CHP DME HNMH:931924022}  Med Admin  {CHP DME ICPJ:110994078}  Med Delivery   { NOEMY MED Delivery:870115215}    Wound Care Documentation and Therapy:        Elimination:  Continence:   · Bowel: {YES / UU:89235}  · Bladder: {YES / JL:16916}  Urinary Catheter: {Urinary Catheter:766610732}   Colostomy/Ileostomy/Ileal Conduit: {YES / YD:44319}       Date of Last BM: ***  No intake or output data in the 24 hours ending 12/10/20 1043  No intake/output data recorded.     Safety Concerns:     [employer] Safety Concerns:135855028}    Impairments/Disabilities:      [employer] Impairments/Disabilities:426022310}    Nutrition Therapy:  Current Nutrition Therapy:   [employer] Diet List:179802048}    Routes of Feeding: {CHP DME Other Feedings:684907865}  Liquids: {Slp liquid thickness:56412}  Daily Fluid Restriction: {CHP DME Yes amt example:875903248}  Last Modified Barium Swallow with Video (Video Swallowing Test): {Done Not Done KDCE:344135937}    Treatments at the Time of Hospital Discharge:   Respiratory Treatments: ***  Oxygen Therapy:  {Therapy; copd oxygen:22624}  Ventilator:    {MH CC Vent USYO:769291709}    Rehab Therapies: {THERAPEUTIC INTERVENTION:3467684531}  Weight Bearing Status/Restrictions: [address] CC Weight Bearin}  Other Medical Equipment (for information only, NOT a DME order):  {EQUIPMENT:613163333}  Other Treatments: ***    Patient's personal belongings (please select all that are sent with patient):  {CHP DME Belongings:367078674}    RN SIGNATURE:  {Esignature:130104484}    CASE MANAGEMENT/SOCIAL WORK SECTION    Inpatient Status Date: ***    Readmission Risk Assessment Score:  Readmission Risk              Risk of Unplanned Readmission:        0           Discharging to Facility/ Agency   · Name:   · Address:  · Phone:  · Fax:    Dialysis Facility (if applicable)   · Name:  · Address:  · Dialysis Schedule:  · Phone:  · Fax:    / signature: {Esignature:578270663}    PHYSICIAN SECTION    Prognosis: {Prognosis:6706072442}    Condition at Discharge: [address] Patient Condition:378251392}    Rehab Potential (if transferring to Rehab): {Prognosis:5054924282}    Recommended Labs or Other Treatments After Discharge: ***    Physician Certification: I certify the above information and transfer of Ann Kessler  is necessary for the continuing treatment of the diagnosis listed and that he requires {Admit to Appropriate Level of Care:34932} for {GREATER/LESS:622807957} 30 days.      Update Admission H&P: {CHP DME Changes in HVWOL:582344755}    PHYSICIAN SIGNATURE:  {Esignature:925897233}

## 2020-12-10 NOTE — PROGRESS NOTES
[facility]        Date: 12/10/2020    Date of surgery: 12/11/20   Arrival Time: Hospital will call you between 5pm and 7pm with your final arrival time for surgery    1. Do not eat or drink anything after midnight prior to surgery. This includes no water, chewing gum, mints or ice chips. 2. Take the following medications with a small sip of water on the morning of Surgery: prilosec, eye drops, prn zofran     3. Diabetics may take evening dose of insulin but none after midnight. If you feel symptomatic or low blood sugar morning of surgery drink 1-2 ounces of apple juice only. 4. Aspirin, Ibuprofen, Advil, Naproxen, Vitamin E and other Anti-inflammatory products should be stopped  before surgery  as directed by your physician. Take Tylenol only unless instructed otherwise by your surgeon. 5. Check with your Doctor regarding stopping Plavix, Coumadin, Lovenox, Eliquis, Effient, or other blood thinners. 6. Do not smoke,use illicit drugs and do not drink any alcoholic beverages 24 hours prior to surgery. 7. You may brush your teeth the morning of surgery. DO NOT SWALLOW WATER    8. You MUST make arrangements for a responsible adult to take you home after your surgery. You will not be allowed to leave alone or drive yourself home. It is strongly suggested someone stay with you the first 24 hrs. Your surgery will be cancelled if you do not have a ride home. 9. PEDIATRIC PATIENTS ONLY:  A parent/legal guardian must accompany a child scheduled for surgery and plan to stay at the hospital until the child is discharged. Please do not bring other children with you.     10. Please wear simple, loose fitting clothing to the hospital.  Do not bring valuables (money, credit cards, checkbooks, etc.) Do not wear any makeup (including no eye makeup) or nail polish on your fingers or toes. 11. DO NOT wear any jewelry or piercings on day of surgery. All body piercing jewelry must be removed. 12. Shower the night before surgery with _x__Antibacterial soap /DAWIT WIPES________    13. TOTAL JOINT REPLACEMENT/HYSTERECTOMY PATIENTS ONLY---Remember to bring Blood Bank bracelet to the hospital on the day of surgery. 14. If you have a Living Will and Durable Power of  for Healthcare, please bring in a copy. 15. If appropriate bring crutches, inspirex, WALKER, CANE etc... 12. Notify your Surgeon if you develop any illness between now and surgery time, cough, cold, fever, sore throat, nausea, vomiting, etc.  Please notify your surgeon if you experience dizziness, shortness of breath or blurred vision between now & the time of your surgery. 17. If you have _x__dentures, they will be removed before going to the OR; we will provide you a container. If you wear ___contact lenses or ___glasses, they will be removed; please bring a case for them. 18. To provide excellent care visitors will be limited to 1 in the room at any given time. 19. Please bring picture ID and insurance card. 20. Sleep apnea patients need to bring CPAP AND SETTINGS to hospital on day of surgery. 21. During flu season no children under the age of 15 are permitted in the hospital for the safety of all patients. 22. Other Please check at main lobby. Wear a mask. Please call AMBULATORY CARE if you have any further questions.    [address] [facility]     [address]

## 2020-12-11 ENCOUNTER — HOSPITAL ENCOUNTER (OUTPATIENT)
Age: 85
Setting detail: OUTPATIENT SURGERY
Discharge: HOME | End: 2020-12-11
Attending: SURGERY | Admitting: SURGERY
Payer: MEDICARE

## 2020-12-11 ENCOUNTER — ANESTHESIA (OUTPATIENT)
Dept: OPERATING ROOM | Age: 85
End: 2020-12-11
Payer: MEDICARE

## 2020-12-11 ENCOUNTER — HOSPITAL ENCOUNTER (OUTPATIENT)
Dept: GENERAL RADIOLOGY | Age: 85
Discharge: HOME | End: 2020-12-13
Attending: SURGERY
Payer: MEDICARE

## 2020-12-11 VITALS
HEART RATE: 53 BPM | SYSTOLIC BLOOD PRESSURE: 139 MMHG | BODY MASS INDEX: 23.14 KG/M2 | WEIGHT: 144 LBS | TEMPERATURE: 97.1 F | DIASTOLIC BLOOD PRESSURE: 65 MMHG | RESPIRATION RATE: 18 BRPM | OXYGEN SATURATION: 97 % | HEIGHT: 66 IN

## 2020-12-11 VITALS
RESPIRATION RATE: 14 BRPM | SYSTOLIC BLOOD PRESSURE: 134 MMHG | OXYGEN SATURATION: 99 % | DIASTOLIC BLOOD PRESSURE: 65 MMHG

## 2020-12-11 LAB
ANION GAP SERPL CALCULATED.3IONS-SCNC: 9 MMOL/L (ref 7–16)
BUN BLDV-MCNC: 18 MG/DL (ref 8–23)
CALCIUM SERPL-MCNC: 8.7 MG/DL (ref 8.6–10.2)
CHLORIDE BLD-SCNC: 105 MMOL/L (ref 98–107)
CO2: 26 MMOL/L (ref 22–29)
CREAT SERPL-MCNC: 1.1 MG/DL (ref 0.7–1.2)
GFR AFRICAN AMERICAN: >60
GFR NON-AFRICAN AMERICAN: >60 ML/MIN/1.73
GLUCOSE BLD-MCNC: 94 MG/DL (ref 74–99)
HCT VFR BLD CALC: 39.6 % (ref 37–54)
HEMOGLOBIN: 13.2 G/DL (ref 12.5–16.5)
MCH RBC QN AUTO: 30.9 PG (ref 26–35)
MCHC RBC AUTO-ENTMCNC: 33.3 % (ref 32–34.5)
MCV RBC AUTO: 92.7 FL (ref 80–99.9)
PDW BLD-RTO: 13.3 FL (ref 11.5–15)
PLATELET # BLD: 353 E9/L (ref 130–450)
PMV BLD AUTO: 9.5 FL (ref 7–12)
POTASSIUM REFLEX MAGNESIUM: 4.7 MMOL/L (ref 3.5–5)
RBC # BLD: 4.27 E12/L (ref 3.8–5.8)
SODIUM BLD-SCNC: 140 MMOL/L (ref 132–146)
WBC # BLD: 8.4 E9/L (ref 4.5–11.5)

## 2020-12-11 PROCEDURE — 2720000010 HC SURG SUPPLY STERILE: Performed by: SURGERY

## 2020-12-11 PROCEDURE — 6360000002 HC RX W HCPCS: Performed by: SURGERY

## 2020-12-11 PROCEDURE — 3600007513: Performed by: SURGERY

## 2020-12-11 PROCEDURE — 7100000011 HC PHASE II RECOVERY - ADDTL 15 MIN: Performed by: SURGERY

## 2020-12-11 PROCEDURE — 80048 BASIC METABOLIC PNL TOTAL CA: CPT

## 2020-12-11 PROCEDURE — 43264 ERCP REMOVE DUCT CALCULI: CPT | Performed by: SURGERY

## 2020-12-11 PROCEDURE — 6360000002 HC RX W HCPCS: Performed by: NURSE ANESTHETIST, CERTIFIED REGISTERED

## 2020-12-11 PROCEDURE — 36415 COLL VENOUS BLD VENIPUNCTURE: CPT

## 2020-12-11 PROCEDURE — 6360000004 HC RX CONTRAST MEDICATION: Performed by: SURGERY

## 2020-12-11 PROCEDURE — 3600007503: Performed by: SURGERY

## 2020-12-11 PROCEDURE — 43276 ERCP STENT EXCHANGE W/DILATE: CPT | Performed by: SURGERY

## 2020-12-11 PROCEDURE — C1874 STENT, COATED/COV W/DEL SYS: HCPCS | Performed by: SURGERY

## 2020-12-11 PROCEDURE — 74330 X-RAY BILE/PANC ENDOSCOPY: CPT

## 2020-12-11 PROCEDURE — 2580000003 HC RX 258: Performed by: NURSE ANESTHETIST, CERTIFIED REGISTERED

## 2020-12-11 PROCEDURE — 3700000000 HC ANESTHESIA ATTENDED CARE: Performed by: SURGERY

## 2020-12-11 PROCEDURE — 2580000003 HC RX 258: Performed by: ANESTHESIOLOGY

## 2020-12-11 PROCEDURE — 93005 ELECTROCARDIOGRAM TRACING: CPT | Performed by: ANESTHESIOLOGY

## 2020-12-11 PROCEDURE — 2709999900 HC NON-CHARGEABLE SUPPLY: Performed by: SURGERY

## 2020-12-11 PROCEDURE — C1769 GUIDE WIRE: HCPCS | Performed by: SURGERY

## 2020-12-11 PROCEDURE — 85027 COMPLETE CBC AUTOMATED: CPT

## 2020-12-11 PROCEDURE — 7100000010 HC PHASE II RECOVERY - FIRST 15 MIN: Performed by: SURGERY

## 2020-12-11 PROCEDURE — 3700000001 HC ADD 15 MINUTES (ANESTHESIA): Performed by: SURGERY

## 2020-12-11 DEVICE — STENT SYSTEM RMV
Type: IMPLANTABLE DEVICE | Status: FUNCTIONAL
Brand: WALLFLEX BILIARY

## 2020-12-11 RX ORDER — LIDOCAINE HYDROCHLORIDE 20 MG/ML
INJECTION, SOLUTION INTRAVENOUS PRN
Status: DISCONTINUED | OUTPATIENT
Start: 2020-12-11 | End: 2020-12-11 | Stop reason: SDUPTHER

## 2020-12-11 RX ORDER — PROPOFOL 10 MG/ML
INJECTION, EMULSION INTRAVENOUS CONTINUOUS PRN
Status: DISCONTINUED | OUTPATIENT
Start: 2020-12-11 | End: 2020-12-11 | Stop reason: SDUPTHER

## 2020-12-11 RX ORDER — SODIUM CHLORIDE 0.9 % (FLUSH) 0.9 %
10 SYRINGE (ML) INJECTION PRN
Status: DISCONTINUED | OUTPATIENT
Start: 2020-12-11 | End: 2020-12-11 | Stop reason: HOSPADM

## 2020-12-11 RX ORDER — SODIUM CHLORIDE, SODIUM LACTATE, POTASSIUM CHLORIDE, CALCIUM CHLORIDE 600; 310; 30; 20 MG/100ML; MG/100ML; MG/100ML; MG/100ML
INJECTION, SOLUTION INTRAVENOUS CONTINUOUS
Status: DISCONTINUED | OUTPATIENT
Start: 2020-12-11 | End: 2020-12-11 | Stop reason: HOSPADM

## 2020-12-11 RX ORDER — CIPROFLOXACIN 2 MG/ML
400 INJECTION, SOLUTION INTRAVENOUS ONCE
Status: COMPLETED | OUTPATIENT
Start: 2020-12-11 | End: 2020-12-11

## 2020-12-11 RX ORDER — SODIUM CHLORIDE, SODIUM LACTATE, POTASSIUM CHLORIDE, CALCIUM CHLORIDE 600; 310; 30; 20 MG/100ML; MG/100ML; MG/100ML; MG/100ML
INJECTION, SOLUTION INTRAVENOUS CONTINUOUS PRN
Status: DISCONTINUED | OUTPATIENT
Start: 2020-12-11 | End: 2020-12-11 | Stop reason: SDUPTHER

## 2020-12-11 RX ORDER — SODIUM CHLORIDE 0.9 % (FLUSH) 0.9 %
10 SYRINGE (ML) INJECTION EVERY 12 HOURS SCHEDULED
Status: DISCONTINUED | OUTPATIENT
Start: 2020-12-11 | End: 2020-12-11 | Stop reason: HOSPADM

## 2020-12-11 RX ADMIN — SODIUM CHLORIDE, POTASSIUM CHLORIDE, SODIUM LACTATE AND CALCIUM CHLORIDE: 600; 310; 30; 20 INJECTION, SOLUTION INTRAVENOUS at 12:07

## 2020-12-11 RX ADMIN — CIPROFLOXACIN 400 MG: 2 INJECTION INTRAVENOUS at 13:43

## 2020-12-11 RX ADMIN — SODIUM CHLORIDE, POTASSIUM CHLORIDE, SODIUM LACTATE AND CALCIUM CHLORIDE: 600; 310; 30; 20 INJECTION, SOLUTION INTRAVENOUS at 13:35

## 2020-12-11 RX ADMIN — LIDOCAINE HYDROCHLORIDE 50 MG: 20 INJECTION, SOLUTION INTRAVENOUS at 13:42

## 2020-12-11 RX ADMIN — PROPOFOL 50 MCG/KG/MIN: 10 INJECTION, EMULSION INTRAVENOUS at 13:42

## 2020-12-11 ASSESSMENT — PULMONARY FUNCTION TESTS
PIF_VALUE: 0
PIF_VALUE: 1
PIF_VALUE: 0
PIF_VALUE: 1
PIF_VALUE: 0
PIF_VALUE: 1
PIF_VALUE: 2
PIF_VALUE: 1
PIF_VALUE: 0
PIF_VALUE: 1
PIF_VALUE: 0
PIF_VALUE: 1

## 2020-12-11 ASSESSMENT — PAIN SCALES - GENERAL
PAINLEVEL_OUTOF10: 0
PAINLEVEL_OUTOF10: 0

## 2020-12-11 ASSESSMENT — PAIN - FUNCTIONAL ASSESSMENT: PAIN_FUNCTIONAL_ASSESSMENT: 0-10

## 2020-12-11 NOTE — ANESTHESIA PRE PROCEDURE
Department of Anesthesiology  Preprocedure Note       Name:  Prakash Herman   Age:  80 y.o.  :  1919                                          MRN:  [id number]         Date:  2020      Surgeon: Ana Jiménez):  Renetta Wood MD    Procedure: ERCP ENDOSCOPIC RETROGRADE CHOLANGIOPANCREATOGRAPHY WITH STENT EXCHANGE (N/A )    Medications prior to admission:   Prior to Admission medications    Medication Sig Start Date End Date Taking?  Authorizing Provider   ondansetron (ZOFRAN) 4 MG tablet Take 1 tablet by mouth 3 times daily as needed for Nausea or Vomiting 20  Yes Renetta Wood MD   omeprazole (PRILOSEC) 20 MG capsule Take 20 mg by mouth Daily    Yes Historical Provider, MD   vitamin E 1000 units capsule Take 1,000 Units by mouth daily    Historical Provider, MD   bevacizumab (AVASTIN) 2.5 mg/0.1 ml syringe Inject 2.5 mg into the eye once Every 6 weeks in the left eye    Historical Provider, MD   pravastatin (PRAVACHOL) 40 MG tablet Take 40 mg by mouth nightly    Historical Provider, MD   Multiple Vitamins-Minerals (MULTIVITAMIN PO) Take 1 tablet by mouth daily     Historical Provider, MD   Multiple Vitamins-Minerals (PRESERVISION AREDS) CAPS Take 1 capsule by mouth daily    Historical Provider, MD   carboxymethylcellulose (REFRESH CELLUVISC) 1 % ophthalmic solution Place 1 drop into both eyes 3 times daily    Historical Provider, MD   Vitamin D (CHOLECALCIFEROL) 1000 UNITS CAPS capsule Take 1,000 Units by mouth 2 times daily    Historical Provider, MD       Current medications:    Current Facility-Administered Medications   Medication Dose Route Frequency Provider Last Rate Last Dose    sodium chloride flush 0.9 % injection 10 mL  10 mL Intravenous 2 times per day Renetta Wood MD        sodium chloride flush 0.9 % injection 10 mL  10 mL Intravenous PRN Renetta Wood MD        ciprofloxacin (CIPRO) IVPB 400 mg  400 mg Intravenous Once Renetta Wood MD        lactated ringers infusion   Intravenous Continuous Moody Vo MD 50 mL/hr at 12/11/20 1207         Allergies:  No Known Allergies    Problem List:    Patient Active Problem List   Diagnosis Code    Aortic stenosis, moderate I35.0    Acute blood loss anemia D62    Gastrointestinal hemorrhage K92.2    Gastroesophageal reflux disease with esophagitis K21.00    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 Meena Soto.  Cancer (HCC)     melanoma x3    Cellulitis     Dizziness     Easy bruising     GERD (gastroesophageal reflux disease)     H/O emphysema     Heart murmur     Heartburn     History of stress test     Lightheadedness     Macular degeneration     Passed out     Stroke [facility])     Unspecified cerebral artery occlusion with cerebral infarction        Past Surgical History:        Procedure Laterality Date    CATARACT REMOVAL      ERCP N/A 3/11/2020    ERCP STONE REMOVAL performed by Judi Hernández MD at [address] ERCP  3/11/2020    ERCP SPHINCTER/PAPILLOTOMY [facility]      left hip    TONSILLECTOMY AND ADENOIDECTOMY      UPPER GASTROINTESTINAL ENDOSCOPY         Social History:    Social History     Tobacco Use    Smoking status: Former Smoker    Smokeless tobacco: Never Used    Tobacco comment: quit smoking cigars a very long time ago   Substance Use Topics    Alcohol use:  No                                Counseling given: Not Answered  Comment: quit smoking cigars a very long time ago      Vital Signs (Current):   Vitals:    12/11/20 1149   BP: (!) 118/57   Pulse: 63   Resp: 16   Temp: 97.8 °F (36.6 °C) TempSrc: Infrared   SpO2: 96%   Weight: 144 lb (65.3 kg)   Height: 5' 6\" (1.676 m)                                              BP Readings from Last 3 Encounters:   12/11/20 (!) 118/57   12/08/20 123/80   03/14/20 137/82       NPO Status: Time of last liquid consumption: 1000(sip water)                        Time of last solid consumption: 1700                        Date of last liquid consumption: 12/11/20                        Date of last solid food consumption: 12/10/20    BMI:   Wt Readings from Last 3 Encounters:   12/11/20 144 lb (65.3 kg)   12/08/20 144 lb (65.3 kg)   03/10/20 148 lb 11.2 oz (67.4 kg)     Body mass index is 23.24 kg/m². CBC:   Lab Results   Component Value Date    WBC 8.4 12/11/2020    RBC 4.27 12/11/2020    HGB 13.2 12/11/2020    HCT 39.6 12/11/2020    MCV 92.7 12/11/2020    RDW 13.3 12/11/2020     12/11/2020       CMP:   Lab Results   Component Value Date     03/26/2020    K 4.1 03/26/2020    K 4.3 03/10/2020     03/26/2020    CO2 16 03/26/2020    BUN 13 03/26/2020    CREATININE 0.8 03/26/2020    GFRAA >60 03/26/2020    LABGLOM >60 03/26/2020    GLUCOSE 172 03/26/2020    GLUCOSE 90 08/03/2011    PROT 5.5 03/26/2020    CALCIUM 8.2 03/26/2020    BILITOT 0.5 03/26/2020    ALKPHOS 86 03/26/2020    AST 23 03/26/2020    ALT 8 03/26/2020       POC Tests: No results for input(s): POCGLU, POCNA, POCK, POCCL, POCBUN, POCHEMO, POCHCT in the last 72 hours.     Coags:   Lab Results   Component Value Date    PROTIME 13.1 03/10/2020    INR 1.2 03/10/2020    APTT 25.8 11/22/2015       HCG (If Applicable): No results found for: PREGTESTUR, PREGSERUM, HCG, HCGQUANT     ABGs: No results found for: PHART, PO2ART, QYY7YNL, DQV0BOG, BEART, G8MVIMMU     Type & Screen (If Applicable):  No results found for: LABABO, [address]    Anesthesia Evaluation  Patient summary reviewed no history of anesthetic complications:   Airway: Mallampati: II  TM distance: >3 FB   Neck ROM: full  Mouth opening: > = 3 FB Dental:    (+) upper dentures and lower dentures      Pulmonary:Negative Pulmonary ROS breath sounds clear to auscultation                             Cardiovascular:    (+) valvular problems/murmurs (Moderate AS): AS,         Rhythm: regular  Rate: normal                    Neuro/Psych:   (+) CVA:,             GI/Hepatic/Renal:   (+) GERD:,          ROS comment: Cholelithiasis with choledocholithiasis  Cm's esophagus with esophagitis. Endo/Other:    (+) : arthritis: OA., .                 Abdominal:         (-) obese     Vascular: negative vascular ROS. Anesthesia Plan      MAC     ASA 3       Induction: intravenous. MIPS: Postoperative opioids intended and Prophylactic antiemetics administered. Anesthetic plan and risks discussed with patient and child/children. Plan discussed with CRNA. DOS STAFF ADDENDUM:    Pt seen and examined, chart reviewed (including anesthesia, drug and allergy history). Anesthetic plan, risks, benefits, alternatives, and personnel involved discussed with patient. Patient verbalized an understanding and agrees to proceed. Plan discussed with care team members and agreed upon.     Concepcion Walsh MD  Staff Anesthesiologist  12:41 PM      Concepcion Walsh MD   12/11/2020

## 2020-12-11 NOTE — ANESTHESIA POSTPROCEDURE EVALUATION
Department of Anesthesiology  Postprocedure Note    Patient: Santy Easley  MRN: [id number]  YOB: 1919  Date of evaluation: 12/11/2020  Time:  5:03 PM     Procedure Summary     Date:  12/11/20 Room / Location:  [address] [place] [address] [facility]    Anesthesia Start:  9432 Anesthesia Stop:  0683    Procedures:       ERCP STENT REMOVAL/EXCHANGE (N/A )      ERCP STONE REMOVAL (N/A ) Diagnosis:  (CHOLEDOCHOLITHIASIS)    Surgeon:  Ramon Hollis MD Responsible Provider:  Stefany Mota MD    Anesthesia Type:  MAC ASA Status:  3          Anesthesia Type: MAC    Evelyn Phase I: Evelyn Score: 10    Evelyn Phase II: Evelyn Score: 10    Last vitals: Reviewed and per EMR flowsheets.        Anesthesia Post Evaluation    Patient location during evaluation: PACU  Patient participation: complete - patient participated  Level of consciousness: awake  Airway patency: patent  Nausea & Vomiting: no nausea and no vomiting  Complications: no  Cardiovascular status: hemodynamically stable  Respiratory status: acceptable  Hydration status: euvolemic

## 2020-12-11 NOTE — OP NOTE
SURGEON: Gold Atwood MD        PREOPERATIVE DIAGNOSIS: Choledocholithiasis      POSTOPERATIVE DIAGNOSES:  1. Choledocholithiasis  2. Biliary stricture      OPERATION:  1. ERCP stone extraction  2. ERCP removal and replacement of bile duct stent (10 mm x 60 mm fully covered wall flex)    ANESTHESIA: LMAC       ESTIMATED BLOOD LOSS: Minimal.         INDICATION: This is a 80 y.o. male with radiologic evidence of choledocholithiasis and abdominal pain. The patient agreed to undergo the ERCP. The risks, benefits, and alternatives were explained to the patient for the procedure including bleeding, infection, perforation, and pancreatitis. The patient understood and agreed to proceed. DESCRIPTION OF PROCEDURE: The patient was brought to the operating room and he underwent [facility] anesthesia by the anesthesia team. He was then placed in the supine position. The flexible duodenoscope was inserted down the oropharynx and passed down the esophagus into the stomach and into the duodenum. The papilla was identified. A plastic stent was seen coming out of the ampulla. The stent was grasped with snare and removed without difficulty. A sphincterotome was then used to gain access into the common bile duct. A balloon was passed and contrast injection revealed adequate cannulation. Multiple sweeps were made of the common bile duct, a large amount of debris and stones were removed. There was a stricture in the mid to distal common bile duct I was noticed on cholangiogram.  Access into the patient's duodenum was difficult throughout the case and due to his age and difficulty of the procedure, decision was made not to perform any brushings at this time. A 10 mm x 60 mm fully covered wall flex stent was advanced into the common bile duct. The stent upon deployment had migrated up to the level of the stricture.   The stent was grasped with rat-tooth forceps and pulled down into the duodenum and was in adequate position with adequate overlap on either side of the stricture. There was opacification of the gallbladder on a cholangiogram signifying a patent cystic duct. There were no other abnormalities    There was good bile flow. The scope was then withdrawn. The patient tolerated the procedure well.       Emmy Hui MD

## 2020-12-12 LAB
EKG ATRIAL RATE: 56 BPM
EKG P AXIS: 69 DEGREES
EKG P-R INTERVAL: 242 MS
EKG Q-T INTERVAL: 406 MS
EKG QRS DURATION: 104 MS
EKG QTC CALCULATION (BAZETT): 391 MS
EKG R AXIS: -26 DEGREES
EKG T AXIS: 59 DEGREES
EKG VENTRICULAR RATE: 56 BPM

## 2020-12-14 ENCOUNTER — TELEPHONE (OUTPATIENT)
Dept: SURGERY | Age: 85
End: 2020-12-14

## 2020-12-14 ENCOUNTER — TELEPHONE (OUTPATIENT)
Dept: GENERAL RADIOLOGY | Age: 85
End: 2020-12-14

## 2020-12-14 PROBLEM — K86.2 PANCREATIC CYST: Status: ACTIVE | Noted: 2020-12-14

## 2020-12-14 PROBLEM — Z98.890 HISTORY OF BILIARY DUCT STENT PLACEMENT: Status: ACTIVE | Noted: 2020-12-14

## 2020-12-14 PROBLEM — K80.50 CHOLEDOCHOLITHIASIS: Status: ACTIVE | Noted: 2020-12-14

## 2020-12-14 NOTE — TELEPHONE ENCOUNTER
Per the order of Dr. Ida Davalos, patient has been scheduled for CT Abdomen and Pelvis with and without contrast on 12.15.2020 at 2:00 pm.      Authorization will be scanned into chart once received. Call placed to patients daughter to provide her with instructions as well as date and time of testing. Patient will also have labs drawn prior to imaging.   Electronically signed by Li Kwong on 12/14/20 at 2:35 PM EST

## 2020-12-14 NOTE — TELEPHONE ENCOUNTER
I called the patient's insurance [employer] and I spoke with the automated system, which shared with me that CPT code [id number] required authorization through [address]. I called [facility] and I spoke with [facility] authorization rep., she stated clinicals would be needed and transferred me to Neville mcneal clinical nurse. I provided her with clinicals which got CPT code [id number] approved. Patient will be called and schedule with Dr. [place] office.        Electronically signed by Sujata Jaimes on 12/14/20 at 2:16 PM EST

## 2020-12-14 NOTE — TELEPHONE ENCOUNTER
Patients daughter call and asked to proceed with scheduling testing as discussed with Dr. Pal Ruelas. Per the order of Dr. Pal Ruelas, patient will need to have CA 19-9, CEA, BMP and CTA Triphasic Pancreas to rule out pancreatic mass. Orders placed this encounter.   Electronically signed by Dean Delcid on 12/14/20 at 10:30 AM EST

## 2020-12-15 ENCOUNTER — HOSPITAL ENCOUNTER (OUTPATIENT)
Dept: CT IMAGING | Age: 85
Discharge: HOME | End: 2020-12-17
Payer: MEDICARE

## 2020-12-15 ENCOUNTER — HOSPITAL ENCOUNTER (OUTPATIENT)
Age: 85
Discharge: HOME | End: 2020-12-15
Payer: MEDICARE

## 2020-12-15 LAB
ANION GAP SERPL CALCULATED.3IONS-SCNC: 9 MMOL/L (ref 7–16)
BUN BLDV-MCNC: 20 MG/DL (ref 8–23)
CALCIUM SERPL-MCNC: 8.8 MG/DL (ref 8.6–10.2)
CEA: 2.1 NG/ML (ref 0–5.2)
CHLORIDE BLD-SCNC: 103 MMOL/L (ref 98–107)
CO2: 24 MMOL/L (ref 22–29)
CREAT SERPL-MCNC: 1.1 MG/DL (ref 0.7–1.2)
GFR AFRICAN AMERICAN: >60
GFR NON-AFRICAN AMERICAN: >60 ML/MIN/1.73
GLUCOSE BLD-MCNC: 87 MG/DL (ref 74–99)
POTASSIUM SERPL-SCNC: 4.1 MMOL/L (ref 3.5–5)
SODIUM BLD-SCNC: 136 MMOL/L (ref 132–146)

## 2020-12-15 PROCEDURE — 74170 CT ABD WO CNTRST FLWD CNTRST: CPT

## 2020-12-15 PROCEDURE — 36415 COLL VENOUS BLD VENIPUNCTURE: CPT

## 2020-12-15 PROCEDURE — 82378 CARCINOEMBRYONIC ANTIGEN: CPT

## 2020-12-15 PROCEDURE — 80048 BASIC METABOLIC PNL TOTAL CA: CPT

## 2020-12-15 PROCEDURE — 6360000004 HC RX CONTRAST MEDICATION: Performed by: RADIOLOGY

## 2020-12-15 PROCEDURE — 86301 IMMUNOASSAY TUMOR CA 19-9: CPT

## 2020-12-15 RX ORDER — SODIUM CHLORIDE 0.9 % (FLUSH) 0.9 %
10 SYRINGE (ML) INJECTION PRN
Status: DISCONTINUED | OUTPATIENT
Start: 2020-12-15 | End: 2020-12-18 | Stop reason: HOSPADM

## 2020-12-15 RX ADMIN — IOPAMIDOL 90 ML: 755 INJECTION, SOLUTION INTRAVENOUS at 15:59

## 2020-12-17 LAB — CA 19-9: 30 U/ML (ref 0–37)

## 2020-12-28 ENCOUNTER — TELEPHONE (OUTPATIENT)
Dept: SURGERY | Age: 85
End: 2020-12-28

## 2020-12-28 RX ORDER — ONDANSETRON 4 MG/1
4 TABLET, FILM COATED ORAL 3 TIMES DAILY PRN
Qty: 90 TABLET | Refills: 1 | Status: SHIPPED
Start: 2020-12-28 | End: 2022-08-07

## 2020-12-28 NOTE — TELEPHONE ENCOUNTER
Patients daughter called in to request refill of nausea medication. Patient is also requesting refill that is a 90 day supply that will be covered under insurance. Refill sent to patients pharmacy.   Electronically signed by Mary Alice Merino on 12/28/20 at 9:14 AM EST

## 2021-02-01 ENCOUNTER — HOSPITAL ENCOUNTER (INPATIENT)
Age: 86
LOS: 4 days | Discharge: HOME | DRG: 908 | End: 2021-02-05
Attending: EMERGENCY MEDICINE | Admitting: INTERNAL MEDICINE
Payer: MEDICARE

## 2021-02-01 ENCOUNTER — APPOINTMENT (OUTPATIENT)
Dept: CT IMAGING | Age: 86
DRG: 908 | End: 2021-02-01
Payer: MEDICARE

## 2021-02-01 DIAGNOSIS — T85.590A COMMON BILE DUCT OBSTRUCTION SECONDARY TO BILIARY STENT: ICD-10-CM

## 2021-02-01 DIAGNOSIS — R79.89 ELEVATED LACTIC ACID LEVEL: ICD-10-CM

## 2021-02-01 DIAGNOSIS — K62.5 BRIGHT RED RECTAL BLEEDING: Primary | ICD-10-CM

## 2021-02-01 DIAGNOSIS — K83.1 BILIARY OBSTRUCTION: ICD-10-CM

## 2021-02-01 DIAGNOSIS — R53.1 GENERALIZED WEAKNESS: ICD-10-CM

## 2021-02-01 PROBLEM — K92.2 ACUTE GI BLEEDING: Status: ACTIVE | Noted: 2021-02-01

## 2021-02-01 LAB
ABO/RH: NORMAL
ALBUMIN SERPL-MCNC: 3.1 G/DL (ref 3.5–5.2)
ALBUMIN SERPL-MCNC: 3.1 G/DL (ref 3.5–5.2)
ALP BLD-CCNC: 472 U/L (ref 40–129)
ALP BLD-CCNC: 495 U/L (ref 40–129)
ALT SERPL-CCNC: 69 U/L (ref 0–40)
ALT SERPL-CCNC: 73 U/L (ref 0–40)
ANION GAP SERPL CALCULATED.3IONS-SCNC: 9 MMOL/L (ref 7–16)
ANTIBODY SCREEN: NORMAL
AST SERPL-CCNC: 68 U/L (ref 0–39)
AST SERPL-CCNC: 75 U/L (ref 0–39)
BACTERIA: ABNORMAL /HPF
BASOPHILS ABSOLUTE: 0.06 E9/L (ref 0–0.2)
BASOPHILS RELATIVE PERCENT: 1 % (ref 0–2)
BILIRUB SERPL-MCNC: 2.9 MG/DL (ref 0–1.2)
BILIRUB SERPL-MCNC: 3.6 MG/DL (ref 0–1.2)
BILIRUBIN DIRECT: 2.7 MG/DL (ref 0–0.3)
BILIRUBIN URINE: ABNORMAL
BILIRUBIN, INDIRECT: 0.9 MG/DL (ref 0–1)
BLOOD, URINE: NEGATIVE
BUN BLDV-MCNC: 18 MG/DL (ref 8–23)
CALCIUM SERPL-MCNC: 8.2 MG/DL (ref 8.6–10.2)
CHLORIDE BLD-SCNC: 113 MMOL/L (ref 98–107)
CLARITY: CLEAR
CO2: 23 MMOL/L (ref 22–29)
COLOR: YELLOW
CREAT SERPL-MCNC: 0.9 MG/DL (ref 0.7–1.2)
EOSINOPHILS ABSOLUTE: 0.32 E9/L (ref 0.05–0.5)
EOSINOPHILS RELATIVE PERCENT: 5.2 % (ref 0–6)
GFR AFRICAN AMERICAN: >60
GFR NON-AFRICAN AMERICAN: >60 ML/MIN/1.73
GLUCOSE BLD-MCNC: 120 MG/DL (ref 74–99)
GLUCOSE URINE: NEGATIVE MG/DL
HCT VFR BLD CALC: 34.4 % (ref 37–54)
HCT VFR BLD CALC: 35.4 % (ref 37–54)
HEMOGLOBIN: 11.1 G/DL (ref 12.5–16.5)
HEMOGLOBIN: 11.5 G/DL (ref 12.5–16.5)
IMMATURE GRANULOCYTES #: 0.07 E9/L
IMMATURE GRANULOCYTES %: 1.1 % (ref 0–5)
KETONES, URINE: NEGATIVE MG/DL
LACTIC ACID, SEPSIS: 0.7 MMOL/L (ref 0.5–1.9)
LACTIC ACID, SEPSIS: 2.9 MMOL/L (ref 0.5–1.9)
LEUKOCYTE ESTERASE, URINE: NEGATIVE
LIPASE: 24 U/L (ref 13–60)
LIPASE: 28 U/L (ref 13–60)
LYMPHOCYTES ABSOLUTE: 1.17 E9/L (ref 1.5–4)
LYMPHOCYTES RELATIVE PERCENT: 19 % (ref 20–42)
MCH RBC QN AUTO: 30.8 PG (ref 26–35)
MCHC RBC AUTO-ENTMCNC: 32.5 % (ref 32–34.5)
MCV RBC AUTO: 94.9 FL (ref 80–99.9)
MONOCYTES ABSOLUTE: 0.65 E9/L (ref 0.1–0.95)
MONOCYTES RELATIVE PERCENT: 10.6 % (ref 2–12)
NEUTROPHILS ABSOLUTE: 3.89 E9/L (ref 1.8–7.3)
NEUTROPHILS RELATIVE PERCENT: 63.1 % (ref 43–80)
NITRITE, URINE: NEGATIVE
PDW BLD-RTO: 15.6 FL (ref 11.5–15)
PH UA: 5 (ref 5–9)
PLATELET # BLD: 358 E9/L (ref 130–450)
PMV BLD AUTO: 9.9 FL (ref 7–12)
POTASSIUM REFLEX MAGNESIUM: 4.2 MMOL/L (ref 3.5–5)
PRO-BNP: 1097 PG/ML (ref 0–450)
PROTEIN UA: NEGATIVE MG/DL
RBC # BLD: 3.73 E12/L (ref 3.8–5.8)
RBC UA: ABNORMAL /HPF (ref 0–2)
REASON FOR REJECTION: NORMAL
REJECTED TEST: NORMAL
SODIUM BLD-SCNC: 145 MMOL/L (ref 132–146)
SPECIFIC GRAVITY UA: 1.01 (ref 1–1.03)
TOTAL PROTEIN: 5.8 G/DL (ref 6.4–8.3)
TOTAL PROTEIN: 6 G/DL (ref 6.4–8.3)
TROPONIN: <0.01 NG/ML (ref 0–0.03)
UROBILINOGEN, URINE: 1 E.U./DL
WBC # BLD: 6.2 E9/L (ref 4.5–11.5)
WBC UA: ABNORMAL /HPF (ref 0–5)

## 2021-02-01 PROCEDURE — 80076 HEPATIC FUNCTION PANEL: CPT

## 2021-02-01 PROCEDURE — 74177 CT ABD & PELVIS W/CONTRAST: CPT

## 2021-02-01 PROCEDURE — C9113 INJ PANTOPRAZOLE SODIUM, VIA: HCPCS | Performed by: EMERGENCY MEDICINE

## 2021-02-01 PROCEDURE — 83605 ASSAY OF LACTIC ACID: CPT

## 2021-02-01 PROCEDURE — 86901 BLOOD TYPING SEROLOGIC RH(D): CPT

## 2021-02-01 PROCEDURE — 2580000003 HC RX 258: Performed by: RADIOLOGY

## 2021-02-01 PROCEDURE — 6360000002 HC RX W HCPCS: Performed by: EMERGENCY MEDICINE

## 2021-02-01 PROCEDURE — 6360000004 HC RX CONTRAST MEDICATION: Performed by: RADIOLOGY

## 2021-02-01 PROCEDURE — 99285 EMERGENCY DEPT VISIT HI MDM: CPT

## 2021-02-01 PROCEDURE — 80053 COMPREHEN METABOLIC PANEL: CPT

## 2021-02-01 PROCEDURE — 36415 COLL VENOUS BLD VENIPUNCTURE: CPT

## 2021-02-01 PROCEDURE — 86900 BLOOD TYPING SEROLOGIC ABO: CPT

## 2021-02-01 PROCEDURE — 85014 HEMATOCRIT: CPT

## 2021-02-01 PROCEDURE — 96374 THER/PROPH/DIAG INJ IV PUSH: CPT

## 2021-02-01 PROCEDURE — 83690 ASSAY OF LIPASE: CPT

## 2021-02-01 PROCEDURE — 86850 RBC ANTIBODY SCREEN: CPT

## 2021-02-01 PROCEDURE — 85018 HEMOGLOBIN: CPT

## 2021-02-01 PROCEDURE — 2580000003 HC RX 258: Performed by: INTERNAL MEDICINE

## 2021-02-01 PROCEDURE — 85025 COMPLETE CBC W/AUTO DIFF WBC: CPT

## 2021-02-01 PROCEDURE — 81001 URINALYSIS AUTO W/SCOPE: CPT

## 2021-02-01 PROCEDURE — 99222 1ST HOSP IP/OBS MODERATE 55: CPT | Performed by: SURGERY

## 2021-02-01 PROCEDURE — 84484 ASSAY OF TROPONIN QUANT: CPT

## 2021-02-01 PROCEDURE — 2580000003 HC RX 258: Performed by: STUDENT IN AN ORGANIZED HEALTH CARE EDUCATION/TRAINING PROGRAM

## 2021-02-01 PROCEDURE — 83880 ASSAY OF NATRIURETIC PEPTIDE: CPT

## 2021-02-01 PROCEDURE — 2060000000 HC ICU INTERMEDIATE R&B

## 2021-02-01 RX ORDER — SODIUM CHLORIDE 9 MG/ML
INJECTION, SOLUTION INTRAVENOUS CONTINUOUS
Status: DISCONTINUED | OUTPATIENT
Start: 2021-02-01 | End: 2021-02-05 | Stop reason: HOSPADM

## 2021-02-01 RX ORDER — VIT C/E/ZN/COPPR/LUTEIN/ZEAXAN 60 MG-6 MG
1 CAPSULE ORAL DAILY
Status: DISCONTINUED | OUTPATIENT
Start: 2021-02-02 | End: 2021-02-05 | Stop reason: HOSPADM

## 2021-02-01 RX ORDER — SODIUM CHLORIDE 0.9 % (FLUSH) 0.9 %
10 SYRINGE (ML) INJECTION
Status: COMPLETED | OUTPATIENT
Start: 2021-02-01 | End: 2021-02-01

## 2021-02-01 RX ORDER — 0.9 % SODIUM CHLORIDE 0.9 %
500 INTRAVENOUS SOLUTION INTRAVENOUS ONCE
Status: COMPLETED | OUTPATIENT
Start: 2021-02-01 | End: 2021-02-01

## 2021-02-01 RX ORDER — PANTOPRAZOLE SODIUM 40 MG/10ML
40 INJECTION, POWDER, LYOPHILIZED, FOR SOLUTION INTRAVENOUS DAILY
Status: DISCONTINUED | OUTPATIENT
Start: 2021-02-02 | End: 2021-02-04

## 2021-02-01 RX ORDER — POLYVINYL ALCOHOL 14 MG/ML
1 SOLUTION/ DROPS OPHTHALMIC 3 TIMES DAILY
Status: DISCONTINUED | OUTPATIENT
Start: 2021-02-02 | End: 2021-02-05 | Stop reason: HOSPADM

## 2021-02-01 RX ORDER — SODIUM CHLORIDE 9 MG/ML
10 INJECTION INTRAVENOUS DAILY
Status: DISCONTINUED | OUTPATIENT
Start: 2021-02-02 | End: 2021-02-04

## 2021-02-01 RX ORDER — PANTOPRAZOLE SODIUM 40 MG/10ML
40 INJECTION, POWDER, LYOPHILIZED, FOR SOLUTION INTRAVENOUS ONCE
Status: COMPLETED | OUTPATIENT
Start: 2021-02-01 | End: 2021-02-01

## 2021-02-01 RX ADMIN — IOPAMIDOL 90 ML: 755 INJECTION, SOLUTION INTRAVENOUS at 13:45

## 2021-02-01 RX ADMIN — SODIUM CHLORIDE 500 ML: 9 INJECTION, SOLUTION INTRAVENOUS at 12:10

## 2021-02-01 RX ADMIN — Medication 10 ML: at 13:45

## 2021-02-01 RX ADMIN — PANTOPRAZOLE SODIUM 40 MG: 40 INJECTION, POWDER, FOR SOLUTION INTRAVENOUS at 13:22

## 2021-02-01 RX ADMIN — SODIUM CHLORIDE: 9 INJECTION, SOLUTION INTRAVENOUS at 23:08

## 2021-02-01 NOTE — ED NOTES
Rectal exam performed by ER Resident. Stool positive for bright red blood.      Alejandro Dennis RN  02/01/21 3705

## 2021-02-01 NOTE — ED PROVIDER NOTES
The 8-year-old man with history of choledocholithiasis, common bile duct stent. He is presenting to the emergency department for bright red blood in his stool last night and this morning. He states that he had a normal stool last night with a significant amount of bright red blood in his stool. This morning he had another normal stool with a small amount of red blood. He has a remote history of hemorrhoids, has had no abdominal surgeries. Has not had any nausea or vomiting, has a slight discomfort in his abdomen that is constant, unchanged, has been present for months. He takes Zofran daily for nausea prescribed his gastroenterologist who recently replaced a common bile duct stent last month. He had chills about a week ago has not had any recent fevers or chills. He has not had any lightheadedness or syncope, has been feeling somewhat weak since last night. He is not on any blood thinners. The history is provided by the patient, medical records and a relative. Review of Systems   Constitutional: Negative for diaphoresis, fatigue and fever. HENT: Negative for rhinorrhea. Eyes: Negative for visual disturbance. Respiratory: Negative for cough, chest tightness, shortness of breath and wheezing. Cardiovascular: Negative for chest pain. Gastrointestinal: Positive for abdominal pain and nausea. Negative for vomiting. Endocrine: Negative for polydipsia. Genitourinary: Negative for decreased urine volume and dysuria. Musculoskeletal: Negative for neck pain and neck stiffness. Skin: Negative for wound. Neurological: Negative for dizziness and numbness. Hematological: Does not bruise/bleed easily. Physical Exam  Vitals signs and nursing note reviewed. Constitutional:       Appearance: Normal appearance. He is obese. He is not diaphoretic. HENT:      Head: Normocephalic.       Right Ear: External ear normal.      Left Ear: External ear normal.      Nose: Nose normal. Eyes:      Extraocular Movements: Extraocular movements intact. Conjunctiva/sclera: Conjunctivae normal.      Pupils: Pupils are equal, round, and reactive to light. Neck:      Musculoskeletal: Normal range of motion and neck supple. No neck rigidity. Cardiovascular:      Rate and Rhythm: Normal rate and regular rhythm. Pulses: Normal pulses. Heart sounds: Normal heart sounds. Comments: Systolic ejection murmur  Pulmonary:      Effort: Pulmonary effort is normal. No respiratory distress. Breath sounds: Normal breath sounds. No wheezing or rales. Abdominal:      General: Abdomen is flat. There is no distension. Palpations: Abdomen is soft. Tenderness: There is no abdominal tenderness. There is no guarding or rebound. Genitourinary:     Rectum: Normal. Guaiac result positive. Comments: Bright red blood on rectal exam, no obvious external or internal hemorrhoids. hemeOccult positive  Musculoskeletal: Normal range of motion. General: No deformity. Right lower leg: No edema. Left lower leg: No edema. Lymphadenopathy:      Cervical: No cervical adenopathy. Skin:     General: Skin is warm and dry. Capillary Refill: Capillary refill takes less than 2 seconds. Neurological:      General: No focal deficit present. Mental Status: He is alert. Psychiatric:         Mood and Affect: Mood normal.         Behavior: Behavior normal.         Thought Content: Thought content normal.          Procedures     MDM  Number of Diagnoses or Management Options  Diagnosis management comments: This is a 8-year-old male presenting to the emergency department for bright red blood per rectum, last night and this morning. Has had some mild increase in generalized weakness, not on any anticoagulation. Afebrile, normotensive, not tachycardic. He has no significant abdominal tenderness.   Suspect diverticulosis versus diverticulitis versus internal hemorrhoids as source of lower GI bleeding. Will obtain lab work, imaging. ED Course as of Feb 01 1703   Mon Feb 01, 2021   1212 Reevaluated, resting in bed comfortably. Patient updated on results of labs, imaging, current plan. [RH]   1885 Reevaluated, patient and daughter updated on results of labs, imaging, current plan. He is resting in bed comfortably. He has no penile pain, genital exam was unremarkable. [RH]   46 Dr. Real Benson discussed case with Dr. Cherri Rivera, general surgery. [RH]   7140 Dr. Real Benson discussed case with Dr. Cleo Dewitt, he agreed to admit the patient with general surgery consultation. [RH]      ED Course User Index  [RH] [address],           ED Course as of Feb 01 1703   Mon Feb 01, 2021   1212 Reevaluated, resting in bed comfortably. Patient updated on results of labs, imaging, current plan. [RH]   8828 Reevaluated, patient and daughter updated on results of labs, imaging, current plan. He is resting in bed comfortably. He has no penile pain, genital exam was unremarkable. [RH]   46 Dr. Real Benson discussed case with Dr. Cherri Rivera, general surgery. [RH]   7818 Dr. Real Benson discussed case with Dr. Cleo Dewitt, he agreed to admit the patient with general surgery consultation. [RH]      ED Course User Index  [RH] [address], DO       --------------------------------------------- PAST HISTORY ---------------------------------------------  Past Medical History:  has a past medical history of Anemia, Aortic stenosis, Arthritis, Cm's esophagus with esophagitis, Cancer (HCC), Cellulitis, Dizziness, Easy bruising, GERD (gastroesophageal reflux disease), H/O emphysema, Heart murmur, Heartburn, History of stress test, Lightheadedness, Macular degeneration, Passed out, Stroke [facility]), and Unspecified cerebral artery occlusion with cerebral infarction.     Past Surgical History:  has a past surgical history that includes fracture surgery; Hemorrhoid surgery; Tonsillectomy and adenoidectomy; Cataract removal; Upper gastrointestinal endoscopy; ERCP (N/A, 3/11/2020); ERCP (3/11/2020); joint replacement; ERCP (N/A, 12/11/2020); and ERCP (N/A, 12/11/2020). Social History:  reports that he has quit smoking. He has never used smokeless tobacco. He reports that he does not drink alcohol or use drugs. Family History: family history includes Heart Attack in his brother; Heart Disease in his brother, father, and mother. The patients home medications have been reviewed. Allergies: Patient has no known allergies.     -------------------------------------------------- RESULTS -------------------------------------------------    LABS:  Results for orders placed or performed during the hospital encounter of 02/01/21   CBC Auto Differential   Result Value Ref Range    WBC 6.2 4.5 - 11.5 E9/L    RBC 3.73 (L) 3.80 - 5.80 E12/L    Hemoglobin 11.5 (L) 12.5 - 16.5 g/dL    Hematocrit 35.4 (L) 37.0 - 54.0 %    MCV 94.9 80.0 - 99.9 fL    MCH 30.8 26.0 - 35.0 pg    MCHC 32.5 32.0 - 34.5 %    RDW 15.6 (H) 11.5 - 15.0 fL    Platelets 596 358 - 626 E9/L    MPV 9.9 7.0 - 12.0 fL    Neutrophils % 63.1 43.0 - 80.0 %    Immature Granulocytes % 1.1 0.0 - 5.0 %    Lymphocytes % 19.0 (L) 20.0 - 42.0 %    Monocytes % 10.6 2.0 - 12.0 %    Eosinophils % 5.2 0.0 - 6.0 %    Basophils % 1.0 0.0 - 2.0 %    Neutrophils Absolute 3.89 1.80 - 7.30 E9/L    Immature Granulocytes # 0.07 E9/L    Lymphocytes Absolute 1.17 (L) 1.50 - 4.00 E9/L    Monocytes Absolute 0.65 0.10 - 0.95 E9/L    Eosinophils Absolute 0.32 0.05 - 0.50 E9/L    Basophils Absolute 0.06 0.00 - 0.20 E9/L   Lipase   Result Value Ref Range    Lipase 28 13 - 60 U/L   Lactate, Sepsis   Result Value Ref Range    Lactic Acid, Sepsis 2.9 (H) 0.5 - 1.9 mmol/L   SPECIMEN REJECTION   Result Value Ref Range    Rejected Test cmpx     Reason for Rejection see below    Comprehensive Metabolic Panel w/ Reflex to MG   Result Value Ref Range    Sodium 145 132 - 146 mmol/L    Potassium reflex Magnesium 4.2 3.5 - 5.0 mmol/L    Chloride 113 (H) 98 - 107 mmol/L    CO2 23 22 - 29 mmol/L    Anion Gap 9 7 - 16 mmol/L    Glucose 120 (H) 74 - 99 mg/dL    BUN 18 8 - 23 mg/dL    CREATININE 0.9 0.7 - 1.2 mg/dL    GFR Non-African American >60 >=60 mL/min/1.73    GFR African American >60     Calcium 8.2 (L) 8.6 - 10.2 mg/dL    Total Protein 5.8 (L) 6.4 - 8.3 g/dL    Albumin 3.1 (L) 3.5 - 5.2 g/dL    Total Bilirubin 2.9 (H) 0.0 - 1.2 mg/dL    Alkaline Phosphatase 472 (H) 40 - 129 U/L    ALT 69 (H) 0 - 40 U/L    AST 68 (H) 0 - 39 U/L   Troponin   Result Value Ref Range    Troponin <0.01 0.00 - 0.03 ng/mL   Brain Natriuretic Peptide   Result Value Ref Range    Pro-BNP 1,097 (H) 0 - 450 pg/mL   Lipase   Result Value Ref Range    Lipase 24 13 - 60 U/L   TYPE AND SCREEN   Result Value Ref Range    ABO/Rh A POS     Antibody Screen NEG        RADIOLOGY:  CT ABDOMEN PELVIS W IV CONTRAST Additional Contrast? None   Final Result   Mild-to-moderate intrahepatic biliary ductal dilatation suggest occluded   metallic common bile duct stent. No change in multiple bladder diverticula. Moderate to large hiatal hernia is unchanged. Hyperattenuation is seen in the partially visualized left corpus cavernosum   is nonspecific. Finding may represent hemorrhage versus contrast staining. Please correlate with physical exam for pain. Further evaluation may be   obtained with penile ultrasound if clinically warranted. EKG: This EKG is signed and interpreted by me. Rate: 64  Rhythm: Sinus  Interpretation: Sinus rhythm, first-degree AV block, normal axis, AR is 242, QRS is 88, QTc is 418.   Nonspecific T changes that appears generally stable compared to prior  Comparison: stable as compared to patient's most recent EKG 12/11/20      ------------------------- NURSING NOTES AND VITALS REVIEWED ---------------------------  Date / Time Roomed:  2/1/2021  9:41 AM  ED Bed Assignment:  01/01    The nursing notes within the ED encounter and vital signs as below have been reviewed. Patient Vitals for the past 24 hrs:   BP Temp Pulse Resp SpO2   02/01/21 1430 114/63 -- 66 18 97 %   02/01/21 1210 139/76 -- 67 15 95 %   02/01/21 1131 (!) 118/59 -- 60 16 95 %   02/01/21 0940 113/68 -- 79 14 99 %   02/01/21 0933 -- 97.2 °F (36.2 °C) 98 -- --       Oxygen Saturation Interpretation: Normal    ------------------------------------------ PROGRESS NOTES ------------------------------------------  Re-evaluation(s):    Patients symptoms show no change  Repeat physical examination is not changed    Counseling:  I have spoken with the patient and discussed todays results, in addition to providing specific details for the plan of care and counseling regarding the diagnosis and prognosis. Their questions are answered at this time and they are agreeable with the plan of admission.    --------------------------------- ADDITIONAL PROVIDER NOTES ---------------------------------     This patient's ED course included: a personal history and physicial examination, multiple bedside re-evaluations, IV medications, cardiac monitoring, continuous pulse oximetry and complex medical decision making and emergency management    This patient has remained hemodynamically stable during their ED course. Diagnosis:  1. Bright red rectal bleeding    2. Elevated lactic acid level    3. Common bile duct obstruction secondary to biliary stent    4. Biliary obstruction    5. Generalized weakness        Disposition:  Patient's disposition: Admit   Patient's condition is stable. ATTENDING PROVIDER ATTESTATION:     Kayden Edward presented to the emergency department for evaluation of Rectal Bleeding (Pt stated that he's had some blood in his stool 2 episodes one last night and one this morning.   Pt stated he feels weak. )    I have reviewed and discussed the case, including pertinent history (medical, surgical, family and social) and exam findings with the Resident and the Nurse assigned to Katiuska Burk. I have personally performed and/or participated in the history, exam, medical decision making, and procedures and agree with all pertinent clinical information. I have reviewed my findings and recommendations with Katiuska Burk and members of family present at the time of disposition. Vitals:    02/01/21 1430   BP: 114/63   Pulse: 66   Resp: 18   Temp:    SpO2: 97%         Oxygen Saturation Interpretation: Normal    The cardiac monitor revealed NSR with a heart rate in the 70s as interpreted by me. The cardiac monitor was ordered secondary to the patient's heart rate and to monitor the patient for dysrhythmia. CPT 46905    The patients available past medical records and past encounters were reviewed. MDM:     I, Dr. Jacob Bertrand am the primary provider of record    Patient presents with rectal bleeding bright red blood per rectum. Recent biliary stenting procedure. Work-up undertaken, bilirubin has elevated, spoke with his surgeon, they will follow, spoke with medicine patient be admitted for further care    My findings/plan: The primary encounter diagnosis was Bright red rectal bleeding. Diagnoses of Elevated lactic acid level, Common bile duct obstruction secondary to biliary stent, Biliary obstruction, and Generalized weakness were also pertinent to this visit.   New Prescriptions    No medications on file     DO Andrea Wood, DO  02/01/21 Luis 939 Jacob Bertrand, DO  02/17/21 [id number]

## 2021-02-01 NOTE — CONSULTS
GENERAL SURGERY  CONSULT NOTE  2/1/2021    Physician Consulted: Dr. Ofelia Sullivan   Reason for Consult: Hyperbilirubinemia  Referring Physician: Dr. Josh Szymanski     DERRELL Bain is a 80 y.o. male who presents for evaluation of bright red blood per rectum and hyperbilirubinemia. PMH of cholecystitis and choledocholithiasis, recently underwent a stent exchange with metal stent and stone extraction on 12/11. Patient presented today after noticing blood in his stool last night and one time this morning. He states that his stool was normal brown in color with little specks of blood. His current hgb is 11.5 which is down from his last hgb, but is around his baseline. He denies any lightheadedness or syncope. On labs he was found to have hyperbilirubinemia. CT scan is suggestive for occlusion of the common bile duct post metal stent placement. Patient denies any nausea or vomiting. He denies any right upper quadrant tenderness or pain. He takes Zofran with every meal to help with his chronic nausea. Past Medical History:   Diagnosis Date    Anemia     Aortic stenosis     Arthritis     Cm's esophagus with esophagitis     Scope every 2 years with Dr. Andrews Speaker.     Cancer (HCC)     melanoma x3    Cellulitis     Dizziness     Easy bruising     GERD (gastroesophageal reflux disease)     H/O emphysema     Heart murmur     Heartburn     History of stress test     Lightheadedness     Macular degeneration     Passed out     Stroke [facility])     Unspecified cerebral artery occlusion with cerebral infarction        Past Surgical History:   Procedure Laterality Date    CATARACT REMOVAL      ERCP N/A 3/11/2020    ERCP STONE REMOVAL performed by Benson Mendoza MD at [address] ERCP  3/11/2020    ERCP SPHINCTER/PAPILLOTOMY performed by Benson Mendoza MD at [address] ERCP N/A 12/11/2020    ERCP STENT REMOVAL/EXCHANGE performed by Benson Mendoza MD at [address] ERCP N/A 12/11/2020 ERCP STONE REMOVAL performed by Genie Escalera MD at [place] [address]      left hip    TONSILLECTOMY AND ADENOIDECTOMY      UPPER GASTROINTESTINAL ENDOSCOPY         Medications Prior to Admission:    Prior to Admission medications    Medication Sig Start Date End Date Taking? Authorizing Provider   ondansetron (ZOFRAN) 4 MG tablet Take 1 tablet by mouth 3 times daily as needed for Nausea or Vomiting 12/28/20   Genie Escalera MD   vitamin E 1000 units capsule Take 1,000 Units by mouth daily    Historical Provider, MD   bevacizumab (AVASTIN) 2.5 mg/0.1 ml syringe Inject 2.5 mg into the eye once Every 6 weeks in the left eye    Historical Provider, MD   omeprazole (PRILOSEC) 20 MG capsule Take 20 mg by mouth Daily     Historical Provider, MD   pravastatin (PRAVACHOL) 40 MG tablet Take 40 mg by mouth nightly    Historical Provider, MD   Multiple Vitamins-Minerals (MULTIVITAMIN PO) Take 1 tablet by mouth daily     Historical Provider, MD   Multiple Vitamins-Minerals (PRESERVISION AREDS) CAPS Take 1 capsule by mouth daily    Historical Provider, MD   carboxymethylcellulose (REFRESH CELLUVISC) 1 % ophthalmic solution Place 1 drop into both eyes 3 times daily    Historical Provider, MD   Vitamin D (CHOLECALCIFEROL) 1000 UNITS CAPS capsule Take 1,000 Units by mouth 2 times daily    Historical Provider, MD       No Known Allergies    Family History   Problem Relation Age of Onset    Heart Disease Mother     Heart Disease Father     Heart Disease Brother     Heart Attack Brother        Social History     Tobacco Use    Smoking status: Former Smoker    Smokeless tobacco: Never Used    Tobacco comment: quit smoking cigars a very long time ago   Substance Use Topics    Alcohol use: No    Drug use: No         Review of Systems   General ROS: negative  Hematological and Lymphatic ROS: Noticed a small amount of blood on stool this a.m.   Respiratory ROS: no cough, shortness of breath, or wheezing  Cardiovascular ROS: no chest pain or dyspnea on exertion  Gastrointestinal ROS: no abdominal pain, change in bowel habits, noticed some blood in his stool this morning  Genito-Urinary ROS: no dysuria, trouble voiding, or hematuria  Musculoskeletal ROS: negative      PHYSICAL EXAM:    Vitals:    02/01/21 1430   BP: 114/63   Pulse: 66   Resp: 18   Temp:    SpO2: 97%       General Appearance:  awake, alert, oriented, in no acute distress  Skin:  Skin color, texture, turgor normal. No rashes or lesions. Head/face:  NCAT  Eyes:  PERRL  Lungs:  No chest wall tenderness. Heart:  Heart regular rate and rhythm  Abdomen:  Soft, non-tender, normal bowel sounds. No bruits, organomegaly or masses. Extremities: pulses present in all extremities  Male Rectal:  Normal male: no hemorrhoids, FOBT positive, dark brown stool on finger    LABS:    CBC  Recent Labs     02/01/21  1025   WBC 6.2   HGB 11.5*   HCT 35.4*        BMP  Recent Labs     02/01/21  1233      K 4.2   *   CO2 23   BUN 18   CREATININE 0.9   CALCIUM 8.2*     Liver Function  Recent Labs     02/01/21  1233   LIPASE 24   BILITOT 2.9*   AST 68*   ALT 69*   ALKPHOS 472*   PROT 5.8*   LABALBU 3.1*     No results for input(s): LACTATE in the last 72 hours. No results for input(s): INR, PTT in the last 72 hours. Invalid input(s): PT    RADIOLOGY    Ct Abdomen Pelvis W Iv Contrast Additional Contrast? None    Result Date: 2/1/2021  EXAMINATION: CT OF THE ABDOMEN AND PELVIS WITH CONTRAST 2/1/2021 1:42 pm TECHNIQUE: CT of the abdomen and pelvis was performed with the administration of intravenous contrast. Multiplanar reformatted images are provided for review. Dose modulation, iterative reconstruction, and/or weight based adjustment of the mA/kV was utilized to reduce the radiation dose to as low as reasonably achievable.  COMPARISON: CT abdomen pelvis December 15, 2020 and March 10, 2020 HISTORY: ORDERING SYSTEM PROVIDED HISTORY: GI bleeding, recent biliary stent placed TECHNOLOGIST PROVIDED HISTORY: Reason for exam:->GI bleeding, recent biliary stent placed Additional Contrast?->None Decision Support Exception->Emergency Medical Condition (MA) What reading provider will be dictating this exam?->CRC FINDINGS: Lung Bases: Compressive atelectasis seen in the posterior lung bases. Liver: No hepatic lesions identified. Bile ducts: Mild diffuse intrahepatic biliary ductal dilatation. Common bile duct stent identified. Hydropic gallbladder with sludge. No pericholecystic fat stranding. Pancreas: No pancreatic lesions. The pancreatic duct is not dilated. Adrenal glands: Normal in appearance. Kidneys: No evidence of hydronephrosis. No abnormal renal lesions. Spleen: No enhancing lesions. Bowel: Moderate to large hiatal hernia, unchanged. No evidence of bowel obstruction. Mildly prominent appendix, unchanged compared to March 2020. Peritoneum/Retroperitoneum: No abnormal pelvic lymphadenopathy. Pelvis: The prostate is enlarged measuring 5 cm in transverse axis. Multiple diverticula identified in the anterior and left posterior aspect of the bladder. Circumferential hyperattenuation around the left corpus cavernosum. Bones/Soft tissues: No aggressive osseous lesions. Mild-to-moderate intrahepatic biliary ductal dilatation suggest occluded metallic common bile duct stent. No change in multiple bladder diverticula. Moderate to large hiatal hernia is unchanged. Hyperattenuation is seen in the partially visualized left corpus cavernosum is nonspecific. Finding may represent hemorrhage versus contrast staining. Please correlate with physical exam for pain. Further evaluation may be obtained with penile ultrasound if clinically warranted.         ASSESSMENT:  80 y.o. male with bright red blood per rectum, hyperbilirubinemia    PLAN:  Admit to medicine  Overall care per primary  We will get a hepatic function panel  N.p.o. at Results   Component Value Date    CREATININE 0.9 02/02/2021    BUN 15 02/02/2021     02/02/2021    K 4.0 02/02/2021     (H) 02/02/2021    CO2 21 (L) 02/02/2021     Recent Labs     02/01/21  1025 02/01/21  1233   LIPASE 28 24     CBC with Differential:    Lab Results   Component Value Date    WBC 6.2 02/01/2021    RBC 3.73 02/01/2021    HGB 11.1 02/01/2021    HCT 34.4 02/01/2021     02/01/2021    MCV 94.9 02/01/2021    MCH 30.8 02/01/2021    MCHC 32.5 02/01/2021    RDW 15.6 02/01/2021    SEGSPCT 42 08/03/2011    LYMPHOPCT 19.0 02/01/2021    MONOPCT 10.6 02/01/2021    BASOPCT 1.0 02/01/2021    MONOSABS 0.65 02/01/2021    LYMPHSABS 1.17 02/01/2021    EOSABS 0.32 02/01/2021    BASOSABS 0.06 02/01/2021     CMP:    Lab Results   Component Value Date     02/02/2021    K 4.0 02/02/2021    K 4.2 02/01/2021     02/02/2021    CO2 21 02/02/2021    BUN 15 02/02/2021    CREATININE 0.9 02/02/2021    GFRAA >60 02/02/2021    LABGLOM >60 02/02/2021    GLUCOSE 78 02/02/2021    GLUCOSE 90 08/03/2011    PROT 5.6 02/02/2021    LABALBU 2.8 02/02/2021    LABALBU 4.3 08/03/2011    CALCIUM 7.7 02/02/2021    BILITOT 2.8 02/02/2021    ALKPHOS 419 02/02/2021    AST 59 02/02/2021    ALT 62 02/02/2021       General appearance:  NAD  Head: NCAT, PERRLA, EOMI, red conjunctiva  Neck: supple, no masses  Lungs: CTAB, equal chest rise bilateral  Heart: Reg rate  Abdomen: soft, nondistended, mild epigastric tenderness  Skin; no lesions  Gu: no cva tenderness  Extremities: extremities normal, atraumatic, no cyanosis or edema      Assessment/Plan:  Ming Jaime is a 80 y.o. male hyperbilirubinemia, history of choledocholithiasis status post ERCP with stent placement    Antibiotics for cholangitis prophylaxis  We will plan ERCP with stent exchange 2/3/2021   N.p.o. after midnight    Genie Escalera MD  2/2/2021  10:35 AM

## 2021-02-02 ENCOUNTER — ANESTHESIA EVENT (OUTPATIENT)
Dept: ENDOSCOPY | Age: 86
DRG: 908 | End: 2021-02-02
Payer: MEDICARE

## 2021-02-02 ENCOUNTER — ANESTHESIA (OUTPATIENT)
Dept: ENDOSCOPY | Age: 86
DRG: 908 | End: 2021-02-02
Payer: MEDICARE

## 2021-02-02 LAB
ALBUMIN SERPL-MCNC: 2.8 G/DL (ref 3.5–5.2)
ALP BLD-CCNC: 419 U/L (ref 40–129)
ALT SERPL-CCNC: 62 U/L (ref 0–40)
ANION GAP SERPL CALCULATED.3IONS-SCNC: 8 MMOL/L (ref 7–16)
AST SERPL-CCNC: 59 U/L (ref 0–39)
BILIRUB SERPL-MCNC: 2.8 MG/DL (ref 0–1.2)
BUN BLDV-MCNC: 15 MG/DL (ref 8–23)
CALCIUM SERPL-MCNC: 7.7 MG/DL (ref 8.6–10.2)
CHLORIDE BLD-SCNC: 112 MMOL/L (ref 98–107)
CO2: 21 MMOL/L (ref 22–29)
CREAT SERPL-MCNC: 0.9 MG/DL (ref 0.7–1.2)
GFR AFRICAN AMERICAN: >60
GFR NON-AFRICAN AMERICAN: >60 ML/MIN/1.73
GLUCOSE BLD-MCNC: 78 MG/DL (ref 74–99)
METER GLUCOSE: 82 MG/DL (ref 74–99)
POTASSIUM SERPL-SCNC: 4 MMOL/L (ref 3.5–5)
SODIUM BLD-SCNC: 141 MMOL/L (ref 132–146)
TOTAL PROTEIN: 5.6 G/DL (ref 6.4–8.3)

## 2021-02-02 PROCEDURE — 2580000003 HC RX 258: Performed by: INTERNAL MEDICINE

## 2021-02-02 PROCEDURE — 2060000000 HC ICU INTERMEDIATE R&B

## 2021-02-02 PROCEDURE — 6360000002 HC RX W HCPCS: Performed by: INTERNAL MEDICINE

## 2021-02-02 PROCEDURE — C9113 INJ PANTOPRAZOLE SODIUM, VIA: HCPCS | Performed by: INTERNAL MEDICINE

## 2021-02-02 PROCEDURE — 6370000000 HC RX 637 (ALT 250 FOR IP): Performed by: INTERNAL MEDICINE

## 2021-02-02 PROCEDURE — 82962 GLUCOSE BLOOD TEST: CPT

## 2021-02-02 PROCEDURE — 36415 COLL VENOUS BLD VENIPUNCTURE: CPT

## 2021-02-02 PROCEDURE — 80053 COMPREHEN METABOLIC PANEL: CPT

## 2021-02-02 RX ADMIN — POLYVINYL ALCOHOL 1 DROP: 14 SOLUTION/ DROPS OPHTHALMIC at 14:26

## 2021-02-02 RX ADMIN — SODIUM CHLORIDE, PRESERVATIVE FREE 10 ML: 5 INJECTION INTRAVENOUS at 08:41

## 2021-02-02 RX ADMIN — POLYVINYL ALCOHOL 1 DROP: 14 SOLUTION/ DROPS OPHTHALMIC at 20:51

## 2021-02-02 RX ADMIN — POLYVINYL ALCOHOL 1 DROP: 14 SOLUTION/ DROPS OPHTHALMIC at 08:42

## 2021-02-02 RX ADMIN — SODIUM CHLORIDE: 9 INJECTION, SOLUTION INTRAVENOUS at 07:38

## 2021-02-02 RX ADMIN — PANTOPRAZOLE SODIUM 40 MG: 40 INJECTION, POWDER, FOR SOLUTION INTRAVENOUS at 08:41

## 2021-02-02 ASSESSMENT — PAIN SCALES - GENERAL
PAINLEVEL_OUTOF10: 0
PAINLEVEL_OUTOF10: 0

## 2021-02-02 NOTE — H&P
[facility]  History and Physical      CHIEF COMPLAINT: Rectal bleeding    Reason for Admission: Rectal bleeding, obstructive jaundice    History Obtained From: Patient, daughter    PCP :  Iris Hays MD    [address]  [facility] [id number]      HISTORY OF PRESENT ILLNESS:      The patient is a 80 y.o. male presented initially to the emergency with a chief complaint of rectal bleeding. Patient then presented to the emergency room. Patient does have a recent history of common bile duct stent. In the emergency room patient was noted to have elevated liver function test.  Daughter reports that he had some chills. At the time of questioning he feels okay. Patient was then admitted for further evaluation treatment. Since admission hemoglobin has been stable. Past Medical History:        Diagnosis Date    Anemia     Aortic stenosis     Arthritis     Cm's esophagus with esophagitis     Scope every 2 years with Dr. Ashly Bell.     Cancer (HCC)     melanoma x3    Cellulitis     Dizziness     Easy bruising     GERD (gastroesophageal reflux disease)     H/O emphysema     Heart murmur     Heartburn     History of stress test     Lightheadedness     Macular degeneration     Passed out     Stroke [facility])     Unspecified cerebral artery occlusion with cerebral infarction      Past Surgical History:        Procedure Laterality Date    CATARACT REMOVAL      ERCP N/A 3/11/2020    ERCP STONE REMOVAL performed by Aurelia Bond MD at [address] ERCP  3/11/2020    ERCP SPHINCTER/PAPILLOTOMY performed by Aurelia Bond MD at [address] ERCP N/A 12/11/2020    ERCP STENT REMOVAL/EXCHANGE performed by Aurelia Bond MD at [address] [place] [address],[facility] ERCP N/A 12/11/2020    ERCP STONE REMOVAL performed by Aurelia Bond MD at [place] [address]      left hip    TONSILLECTOMY AND ADENOIDECTOMY      UPPER GASTROINTESTINAL ENDOSCOPY           Medications Prior to Admission:    Medications Prior to Admission: ondansetron (ZOFRAN) 4 MG tablet, Take 1 tablet by mouth 3 times daily as needed for Nausea or Vomiting  vitamin E 1000 units capsule, Take 1,000 Units by mouth daily  bevacizumab (AVASTIN) 2.5 mg/0.1 ml syringe, Inject 2.5 mg into the eye once Every 6 weeks in the left eye  omeprazole (PRILOSEC) 20 MG capsule, Take 40 mg by mouth Daily   pravastatin (PRAVACHOL) 40 MG tablet, Take 40 mg by mouth nightly  Multiple Vitamins-Minerals (MULTIVITAMIN PO), Take 1 tablet by mouth daily   Multiple Vitamins-Minerals (PRESERVISION AREDS) CAPS, Take 1 capsule by mouth daily  carboxymethylcellulose (REFRESH CELLUVISC) 1 % ophthalmic solution, Place 1 drop into both eyes 3 times daily  Vitamin D (CHOLECALCIFEROL) 1000 UNITS CAPS capsule, Take 1,000 Units by mouth 2 times daily    Allergies:  Patient has no known allergies.     Social History:   Social History     Socioeconomic History    Marital status:      Spouse name: linn    Number of children: 3    Years of education: 6    Highest education level: Not on file   Occupational History    Not on file   Social Needs    Financial resource strain: Not on file    Food insecurity     Worry: Not on file     Inability: Not on file   [employer] needs     Medical: Not on file     Non-medical: Not on file   Tobacco Use    Smoking status: Former Smoker    Smokeless tobacco: Never Used    Tobacco comment: quit smoking cigars a very long time ago   Substance and Sexual Activity    Alcohol use: No    Drug use: No    Sexual activity: Not on file   Lifestyle    Physical activity     Days per week: Not on file     Minutes per session: Not on file    Stress: Not on file   Relationships    Social connections     Talks on phone: Not on file     Gets together: Not on file     Attends Latter-day service: Not on file     Active member of club or organization: Not on file     Attends meetings of clubs or organizations: Not on file     Relationship status: Not on file    Intimate partner violence     Fear of current or ex partner: Not on file     Emotionally abused: Not on file     Physically abused: Not on file     Forced sexual activity: Not on file   Other Topics Concern    Not on file   Social History Narrative    Not on file         Family History:       Problem Relation Age of Onset    Heart Disease Mother     Heart Disease Father     Heart Disease Brother     Heart Attack Brother        REVIEW OF SYSTEMS:    General ROS: negative  Hematological and Lymphatic ROS: negative  Endocrine ROS: negative  Respiratory ROS: no cough,  wheezing  or shortness of breath,   Cardiovascular ROS: no chest pain or dyspnea on exertion  Gastrointestinal ROS: Rectal bleeding as mentioned above Genito-Urinary ROS: no dysuria, trouble voiding, or hematuria  Neurological ROS: no TIA or stroke symptoms  negative    Vitals:  /66   Pulse 65   Temp 97.7 °F (36.5 °C) (Oral)   Resp 16   Ht 5' 6\" (1.676 m)   SpO2 97%   BMI 23.24 kg/m²     PHYSICAL EXAM:  General:  Awake, alert, oriented X 3. Well developed, well nourished, well groomed. No apparent distress. HEENT:  Normocephalic, atraumatic. Pupils equal, round, reactive to light. No scleral icterus. No conjunctival injection. Neck:  Supple, no carotid bruits  Heart:  RRR,   Lungs:  CTA bilaterally, bilat symmetrical expansion, no wheeze, rales, or rhonchi  Abdomen:   Bowel sounds present, soft, nontender, no masses, no organomegaly, no peritoneal signs  Extremities:  No clubbing, cyanosis, or edema  Skin:  Warm and dry, no open lesions or rash  Neuro:  Cranial nerves 2-12 intact, no focal deficits      DATA:     Recent Results (from the past 24 hour(s))   Lactate, Sepsis    Collection Time: 02/01/21  5:47 PM   Result Value Ref Range    Lactic Acid, Sepsis 0.7 0.5 - 1.9 mmol/L   Hepatic function panel    Collection Time: 02/01/21  5:47 PM   Result Value Ref Range    Total Protein 6.0 (L) 6.4 - 8.3 g/dL    Albumin 3.1 (L) 3.5 - 5.2 g/dL    Alkaline Phosphatase 495 (H) 40 - 129 U/L    ALT 73 (H) 0 - 40 U/L    AST 75 (H) 0 - 39 U/L    Total Bilirubin 3.6 (H) 0.0 - 1.2 mg/dL    Bilirubin, Direct 2.7 (H) 0.0 - 0.3 mg/dL    Bilirubin, Indirect 0.9 0.0 - 1.0 mg/dL   Urinalysis with Microscopic    Collection Time: 02/01/21  6:20 PM   Result Value Ref Range    Color, UA Yellow Straw/Yellow    Clarity, UA Clear Clear    Glucose, Ur Negative Negative mg/dL    Bilirubin Urine SMALL (A) Negative    Ketones, Urine Negative Negative mg/dL    Specific Gravity, UA 1.015 1.005 - 1.030    Blood, Urine Negative Negative    pH, UA 5.0 5.0 - 9.0    Protein, UA Negative Negative mg/dL    Urobilinogen, Urine 1.0 <2.0 E.U./dL    Nitrite, Urine Negative Negative    Leukocyte Esterase, Urine Negative Negative    WBC, UA NONE 0 - 5 /HPF    RBC, UA NONE 0 - 2 /HPF    Bacteria, UA NONE SEEN None Seen /HPF   Hemoglobin and hematocrit, blood    Collection Time: 02/01/21 11:05 PM   Result Value Ref Range    Hemoglobin 11.1 (L) 12.5 - 16.5 g/dL    Hematocrit 34.4 (L) 37.0 - 54.0 %   Comprehensive Metabolic Panel    Collection Time: 02/02/21  5:01 AM   Result Value Ref Range    Sodium 141 132 - 146 mmol/L    Potassium 4.0 3.5 - 5.0 mmol/L    Chloride 112 (H) 98 - 107 mmol/L    CO2 21 (L) 22 - 29 mmol/L    Anion Gap 8 7 - 16 mmol/L    Glucose 78 74 - 99 mg/dL    BUN 15 8 - 23 mg/dL    CREATININE 0.9 0.7 - 1.2 mg/dL    GFR Non-African American >60 >=60 mL/min/1.73    GFR African American >60     Calcium 7.7 (L) 8.6 - 10.2 mg/dL    Total Protein 5.6 (L) 6.4 - 8.3 g/dL    Albumin 2.8 (L) 3.5 - 5.2 g/dL    Total Bilirubin 2.8 (H) 0.0 - 1.2 mg/dL    Alkaline Phosphatase 419 (H) 40 - 129 U/L    ALT 62 (H) 0 - 40 U/L    AST 59 (H) 0 - 39 U/L   POCT Glucose    Collection Time: 02/02/21 11:56 AM   Result Value Ref Range    Meter Glucose 82 74 - 99 mg/dL       CT ABDOMEN PELVIS

## 2021-02-02 NOTE — PROGRESS NOTES
Comprehensive Nutrition Assessment    Type and Reason for Visit:  Initial, Positive Nutrition Screen    Nutrition Recommendations/Plan: Continue NPO  Pending ERCP, recommend to advance nutrition as medically appropriate. Recommend ONS w/ diet progression. Nutrition Assessment:  Pt admit 2/2 acute GIB pending ERCP remains NPO. H/o cholecystitis s/p stent placement x1 mo PTA. Malnutrition Assessment:  Malnutrition Status:  Insufficient data    Context:  Chronic Illness     Findings of the 6 clinical characteristics of malnutrition:  Energy Intake:  Mild decrease in energy intake (Comment)  Weight Loss:  No significant weight loss(2.7% x9 mo)     Body Fat Loss:  Unable to assess     Muscle Mass Loss:  Unable to assess    Fluid Accumulation:  No significant fluid accumulation     Strength:  Not Performed    Estimated Daily Nutrient Needs:  Energy (kcal):  MSJ 1201 x1.2= 1441; ; Weight Used for Energy Requirements:  Current     Protein (g):  80-90(1.2-1.4gm/kg CBW); Weight Used for Protein Requirements:  Current          Nutrition Related Findings:  hypoactive BS, soft abd, bili 2.8, weakness, no noted edema      Wounds:  None       Current Nutrition Therapies:    DIET LOW FAT;   Diet NPO, After Midnight    Anthropometric Measures:  · Height: 5' 6\" (167.6 cm)  · Current Body Weight: 144 lb (65.3 kg)(12/11/20 actual; entered as estimated upon admit- GITA updated wt)   · Usual Body Weight: 148 lb (67.1 kg)(03/20 per EMR, actual)     · Ideal Body Weight: 142 lbs; % Ideal Body Weight 101.4 %   · BMI: 23.3   · BMI Categories: Normal Weight (BMI 22.0 to 24.9) age over 72       Nutrition Diagnosis:   · Inadequate oral intake related to altered GI function as evidenced by NPO or clear liquid status due to medical condition    Nutrition Interventions:   Nutrition Education/Counseling:  Education not indicated   Coordination of Nutrition Care:  Continue to monitor while inpatient    Goals:  ADAT Nutrition Monitoring and Evaluation:   Behavioral-Environmental Outcomes:      Food/Nutrient Intake Outcomes:  Diet Advancement/Tolerance  Physical Signs/Symptoms Outcomes:  GI Status, Weight, Skin, Nutrition Focused Physical Findings, Biochemical Data, Fluid Status or Edema     Electronically signed by Elana Truong MS, RD, LD on 2/2/21 at 10:55 AM EST

## 2021-02-02 NOTE — PLAN OF CARE
Problem: Skin Integrity:  Goal: Will show no infection signs and symptoms  Description: Will show no infection signs and symptoms  2/2/2021 1101 by Kiran Alcocer RN  Outcome: Met This Shift  2/2/2021 0610 by Telma Duran RN  Outcome: Met This Shift     Problem: Bleeding:  Goal: Will show no signs and symptoms of excessive bleeding  Description: Will show no signs and symptoms of excessive bleeding  2/2/2021 0610 by Telma Duran RN  Outcome: Met This Shift

## 2021-02-02 NOTE — CARE COORDINATION
Met with patient at bedside to discuss transition of care. Pt lives in a 1 story home with his wife. His house has grab bars throughout entire house and pt's shower is a sit down shower with grab bars. There is only 1 step down to enter in the the kitchen. There are 8 steps down to enter the pt's basement. Pt has a rollator, cane and walker. PCP is Dr. Tuan Byrne. Pharmacy is [facility] [address]. Hx at [facility] and hx with [facility]. Awaiting therapy evals to determine if home is safest discharge plan. Sticky note left for  to obtain orders.  Pt scheduled for ERCP today, will follow post procedure    Gwen RENTERIAN, RN  [facility]  [phone]

## 2021-02-02 NOTE — PLAN OF CARE
Problem: Skin Integrity:  Goal: Will show no infection signs and symptoms  Description: Will show no infection signs and symptoms  2/2/2021 0610 by Anay Morris RN  Outcome: Met This Shift     Problem: Bleeding:  Goal: Will show no signs and symptoms of excessive bleeding  Description: Will show no signs and symptoms of excessive bleeding  2/2/2021 0610 by Anay Morris RN  Outcome: Met This Shift

## 2021-02-03 ENCOUNTER — APPOINTMENT (OUTPATIENT)
Dept: GENERAL RADIOLOGY | Age: 86
DRG: 908 | End: 2021-02-03
Payer: MEDICARE

## 2021-02-03 VITALS — OXYGEN SATURATION: 86 % | DIASTOLIC BLOOD PRESSURE: 70 MMHG | SYSTOLIC BLOOD PRESSURE: 119 MMHG

## 2021-02-03 LAB
METER GLUCOSE: 105 MG/DL (ref 74–99)
PROCALCITONIN: 0.26 NG/ML (ref 0–0.08)

## 2021-02-03 PROCEDURE — 3609015200 HC ERCP REMOVE CALCULI/DEBRIS BILIARY/PANCREAS DUCT: Performed by: SURGERY

## 2021-02-03 PROCEDURE — 0FC98ZZ EXTIRPATION OF MATTER FROM COMMON BILE DUCT, VIA NATURAL OR ARTIFICIAL OPENING ENDOSCOPIC: ICD-10-PCS | Performed by: SURGERY

## 2021-02-03 PROCEDURE — 3209999900 FLUORO FOR SURGICAL PROCEDURES

## 2021-02-03 PROCEDURE — 2580000003 HC RX 258: Performed by: INTERNAL MEDICINE

## 2021-02-03 PROCEDURE — 6360000004 HC RX CONTRAST MEDICATION: Performed by: SURGERY

## 2021-02-03 PROCEDURE — C1769 GUIDE WIRE: HCPCS | Performed by: SURGERY

## 2021-02-03 PROCEDURE — 2580000003 HC RX 258: Performed by: SURGERY

## 2021-02-03 PROCEDURE — 3700000001 HC ADD 15 MINUTES (ANESTHESIA): Performed by: SURGERY

## 2021-02-03 PROCEDURE — 36415 COLL VENOUS BLD VENIPUNCTURE: CPT

## 2021-02-03 PROCEDURE — 2709999900 HC NON-CHARGEABLE SUPPLY: Performed by: SURGERY

## 2021-02-03 PROCEDURE — 2580000003 HC RX 258

## 2021-02-03 PROCEDURE — C1874 STENT, COATED/COV W/DEL SYS: HCPCS | Performed by: SURGERY

## 2021-02-03 PROCEDURE — C9113 INJ PANTOPRAZOLE SODIUM, VIA: HCPCS | Performed by: INTERNAL MEDICINE

## 2021-02-03 PROCEDURE — 87040 BLOOD CULTURE FOR BACTERIA: CPT

## 2021-02-03 PROCEDURE — 2060000000 HC ICU INTERMEDIATE R&B

## 2021-02-03 PROCEDURE — 7100000000 HC PACU RECOVERY - FIRST 15 MIN: Performed by: SURGERY

## 2021-02-03 PROCEDURE — 0F794DZ DILATION OF COMMON BILE DUCT WITH INTRALUMINAL DEVICE, PERCUTANEOUS ENDOSCOPIC APPROACH: ICD-10-PCS | Performed by: SURGERY

## 2021-02-03 PROCEDURE — 43276 ERCP STENT EXCHANGE W/DILATE: CPT | Performed by: SURGERY

## 2021-02-03 PROCEDURE — 6360000002 HC RX W HCPCS

## 2021-02-03 PROCEDURE — 82962 GLUCOSE BLOOD TEST: CPT

## 2021-02-03 PROCEDURE — 7100000001 HC PACU RECOVERY - ADDTL 15 MIN: Performed by: SURGERY

## 2021-02-03 PROCEDURE — 3609015000 HC ERCP REMOVE FOREIGN BODY/STENT BILIARY/PANC DUCT: Performed by: SURGERY

## 2021-02-03 PROCEDURE — 3609014300 HC ERCP BALLOON DILATE BILIARY/PANC DUCT/AMPULLA EA: Performed by: SURGERY

## 2021-02-03 PROCEDURE — 3700000000 HC ANESTHESIA ATTENDED CARE: Performed by: SURGERY

## 2021-02-03 PROCEDURE — 6360000002 HC RX W HCPCS: Performed by: INTERNAL MEDICINE

## 2021-02-03 PROCEDURE — 43264 ERCP REMOVE DUCT CALCULI: CPT | Performed by: SURGERY

## 2021-02-03 PROCEDURE — 0FPB4DZ REMOVAL OF INTRALUMINAL DEVICE FROM HEPATOBILIARY DUCT, PERCUTANEOUS ENDOSCOPIC APPROACH: ICD-10-PCS | Performed by: SURGERY

## 2021-02-03 PROCEDURE — 84145 PROCALCITONIN (PCT): CPT

## 2021-02-03 PROCEDURE — 2720000010 HC SURG SUPPLY STERILE: Performed by: SURGERY

## 2021-02-03 PROCEDURE — 3609014500 HC ERCP BILIARY/PANC DUCT STENT EXCHANGE W/DIL&WIRE: Performed by: SURGERY

## 2021-02-03 DEVICE — STENT SYSTEM RMV
Type: IMPLANTABLE DEVICE | Site: BILE DUCT | Status: FUNCTIONAL
Brand: WALLFLEX BILIARY

## 2021-02-03 RX ORDER — PROPOFOL 10 MG/ML
INJECTION, EMULSION INTRAVENOUS PRN
Status: DISCONTINUED | OUTPATIENT
Start: 2021-02-03 | End: 2021-02-03 | Stop reason: SDUPTHER

## 2021-02-03 RX ORDER — SODIUM CHLORIDE 9 MG/ML
INJECTION, SOLUTION INTRAVENOUS CONTINUOUS PRN
Status: DISCONTINUED | OUTPATIENT
Start: 2021-02-03 | End: 2021-02-03 | Stop reason: SDUPTHER

## 2021-02-03 RX ADMIN — POLYVINYL ALCOHOL 1 DROP: 14 SOLUTION/ DROPS OPHTHALMIC at 21:04

## 2021-02-03 RX ADMIN — SODIUM CHLORIDE: 0.9 INJECTION, SOLUTION INTRAVENOUS at 14:32

## 2021-02-03 RX ADMIN — PROPOFOL 300 MG: 10 INJECTION, EMULSION INTRAVENOUS at 14:40

## 2021-02-03 RX ADMIN — SODIUM CHLORIDE: 9 INJECTION, SOLUTION INTRAVENOUS at 05:25

## 2021-02-03 RX ADMIN — PANTOPRAZOLE SODIUM 40 MG: 40 INJECTION, POWDER, FOR SOLUTION INTRAVENOUS at 09:40

## 2021-02-03 RX ADMIN — POLYVINYL ALCOHOL 1 DROP: 14 SOLUTION/ DROPS OPHTHALMIC at 09:40

## 2021-02-03 RX ADMIN — PIPERACILLIN AND TAZOBACTAM 3375 MG: 3; .375 INJECTION, POWDER, LYOPHILIZED, FOR SOLUTION INTRAVENOUS at 17:32

## 2021-02-03 RX ADMIN — SODIUM CHLORIDE, PRESERVATIVE FREE 10 ML: 5 INJECTION INTRAVENOUS at 09:40

## 2021-02-03 RX ADMIN — POLYVINYL ALCOHOL 1 DROP: 14 SOLUTION/ DROPS OPHTHALMIC at 17:19

## 2021-02-03 RX ADMIN — SODIUM CHLORIDE 25 ML: 9 INJECTION, SOLUTION INTRAVENOUS at 21:03

## 2021-02-03 ASSESSMENT — PULMONARY FUNCTION TESTS
PIF_VALUE: 1
PIF_VALUE: 0
PIF_VALUE: 1

## 2021-02-03 ASSESSMENT — PAIN SCALES - GENERAL
PAINLEVEL_OUTOF10: 0

## 2021-02-03 NOTE — ANESTHESIA PRE PROCEDURE
Department of Anesthesiology  Preprocedure Note       Name:  Enedina Velázquez   Age:  80 y.o.  :  1919                                          MRN:  [id number]         Date:  2/3/2021      Surgeon: Destinee Christianson):  Gray Infante MD    Procedure: ERCP ENDOSCOPIC RETROGRADE CHOLANGIOPANCREATOGRAPHY (N/A )    Medications prior to admission:   Prior to Admission medications    Medication Sig Start Date End Date Taking?  Authorizing Provider   ondansetron (ZOFRAN) 4 MG tablet Take 1 tablet by mouth 3 times daily as needed for Nausea or Vomiting 20  Yes Gray Infante MD   vitamin E 1000 units capsule Take 1,000 Units by mouth daily   Yes Historical Provider, MD   bevacizumab (AVASTIN) 2.5 mg/0.1 ml syringe Inject 2.5 mg into the eye once Every 6 weeks in the left eye   Yes Historical Provider, MD   omeprazole (PRILOSEC) 20 MG capsule Take 40 mg by mouth Daily    Yes Historical Provider, MD   pravastatin (PRAVACHOL) 40 MG tablet Take 40 mg by mouth nightly   Yes Historical Provider, MD   Multiple Vitamins-Minerals (MULTIVITAMIN PO) Take 1 tablet by mouth daily    Yes Historical Provider, MD   Multiple Vitamins-Minerals (PRESERVISION AREDS) CAPS Take 1 capsule by mouth daily   Yes Historical Provider, MD   carboxymethylcellulose (REFRESH CELLUVISC) 1 % ophthalmic solution Place 1 drop into both eyes 3 times daily   Yes Historical Provider, MD   Vitamin D (CHOLECALCIFEROL) 1000 UNITS CAPS capsule Take 1,000 Units by mouth 2 times daily   Yes Historical Provider, MD       Current medications:    Current Facility-Administered Medications   Medication Dose Route Frequency Provider Last Rate Last Admin    piperacillin-tazobactam (ZOSYN) 3,375 mg in dextrose 5 % 100 mL IVPB extended infusion (mini-bag)  3,375 mg Intravenous Q8H Vince Rajput MD        0.9 % sodium chloride infusion admixture  25 mL Intravenous Q8H Rikki Andersen MD        polyvinyl alcohol (LIQUIFILM TEARS) 1.4 % ophthalmic solution 1 drop  1 drop Both Eyes TID Donovan Munroe MD   1 drop at 02/03/21 0940    ocuvite-lutein multivitamin 1 capsule  1 capsule Oral Daily Donovan Munroe MD        pantoprazole (PROTONIX) injection 40 mg  40 mg Intravenous Daily Donovan Munroe MD   40 mg at 02/03/21 0940    And    sodium chloride (PF) 0.9 % injection 10 mL  10 mL Intravenous Daily Donovan Munroe MD   10 mL at 02/03/21 0940    0.9 % sodium chloride infusion   Intravenous Continuous Donovan Munroe  mL/hr at 02/03/21 0525 New Bag at 02/03/21 0525       Allergies:  No Known Allergies    Problem List:    Patient Active Problem List   Diagnosis Code    Aortic stenosis, moderate I35.0    Acute blood loss anemia D62    Gastrointestinal hemorrhage K92.2    Gastroesophageal reflux disease with esophagitis K21.00    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 Montserrat Myers.     Cancer (HCC)     melanoma x3    Cellulitis     Dizziness     Easy bruising     GERD (gastroesophageal reflux disease)     H/O emphysema     Heart murmur     Heartburn     History of stress test     Lightheadedness     Macular degeneration     Passed out     Stroke [facility])     Unspecified cerebral artery occlusion with cerebral infarction        Past Surgical History:        Procedure Laterality Date    CATARACT REMOVAL      ERCP N/A 3/11/2020    ERCP STONE REMOVAL performed by Loan Wisdom MD at [address] ERCP  3/11/2020    ERCP SPHINCTER/PAPILLOTOMY performed by Loan Wisdom MD at [address] ERCP N/A 12/11/2020    ERCP STENT REMOVAL/EXCHANGE performed by Carroll Armijo MD at [address] [facility] ERCP N/A 12/11/2020    ERCP STONE REMOVAL performed by Carroll Armijo MD at [address] [facility]      JOINT REPLACEMENT      left hip    TONSILLECTOMY AND ADENOIDECTOMY      UPPER GASTROINTESTINAL ENDOSCOPY         Social History:    Social History     Tobacco Use    Smoking status: Former Smoker    Smokeless tobacco: Never Used    Tobacco comment: quit smoking cigars a very long time ago   Substance Use Topics    Alcohol use: No                                Counseling given: Not Answered  Comment: quit smoking cigars a very long time ago      Vital Signs (Current):   Vitals:    02/02/21 1557 02/02/21 1949 02/03/21 0031 02/03/21 0757   BP: 136/66 136/86 128/82 135/68   Pulse: 65 70 63 76   Resp: 16 16 16 15   Temp: 97.7 °F (36.5 °C) 97.8 °F (36.6 °C) 97.9 °F (36.6 °C) 98.3 °F (36.8 °C)   TempSrc: Oral Oral Oral Temporal   SpO2: 97% 96% 95% 97%   Height:                                                  BP Readings from Last 3 Encounters:   02/03/21 135/68   12/11/20 134/65   12/11/20 139/65       NPO Status: Time of last liquid consumption: 2335                        Time of last solid consumption: 2035                        Date of last liquid consumption: 02/02/21                        Date of last solid food consumption: 02/02/21    BMI:   Wt Readings from Last 3 Encounters:   12/11/20 144 lb (65.3 kg)   12/08/20 144 lb (65.3 kg)   03/10/20 148 lb 11.2 oz (67.4 kg)     Body mass index is 23.24 kg/m².     CBC:   Lab Results   Component Value Date    WBC 6.2 02/01/2021    RBC 3.73 02/01/2021    HGB 11.1 02/01/2021    HCT 34.4 02/01/2021    MCV 94.9 02/01/2021    RDW 15.6 02/01/2021     02/01/2021       CMP:   Lab Results   Component Value Date     02/02/2021    K 4.0 02/02/2021    K 4.2 02/01/2021     02/02/2021    CO2 21 02/02/2021    BUN 15 02/02/2021 CREATININE 0.9 02/02/2021    GFRAA >60 02/02/2021    LABGLOM >60 02/02/2021    GLUCOSE 78 02/02/2021    GLUCOSE 90 08/03/2011    PROT 5.6 02/02/2021    CALCIUM 7.7 02/02/2021    BILITOT 2.8 02/02/2021    ALKPHOS 419 02/02/2021    AST 59 02/02/2021    ALT 62 02/02/2021       POC Tests: No results for input(s): POCGLU, POCNA, POCK, POCCL, POCBUN, POCHEMO, POCHCT in the last 72 hours. Coags:   Lab Results   Component Value Date    PROTIME 13.1 03/10/2020    INR 1.2 03/10/2020    APTT 25.8 11/22/2015       HCG (If Applicable): No results found for: PREGTESTUR, PREGSERUM, HCG, HCGQUANT     ABGs: No results found for: PHART, PO2ART, EUO2LKY, AUK1UUW, BEART, K9PSEYPE     Type & Screen (If Applicable):  No results found for: LABABO, [address]    Anesthesia Evaluation  Patient summary reviewed no history of anesthetic complications:   Airway: Mallampati: II  TM distance: >3 FB   Neck ROM: full  Mouth opening: > = 3 FB Dental:    (+) upper dentures and lower dentures      Pulmonary:Negative Pulmonary ROS breath sounds clear to auscultation                             Cardiovascular:    (+) valvular problems/murmurs (Moderate AS): AS,         Rhythm: regular  Rate: normal           Beta Blocker:  Not on Beta Blocker         Neuro/Psych:   (+) CVA:,             GI/Hepatic/Renal:   (+) GERD:,          ROS comment: Cholelithiasis with choledocholithiasis  Cm's esophagus with esophagitis. Endo/Other:    (+) : arthritis: OA., .                 Abdominal:         (-) obese     Vascular: negative vascular ROS. Anesthesia Plan      MAC     ASA 3       Induction: intravenous. MIPS: Prophylactic antiemetics administered. Anesthetic plan and risks discussed with patient and child/children. Plan discussed with CRNA.               Ryann Speaks, DO   2/3/2021

## 2021-02-03 NOTE — OP NOTE
SURGEON: Lupe Healy MD        PREOPERATIVE DIAGNOSIS: Choledocholithiasis      POSTOPERATIVE DIAGNOSES:  1. Choledocholithiasis  2. Biliary stricture      OPERATION:  1. ERCP stone extraction  2. ERCP removal and replacement of bile duct stent (10 mm x 60 mm fully covered wall flex)    ANESTHESIA: LMAC       ESTIMATED BLOOD LOSS: Minimal.         INDICATION: This is a 80 y.o. male with radiologic evidence of choledocholithiasis and concern for cholangitis. The patient agreed to undergo the ERCP. The risks, benefits, and alternatives were explained to the patient for the procedure including bleeding, infection, perforation, and pancreatitis. The patient understood and agreed to proceed. DESCRIPTION OF PROCEDURE: The patient was brought to the operating room and he underwent [facility] anesthesia by the anesthesia team. He was then placed in the supine position. The flexible duodenoscope was inserted down the oropharynx and passed down the esophagus into the stomach and into the duodenum. The papilla was identified. The old metal stent had retracted up into the ampulla. After multiple maneuvers including using rat-tooth forceps and a balloon in the common bile duct, I was able to remove the stent. The stent was completely occluded with a large amount of stone and debris and there was purulent drainage from the common bile duct. A sphincterotome was then used to gain access into the common bile duct. A balloon was passed and contrast injection revealed adequate cannulation. Multiple sweeps were made of the common bile duct, a large amount of debris and stones were removed. A 10 mm x 60 mm fully covered wall flex stent was advanced into the common bile duct. There were no other abnormalities    There was good bile flow. The scope was then withdrawn. The patient tolerated the procedure well.       Lupe Healy MD

## 2021-02-03 NOTE — PROGRESS NOTES
Patient admitted to pacu, placed on all appropriate monitors, cart locked and in low position,siderails up.

## 2021-02-03 NOTE — PROGRESS NOTES
Subjective:    Chief complaint:    Having a lot of chills  No further rectal bleeding    Objective:    /68   Pulse 76   Temp 98.3 °F (36.8 °C) (Temporal)   Resp 15   Ht 5' 6\" (1.676 m)   SpO2 97%   BMI 23.24 kg/m²   General : Awake ,alert,no distress. Heart:  RRR, no murmurs, gallops, or rubs. Lungs:  CTA bilaterally, no wheeze, rales or rhonchi  Abd: bowel sounds present, nontender, nondistended, no masses  Extrem:  No clubbing, cyanosis, or edema    CBC:   Lab Results   Component Value Date    WBC 6.2 02/01/2021    RBC 3.73 02/01/2021    HGB 11.1 02/01/2021    HCT 34.4 02/01/2021    MCV 94.9 02/01/2021    MCH 30.8 02/01/2021    MCHC 32.5 02/01/2021    RDW 15.6 02/01/2021     02/01/2021    MPV 9.9 02/01/2021     BMP:    Lab Results   Component Value Date     02/02/2021    K 4.0 02/02/2021    K 4.2 02/01/2021     02/02/2021    CO2 21 02/02/2021    BUN 15 02/02/2021    LABALBU 2.8 02/02/2021    LABALBU 4.3 08/03/2011    CREATININE 0.9 02/02/2021    CALCIUM 7.7 02/02/2021    GFRAA >60 02/02/2021    LABGLOM >60 02/02/2021    GLUCOSE 78 02/02/2021    GLUCOSE 90 08/03/2011     PT/INR:    Lab Results   Component Value Date    PROTIME 13.1 03/10/2020    INR 1.2 03/10/2020     Troponin:    Lab Results   Component Value Date    TROPONINI <0.01 02/01/2021       No results for input(s): LABURIN in the last 72 hours. No results for input(s): BC in the last 72 hours. No results for input(s): Chanel Osiris in the last 72 hours.       Current Facility-Administered Medications:     piperacillin-tazobactam (ZOSYN) 3,375 mg in dextrose 5 % 100 mL IVPB extended infusion (mini-bag), 3,375 mg, Intravenous, Q8H, Vince Rajput MD    polyvinyl alcohol (LIQUIFILM TEARS) 1.4 % ophthalmic solution 1 drop, 1 drop, Both Eyes, TID, Danielle Osuna MD, 1 drop at 02/03/21 0940    ocuvite-lutein multivitamin 1 capsule, 1 capsule, Oral, Daily, Danielle Osuna MD    pantoprazole (PROTONIX) injection 40 mg, 40 mg, Intravenous, Daily, 40 mg at 02/03/21 0940 **AND** sodium chloride (PF) 0.9 % injection 10 mL, 10 mL, Intravenous, Daily, Dhara Harvey MD, 10 mL at 02/03/21 0940    0.9 % sodium chloride infusion, , Intravenous, Continuous, Dhara Harvey MD, Last Rate: 100 mL/hr at 02/03/21 0525, New Bag at 02/03/21 0525    Diet NPO, After Midnight    CT ABDOMEN PELVIS W IV CONTRAST Additional Contrast? None   Final Result   Mild-to-moderate intrahepatic biliary ductal dilatation suggest occluded   metallic common bile duct stent. No change in multiple bladder diverticula. Moderate to large hiatal hernia is unchanged. Hyperattenuation is seen in the partially visualized left corpus cavernosum   is nonspecific. Finding may represent hemorrhage versus contrast staining. Please correlate with physical exam for pain. Further evaluation may be   obtained with penile ultrasound if clinically warranted. Assessment:    Active Problems:    Acute GI bleeding  Resolved Problems:    * No resolved hospital problems. *  Questionable cholangitis    Plan: Add Zosyn  Check procalcitonin  Check 1 set of blood cultures  Supposed to go for ERCP        Andrewsukumar Gallo Pour  11:55 AM  2/3/2021    NOTE: This report was transcribed using voice recognition software.  Every effort was made to ensure accuracy; however, inadvertent transcription errors may be present

## 2021-02-03 NOTE — PLAN OF CARE
Problem: Falls - Risk of:  Goal: Will remain free from falls  Description: Will remain free from falls  2/3/2021 0038 by Korina Stern  Outcome: Met This Shift  2/2/2021 1101 by Marlee Dominguez RN  Outcome: Met This Shift  Goal: Absence of physical injury  Description: Absence of physical injury  Outcome: Met This Shift     Problem: Skin Integrity:  Goal: Will show no infection signs and symptoms  Description: Will show no infection signs and symptoms  2/3/2021 0038 by Marialuisa Stern  Outcome: Met This Shift  2/2/2021 1101 by Marlee Dominguez RN  Outcome: Met This Shift  Goal: Absence of new skin breakdown  Description: Absence of new skin breakdown  Outcome: Met This Shift     Problem: Bleeding:  Goal: Will show no signs and symptoms of excessive bleeding  Description: Will show no signs and symptoms of excessive bleeding  Outcome: Met This Shift     Problem: Inadequate oral food/beverage intake (NI-2.1)  Goal: Food and/or Nutrient Delivery  Description: Individualized approach for food/nutrient provision.   2/2/2021 1055 by Prateek Novak, MS, RD, LD  Outcome: Met This Shift

## 2021-02-04 LAB
ALBUMIN SERPL-MCNC: 3 G/DL (ref 3.5–5.2)
ALP BLD-CCNC: 504 U/L (ref 40–129)
ALT SERPL-CCNC: 62 U/L (ref 0–40)
ANION GAP SERPL CALCULATED.3IONS-SCNC: 14 MMOL/L (ref 7–16)
AST SERPL-CCNC: 70 U/L (ref 0–39)
BILIRUB SERPL-MCNC: 3.1 MG/DL (ref 0–1.2)
BUN BLDV-MCNC: 16 MG/DL (ref 8–23)
CALCIUM SERPL-MCNC: 7.9 MG/DL (ref 8.6–10.2)
CHLORIDE BLD-SCNC: 112 MMOL/L (ref 98–107)
CO2: 16 MMOL/L (ref 22–29)
CREAT SERPL-MCNC: 0.8 MG/DL (ref 0.7–1.2)
GFR AFRICAN AMERICAN: >60
GFR NON-AFRICAN AMERICAN: >60 ML/MIN/1.73
GLUCOSE BLD-MCNC: 92 MG/DL (ref 74–99)
POTASSIUM SERPL-SCNC: 3.9 MMOL/L (ref 3.5–5)
SODIUM BLD-SCNC: 142 MMOL/L (ref 132–146)
TOTAL PROTEIN: 6.1 G/DL (ref 6.4–8.3)

## 2021-02-04 PROCEDURE — 2060000000 HC ICU INTERMEDIATE R&B

## 2021-02-04 PROCEDURE — C9113 INJ PANTOPRAZOLE SODIUM, VIA: HCPCS | Performed by: SURGERY

## 2021-02-04 PROCEDURE — 2580000003 HC RX 258: Performed by: SURGERY

## 2021-02-04 PROCEDURE — 6360000002 HC RX W HCPCS: Performed by: SURGERY

## 2021-02-04 PROCEDURE — 80053 COMPREHEN METABOLIC PANEL: CPT

## 2021-02-04 PROCEDURE — 36415 COLL VENOUS BLD VENIPUNCTURE: CPT

## 2021-02-04 RX ORDER — PANTOPRAZOLE SODIUM 40 MG/1
40 TABLET, DELAYED RELEASE ORAL
Status: DISCONTINUED | OUTPATIENT
Start: 2021-02-05 | End: 2021-02-05 | Stop reason: HOSPADM

## 2021-02-04 RX ADMIN — PIPERACILLIN AND TAZOBACTAM 3375 MG: 3; .375 INJECTION, POWDER, LYOPHILIZED, FOR SOLUTION INTRAVENOUS at 00:49

## 2021-02-04 RX ADMIN — SODIUM CHLORIDE, PRESERVATIVE FREE 10 ML: 5 INJECTION INTRAVENOUS at 09:06

## 2021-02-04 RX ADMIN — PANTOPRAZOLE SODIUM 40 MG: 40 INJECTION, POWDER, FOR SOLUTION INTRAVENOUS at 09:06

## 2021-02-04 RX ADMIN — SODIUM CHLORIDE 25 ML: 9 INJECTION, SOLUTION INTRAVENOUS at 09:07

## 2021-02-04 RX ADMIN — POLYVINYL ALCOHOL 1 DROP: 14 SOLUTION/ DROPS OPHTHALMIC at 09:07

## 2021-02-04 RX ADMIN — POLYVINYL ALCOHOL 1 DROP: 14 SOLUTION/ DROPS OPHTHALMIC at 14:20

## 2021-02-04 RX ADMIN — PIPERACILLIN AND TAZOBACTAM 3375 MG: 3; .375 INJECTION, POWDER, LYOPHILIZED, FOR SOLUTION INTRAVENOUS at 09:06

## 2021-02-04 ASSESSMENT — PAIN SCALES - GENERAL
PAINLEVEL_OUTOF10: 0

## 2021-02-04 NOTE — PROGRESS NOTES
ENDOSCOPIC SURGERY  DAILY PROGRESS NOTE  2/4/2021    Subjective:  Pt is doing very well this morning. He had an ERCP with removal and replacement of bile duct stent. Awaiting morning labs. He has no abdominal pain this morning. Handling his clear liquid diet. Currently no signs of post-op pancreatitis.       Objective:  /67   Pulse 83   Temp 98 °F (36.7 °C) (Temporal)   Resp 19   Ht 5' 6\" (1.676 m)   SpO2 98%   BMI 23.24 kg/m²     GENERAL:  Laying in bed, awake, alert, cooperative, no apparent distress  HEAD: Normocephalic, atraumatic  EYES: No sclera icterus, pupils equal  LUNGS:  No increased work of breathing  CARDIOVASCULAR:  RR  ABDOMEN:  Soft, non-tender, non-distended  EXTREMITIES: No edema or swelling  SKIN: Warm and dry    Assessment/Plan:  80 y.o. male with hyperbilirubinemia and occulusion of his bile duct stent s/p removal and replacement of stent     Overall care per primary  Okay to have diet advanced if labs are stable  Abx can be stopped from surgery standpoint   No other acute endoscopic surgical intervention  Will discuss with Dr. Lane Muniz       Electronically signed by Wesley Clark DO on 2/4/2021 at 6:31 AM     As above  I expect bili to trend down tomorrow  Derl Rausch to discharge from my point of view    Dorcas Milner MD

## 2021-02-04 NOTE — CONSULTS
CARDIOLOGY CONSULTATION    Patient Name:  Neila Holstein    :  1919    Reason for Consultation:   Preoperative cardiovascular risk assessment    History of Present Illness: Neila Holstein presents to [facility])  -. Following his admission he liver enzymes were notably elevated upon a recent history dating back to 2020 he was found to have an obstruction of his common bile duct for which he received a stent. He is presently undergone further studies which suggest possible occlusion beyond the stent and for this reason it was recommended by Dr. Enoc Castano that he be considered for surgical removal of his gallbladder. He does have known heart disease in the form of aortic stenosis based upon an echocardiogram obtained 6 years ago which was deemed moderate in severity. He denies any exertional chest discomfort nor dyspnea. 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 I have been asked to see him in consultation to review the potential cardiovascular risks of anticipated surgery. Besides aortic stenosis he does have a previous history of an apparent TIA and was found to have bilateral vertebral artery stenosis as well. He denies any lightheadedness, palpitations nor significant shortness of breath presently and until recently was considered physically active. Past Medical History:   has a past medical history of Anemia, Aortic stenosis, Arthritis, Cm's esophagus with esophagitis, Cancer (HCC), Cellulitis, Dizziness, Easy bruising, GERD (gastroesophageal reflux disease), H/O emphysema, Heart murmur, Heartburn, History of stress test, Lightheadedness, Macular degeneration, Passed out, Stroke [facility]), and Unspecified cerebral artery occlusion with cerebral infarction. Surgical History:   has a past surgical history that includes fracture surgery; Hemorrhoid surgery; Tonsillectomy and adenoidectomy;  Cataract removal; Upper gastrointestinal endoscopy; ERCP (N/A, 3/11/2020); ERCP (3/11/2020); joint replacement; ERCP (N/A, 2020); and ERCP (N/A, 2020). Social History:   reports that he has quit smoking. He has never used smokeless tobacco. He reports that he does not drink alcohol or use drugs. Family History:  family history includes Heart Attack in his brother; Heart Disease in his brother, father, and mother. Father also had cancer and mother  secondary to infirmities of old age. Medications:  Prior to Admission medications    Medication Sig Start Date End Date Taking? Authorizing Provider   ondansetron (ZOFRAN) 4 MG tablet Take 1 tablet by mouth 3 times daily as needed for Nausea or Vomiting 20  Yes Brii Chávez MD   vitamin E 1000 units capsule Take 1,000 Units by mouth daily   Yes Historical Provider, MD   bevacizumab (AVASTIN) 2.5 mg/0.1 ml syringe Inject 2.5 mg into the eye once Every 6 weeks in the left eye   Yes Historical Provider, MD   omeprazole (PRILOSEC) 20 MG capsule Take 40 mg by mouth Daily    Yes Historical Provider, MD   pravastatin (PRAVACHOL) 40 MG tablet Take 40 mg by mouth nightly   Yes Historical Provider, MD   Multiple Vitamins-Minerals (MULTIVITAMIN PO) Take 1 tablet by mouth daily    Yes Historical Provider, MD   Multiple Vitamins-Minerals (PRESERVISION AREDS) CAPS Take 1 capsule by mouth daily   Yes Historical Provider, MD   carboxymethylcellulose (REFRESH CELLUVISC) 1 % ophthalmic solution Place 1 drop into both eyes 3 times daily   Yes Historical Provider, MD   Vitamin D (CHOLECALCIFEROL) 1000 UNITS CAPS capsule Take 1,000 Units by mouth 2 times daily   Yes Historical Provider, MD       Allergies:  Patient has no known allergies. Review of Systems:   · Constitutional: there has been no unanticipated weight loss. There's been no significant change in energy level, sleep pattern or activity level. No fever chills or rigors. · Eyes: No visual changes or diplopia. No scleral icterus. · ENT: No Headaches, hearing loss or vertigo.  No mouth sores or Carotids brisk in upstroke without bruits, no abnormal JVP noted at 45°. Chest: Even excursion  Lungs: Lungs clear to auscultation bilaterally. No retractions or use of accessory muscles. No tactile vocal fremitus. No rhonchi, crackles or rales. Heart:  S1 > S2. Regular rhythm S4 gallop; grade 3/6 harsh systolic murmur heard at the apex as well as the second right intercostal space. No diastolic murmurs noted. Murmur. No rub, palpable thrill or heave noted. PMI 5th intercostal space midclavicular line. Abdomen: Abdomen soft, moderately protuberant, non-tender. BS normal. No masses, organomegaly. No hernia noted. Extremities: Extremities normal. No deformities, edema, or skin discoloration. No cyanosis or clubbing noted to the nails. Peripheral pulses present 2+ upper extremities and present 1+  lower extremities. Musculoskeletal: Spine ROM normal. Muscular strength intact. Neuro: Cranial nerves intact. Motor: Strength 5/5 in all extremities. Reflexes 2+ in all extremities. No focal weakness. Sensory: grossly normal to touch. Coordination intact. Pertinent Labs:  CBC:   Recent Labs     02/01/21  1025 02/01/21  2305   WBC 6.2  --    HGB 11.5* 11.1*     --      BMP:  Recent Labs     02/01/21  1233 02/02/21  0501    141   K 4.2 4.0   * 112*   CO2 23 21*   BUN 18 15   CREATININE 0.9 0.9   GLUCOSE 120* 78   LABGLOM >60 >60     ABGs: No results found for: PH, PO2, PCO2  INR: No results for input(s): INR in the last 72 hours.   PRO-BNP:   Lab Results   Component Value Date    PROBNP 1,097 (H) 02/01/2021      Cardiac Injury Profile:   Recent Labs     02/01/21  1233   TROPONINI <0.01      Lipid Profile:   Lab Results   Component Value Date    TRIG see note 03/26/2020    HDL see note 03/26/2020    [address] see note 03/26/2020    CHOL see note 03/26/2020      Thyroid:   Lab Results   Component Value Date    TSH 3.898 08/03/2011      Hemoglobin A1C: No components found for: HGBA1C   ECG:  See report    Radiology:  Ct Abdomen Pelvis W Iv Contrast Additional Contrast? None    Result Date: 2/1/2021  EXAMINATION: CT OF THE ABDOMEN AND PELVIS WITH CONTRAST 2/1/2021 1:42 pm TECHNIQUE: CT of the abdomen and pelvis was performed with the administration of intravenous contrast. Multiplanar reformatted images are provided for review. Dose modulation, iterative reconstruction, and/or weight based adjustment of the mA/kV was utilized to reduce the radiation dose to as low as reasonably achievable. COMPARISON: CT abdomen pelvis December 15, 2020 and March 10, 2020 HISTORY: ORDERING SYSTEM PROVIDED HISTORY: GI bleeding, recent biliary stent placed TECHNOLOGIST PROVIDED HISTORY: Reason for exam:->GI bleeding, recent biliary stent placed Additional Contrast?->None Decision Support Exception->Emergency Medical Condition (MA) What reading provider will be dictating this exam?->CRC FINDINGS: Lung Bases: Compressive atelectasis seen in the posterior lung bases. Liver: No hepatic lesions identified. Bile ducts: Mild diffuse intrahepatic biliary ductal dilatation. Common bile duct stent identified. Hydropic gallbladder with sludge. No pericholecystic fat stranding. Pancreas: No pancreatic lesions. The pancreatic duct is not dilated. Adrenal glands: Normal in appearance. Kidneys: No evidence of hydronephrosis. No abnormal renal lesions. Spleen: No enhancing lesions. Bowel: Moderate to large hiatal hernia, unchanged. No evidence of bowel obstruction. Mildly prominent appendix, unchanged compared to March 2020. Peritoneum/Retroperitoneum: No abnormal pelvic lymphadenopathy. Pelvis: The prostate is enlarged measuring 5 cm in transverse axis. Multiple diverticula identified in the anterior and left posterior aspect of the bladder. Circumferential hyperattenuation around the left corpus cavernosum. Bones/Soft tissues: No aggressive osseous lesions.     Mild-to-moderate intrahepatic biliary ductal dilatation suggest occluded metallic common bile duct stent. No change in multiple bladder diverticula. Moderate to large hiatal hernia is unchanged. Hyperattenuation is seen in the partially visualized left corpus cavernosum is nonspecific. Finding may represent hemorrhage versus contrast staining. Please correlate with physical exam for pain. Further evaluation may be obtained with penile ultrasound if clinically warranted. Fluoro For Surgical Procedures    Result Date: 2/3/2021  EXAMINATION: SPOT FLUOROSCOPIC IMAGES 2/3/2021 3:34 pm TECHNIQUE: Fluoroscopy was provided by the radiology department for procedure. Radiologist was not present during examination. FLUOROSCOPY DOSE AND TYPE OR TIME AND EXPOSURES: Fluoroscopy time equals 159 seconds. Total dose equals 75.83 mGy COMPARISON: None HISTORY: ORDERING SYSTEM PROVIDED HISTORY: ERCP TECHNOLOGIST PROVIDED HISTORY: Reason for exam:->ERCP What reading provider will be dictating this exam?->CRC Intraprocedural imaging. FINDINGS: 12 spot images of the right upper quadrant were obtained. Intraprocedural fluoroscopic spot images as above. See separate procedure report for more information. Assessment:    Active Problems:    Acute GI bleeding  Resolved Problems:    * No resolved hospital problems. *      Plan:  Based upon Mr. Stanton's clinical presentation would appear indeed that he does have significant pathology associated with his underlying gallbladder disease and for unknown reasons has further stenosis within his bile duct beyond the stent. Despite his age of 80 years he has no history of anginal-like symptoms nor previous history of myocardial infarction. He does however have a murmur suggestive of severe aortic stenosis rather than moderate. Nonetheless I have informed him that he could undergo surgery as long as fluids were not excessive.   Will review repeat two-dimensional echocardiogram as well postoperatively but by auscultation his valve seems to be more than moderate in stenosis. He under stands the risks of surgery and understands the indications as explained by his surgeon and wishes to proceed at this time. I have spent more than 45 minutes face to face with Radha Estevez reviewing notes and laboratory data with greater than 50% of this time instructing and counseling the patient regarding my findings and recommendations and I have answered all questions as posed to me by Mr. Stanton. Thank you, Becky Harding MD for allowing me to consult in the care of this patient. Angelica Hoffmann DO, FACP, FACC, [facility]    NOTE:  This report was transcribed using voice recognition software. Every effort was made to ensure accuracy; however, inadvertent computerized transcription errors may be present.

## 2021-02-04 NOTE — PROGRESS NOTES
Subjective:    Chief complaint:    Seen earlier this afternoon  Eating lunch  Daughter is by the bedside  He feels better    Objective:    /68   Pulse 66   Temp 98.3 °F (36.8 °C) (Temporal)   Resp 20   Ht 5' 6\" (1.676 m)   SpO2 98%   BMI 23.24 kg/m²   General : Awake ,alert,no distress. Heart:  RRR, no murmurs, gallops, or rubs. Lungs:  CTA bilaterally, no wheeze, rales or rhonchi  Abd: bowel sounds present, nontender, nondistended, no masses  Extrem:  No clubbing, cyanosis, or edema    CBC:   Lab Results   Component Value Date    WBC 6.2 02/01/2021    RBC 3.73 02/01/2021    HGB 11.1 02/01/2021    HCT 34.4 02/01/2021    MCV 94.9 02/01/2021    MCH 30.8 02/01/2021    MCHC 32.5 02/01/2021    RDW 15.6 02/01/2021     02/01/2021    MPV 9.9 02/01/2021     BMP:    Lab Results   Component Value Date     02/02/2021    K 4.0 02/02/2021    K 4.2 02/01/2021     02/02/2021    CO2 21 02/02/2021    BUN 15 02/02/2021    LABALBU 2.8 02/02/2021    LABALBU 4.3 08/03/2011    CREATININE 0.9 02/02/2021    CALCIUM 7.7 02/02/2021    GFRAA >60 02/02/2021    LABGLOM >60 02/02/2021    GLUCOSE 78 02/02/2021    GLUCOSE 90 08/03/2011     PT/INR:    Lab Results   Component Value Date    PROTIME 13.1 03/10/2020    INR 1.2 03/10/2020     Troponin:    Lab Results   Component Value Date    TROPONINI <0.01 02/01/2021       No results for input(s): LABURIN in the last 72 hours. Recent Labs     02/03/21  1314   BC 24 Hours no growth     No results for input(s): BLOODCULT2 in the last 72 hours.       Current Facility-Administered Medications:     [START ON 2/5/2021] pantoprazole (PROTONIX) tablet 40 mg, 40 mg, Oral, QAM AC, Kdai Daley MD    polyvinyl alcohol (LIQUIFILM TEARS) 1.4 % ophthalmic solution 1 drop, 1 drop, Both Eyes, TID, Jeison Thomason MD, 1 drop at 02/04/21 1420    ocuvite-lutein multivitamin 1 capsule, 1 capsule, Oral, Daily, Jeison Thomason MD    0.9 % sodium chloride infusion, , Intravenous, Continuous, Fang Gillette MD, Last Rate: 50 mL/hr at 02/04/21 1154, Rate Change at 02/04/21 1154    DIET LOW FAT;    FLUORO FOR SURGICAL PROCEDURES   Final Result   Intraprocedural fluoroscopic spot images as above. See separate procedure   report for more information. CT ABDOMEN PELVIS W IV CONTRAST Additional Contrast? None   Final Result   Mild-to-moderate intrahepatic biliary ductal dilatation suggest occluded   metallic common bile duct stent. No change in multiple bladder diverticula. Moderate to large hiatal hernia is unchanged. Hyperattenuation is seen in the partially visualized left corpus cavernosum   is nonspecific. Finding may represent hemorrhage versus contrast staining. Please correlate with physical exam for pain. Further evaluation may be   obtained with penile ultrasound if clinically warranted. Assessment:    Active Problems:    Acute GI bleeding  Resolved Problems:    * No resolved hospital problems. *  Questionable cholangitis    Plan:    Discussed with Dr. Aundrea Tilley they feel since stent is placed antibiotics not needed  Discontinue Zosyn  Likely discharge home in the morning  Outpatient cholecystectomy is being planned      Brie Rajput  5:08 PM  2/4/2021    NOTE: This report was transcribed using voice recognition software.  Every effort was made to ensure accuracy; however, inadvertent transcription errors may be present

## 2021-02-04 NOTE — CARE COORDINATION
Transition of Care- Pt lives in a 1 story home with his wife, discharge plan is return home with wife. 2/3-ERCP with bile duct stent placed-discharge pending therapy evaluations and Gen Surgery plan for cholecystectomy, remains on Zosyn Q8 and Cardiology following-will need Echo post-op if going for lap julianna. CM/SW following.      Vijay RENTERIAN, RN  [facility]   [phone]

## 2021-02-05 VITALS
OXYGEN SATURATION: 96 % | BODY MASS INDEX: 23.24 KG/M2 | DIASTOLIC BLOOD PRESSURE: 70 MMHG | TEMPERATURE: 98.6 F | HEART RATE: 87 BPM | HEIGHT: 66 IN | SYSTOLIC BLOOD PRESSURE: 123 MMHG | RESPIRATION RATE: 19 BRPM

## 2021-02-05 LAB
ALBUMIN SERPL-MCNC: 2.7 G/DL (ref 3.5–5.2)
ALP BLD-CCNC: 342 U/L (ref 40–129)
ALT SERPL-CCNC: 39 U/L (ref 0–40)
ANION GAP SERPL CALCULATED.3IONS-SCNC: 7 MMOL/L (ref 7–16)
AST SERPL-CCNC: 39 U/L (ref 0–39)
BILIRUB SERPL-MCNC: 1.5 MG/DL (ref 0–1.2)
BUN BLDV-MCNC: 15 MG/DL (ref 8–23)
CALCIUM SERPL-MCNC: 7.6 MG/DL (ref 8.6–10.2)
CHLORIDE BLD-SCNC: 116 MMOL/L (ref 98–107)
CO2: 20 MMOL/L (ref 22–29)
CREAT SERPL-MCNC: 0.8 MG/DL (ref 0.7–1.2)
GFR AFRICAN AMERICAN: >60
GFR NON-AFRICAN AMERICAN: >60 ML/MIN/1.73
GLUCOSE BLD-MCNC: 96 MG/DL (ref 74–99)
LV EF: 60 %
LVEF MODALITY: NORMAL
POTASSIUM SERPL-SCNC: 3.5 MMOL/L (ref 3.5–5)
SODIUM BLD-SCNC: 143 MMOL/L (ref 132–146)
TOTAL PROTEIN: 5.3 G/DL (ref 6.4–8.3)

## 2021-02-05 PROCEDURE — 6370000000 HC RX 637 (ALT 250 FOR IP): Performed by: INTERNAL MEDICINE

## 2021-02-05 PROCEDURE — 6370000000 HC RX 637 (ALT 250 FOR IP): Performed by: SURGERY

## 2021-02-05 PROCEDURE — 93306 TTE W/DOPPLER COMPLETE: CPT

## 2021-02-05 PROCEDURE — 80053 COMPREHEN METABOLIC PANEL: CPT

## 2021-02-05 PROCEDURE — 36415 COLL VENOUS BLD VENIPUNCTURE: CPT

## 2021-02-05 RX ADMIN — POLYVINYL ALCOHOL 1 DROP: 14 SOLUTION/ DROPS OPHTHALMIC at 09:07

## 2021-02-05 RX ADMIN — Medication 1 CAPSULE: at 09:06

## 2021-02-05 RX ADMIN — PANTOPRAZOLE SODIUM 40 MG: 40 TABLET, DELAYED RELEASE ORAL at 05:38

## 2021-02-05 ASSESSMENT — PAIN SCALES - GENERAL: PAINLEVEL_OUTOF10: 0

## 2021-02-05 NOTE — PROGRESS NOTES
Subjective:    Chief complaint:    Still doing ok  Eating ok    Objective:    /70   Pulse 87   Temp 98.6 °F (37 °C) (Temporal)   Resp 19   Ht 5' 6\" (1.676 m)   SpO2 96%   BMI 23.24 kg/m²   General : Awake ,alert,no distress. Heart:  RRR, no murmurs, gallops, or rubs. Lungs:  CTA bilaterally, no wheeze, rales or rhonchi  Abd: bowel sounds present, nontender, nondistended, no masses  Extrem:  No clubbing, cyanosis, or edema    CBC:   Lab Results   Component Value Date    WBC 6.2 02/01/2021    RBC 3.73 02/01/2021    HGB 11.1 02/01/2021    HCT 34.4 02/01/2021    MCV 94.9 02/01/2021    MCH 30.8 02/01/2021    MCHC 32.5 02/01/2021    RDW 15.6 02/01/2021     02/01/2021    MPV 9.9 02/01/2021     BMP:    Lab Results   Component Value Date     02/04/2021    K 3.9 02/04/2021    K 4.2 02/01/2021     02/04/2021    CO2 16 02/04/2021    BUN 16 02/04/2021    LABALBU 3.0 02/04/2021    LABALBU 4.3 08/03/2011    CREATININE 0.8 02/04/2021    CALCIUM 7.9 02/04/2021    GFRAA >60 02/04/2021    LABGLOM >60 02/04/2021    GLUCOSE 92 02/04/2021    GLUCOSE 90 08/03/2011     PT/INR:    Lab Results   Component Value Date    PROTIME 13.1 03/10/2020    INR 1.2 03/10/2020     Troponin:    Lab Results   Component Value Date    TROPONINI <0.01 02/01/2021       No results for input(s): LABURIN in the last 72 hours. Recent Labs     02/03/21  1314   BC 24 Hours no growth     No results for input(s): BLOODCULT2 in the last 72 hours.       Current Facility-Administered Medications:     pantoprazole (PROTONIX) tablet 40 mg, 40 mg, Oral, Fang COHEN MD, 40 mg at 02/05/21 9363    perflutren lipid microspheres (DEFINITY) injection 1.65 mg, 1.5 mL, Intravenous, ONCE PRN, Sheyla Mixer, DO    polyvinyl alcohol (LIQUIFILM TEARS) 1.4 % ophthalmic solution 1 drop, 1 drop, Both Eyes, TID, Genie Escalera MD, 1 drop at 02/05/21 0907    ocuvite-lutein multivitamin 1 capsule, 1 capsule, Oral, Daily, Consandre Byron Esquivel MD, 1 capsule at 02/05/21 0906    0.9 % sodium chloride infusion, , Intravenous, Continuous, Stefani Kaufman MD, Last Rate: 50 mL/hr at 02/04/21 1154, Rate Change at 02/04/21 1154    DIET LOW FAT;    FLUORO FOR SURGICAL PROCEDURES   Final Result   Intraprocedural fluoroscopic spot images as above. See separate procedure   report for more information. CT ABDOMEN PELVIS W IV CONTRAST Additional Contrast? None   Final Result   Mild-to-moderate intrahepatic biliary ductal dilatation suggest occluded   metallic common bile duct stent. No change in multiple bladder diverticula. Moderate to large hiatal hernia is unchanged. Hyperattenuation is seen in the partially visualized left corpus cavernosum   is nonspecific. Finding may represent hemorrhage versus contrast staining. Please correlate with physical exam for pain. Further evaluation may be   obtained with penile ultrasound if clinically warranted. Assessment:    Active Problems:    Acute GI bleeding  Resolved Problems:    * No resolved hospital problems. *  Questionable cholangitis    Plan:    [id number] [address] for [employer] after echo  Out patient cholecystectomy    Vince Reyes  12:18 PM  2/5/2021    NOTE: This report was transcribed using voice recognition software.  Every effort was made to ensure accuracy; however, inadvertent transcription errors may be present

## 2021-02-05 NOTE — PROGRESS NOTES
PROGRESS NOTE       PATIENT PROBLEM LIST:  Active Problems:    Acute GI bleeding  Resolved Problems:    * No resolved hospital problems. *      SUBJECTIVE:  Hamilton Boyd states he feels about the same and just wants to get better. He denies any significant shortness of breath nor chest discomfort or palpitations presently. REVIEW OF SYSTEMS:  General ROS: negative for - fatigue, malaise,  weight gain or weight loss  Psychological ROS: negative for - anxiety , depression  Ophthalmic ROS: negative for - decreased vision or visual distortion. ENT ROS: negative  Allergy and Immunology ROS: negative  Hematological and Lymphatic ROS: negative  Endocrine: no heat or cold intolerance and no polyphagia, polydipsia, or polyuria  Respiratory ROS: positive for - shortness of breath  Cardiovascular ROS: positive for - dyspnea on exertion and rapid heart rate. Heart murmur  Gastrointestinal ROS: no abdominal pain, change in bowel habits, or black or bloody stools  Genito-Urinary ROS: no nocturia, dysuria, trouble voiding, frequency or hematuria  Musculoskeletal ROS: negative for- myalgias, arthralgias, or claudication  Neurological ROS: no TIA or stroke symptoms otherwise no significant change in symptoms or problems since yesterday as documented in previous progress notes. SCHEDULED MEDICATIONS:   [START ON 2/5/2021] pantoprazole  40 mg Oral QAM AC    polyvinyl alcohol  1 drop Both Eyes TID    ocuvite-lutein  1 capsule Oral Daily       VITAL SIGNS:                                                                                                                          /62   Pulse 75   Temp 97.9 °F (36.6 °C) (Temporal)   Resp 22   Ht 5' 6\" (1.676 m)   SpO2 97%   BMI 23.24 kg/m²   No data found. OBJECTIVE:    HEENT: PERRL, EOM  Intact; sclera non-icteric, conjunctiva pink. Carotids are decreased and delayed. Radiated murmur to base of carotids bilaterally. . Normal jugular venous pulsation at 45°.  No THE ABDOMEN AND PELVIS WITH CONTRAST 2/1/2021 1:42 pm TECHNIQUE: CT of the abdomen and pelvis was performed with the administration of intravenous contrast. Multiplanar reformatted images are provided for review. Dose modulation, iterative reconstruction, and/or weight based adjustment of the mA/kV was utilized to reduce the radiation dose to as low as reasonably achievable. COMPARISON: CT abdomen pelvis December 15, 2020 and March 10, 2020 HISTORY: ORDERING SYSTEM PROVIDED HISTORY: GI bleeding, recent biliary stent placed TECHNOLOGIST PROVIDED HISTORY: Reason for exam:->GI bleeding, recent biliary stent placed Additional Contrast?->None Decision Support Exception->Emergency Medical Condition (MA) What reading provider will be dictating this exam?->CRC FINDINGS: Lung Bases: Compressive atelectasis seen in the posterior lung bases. Liver: No hepatic lesions identified. Bile ducts: Mild diffuse intrahepatic biliary ductal dilatation. Common bile duct stent identified. Hydropic gallbladder with sludge. No pericholecystic fat stranding. Pancreas: No pancreatic lesions. The pancreatic duct is not dilated. Adrenal glands: Normal in appearance. Kidneys: No evidence of hydronephrosis. No abnormal renal lesions. Spleen: No enhancing lesions. Bowel: Moderate to large hiatal hernia, unchanged. No evidence of bowel obstruction. Mildly prominent appendix, unchanged compared to March 2020. Peritoneum/Retroperitoneum: No abnormal pelvic lymphadenopathy. Pelvis: The prostate is enlarged measuring 5 cm in transverse axis. Multiple diverticula identified in the anterior and left posterior aspect of the bladder. Circumferential hyperattenuation around the left corpus cavernosum. Bones/Soft tissues: No aggressive osseous lesions. Mild-to-moderate intrahepatic biliary ductal dilatation suggest occluded metallic common bile duct stent. No change in multiple bladder diverticula. Moderate to large hiatal hernia is unchanged. Hyperattenuation is seen in the partially visualized left corpus cavernosum is nonspecific. Finding may represent hemorrhage versus contrast staining. Please correlate with physical exam for pain. Further evaluation may be obtained with penile ultrasound if clinically warranted. Fluoro For Surgical Procedures    Result Date: 2/3/2021  EXAMINATION: SPOT FLUOROSCOPIC IMAGES 2/3/2021 3:34 pm TECHNIQUE: Fluoroscopy was provided by the radiology department for procedure. Radiologist was not present during examination. FLUOROSCOPY DOSE AND TYPE OR TIME AND EXPOSURES: Fluoroscopy time equals 159 seconds. Total dose equals 75.83 mGy COMPARISON: None HISTORY: ORDERING SYSTEM PROVIDED HISTORY: ERCP TECHNOLOGIST PROVIDED HISTORY: Reason for exam:->ERCP What reading provider will be dictating this exam?->CRC Intraprocedural imaging. FINDINGS: 12 spot images of the right upper quadrant were obtained. Intraprocedural fluoroscopic spot images as above. See separate procedure report for more information. EKG: See Report  Echo: See Report      IMPRESSIONS:  Active Problems:    Acute GI bleeding  Resolved Problems:    * No resolved hospital problems. *      RECOMMENDATIONS:  Will attempt to obtain two-dimensional echocardiogram as his auscultatory findings suggest that of severe underlying aortic valve disease. Continue other medications. Additionally it is entirely possible that he may have bleeding related to underlying aortic stenosis but will await the results of his two-dimensional echocardiogram.    I have spent more than 25 minutes face to face with Enedina Velázquez and reviewing notes and laboratory data, with greater than 50% of this time instructing and counseling the patient and his wife who is at his side face to face regarding my findings and recommendations and I have answered all questions as posed to me by Mr. Stanton's wife.     Shahnaz Pizano,  FACP,FACC,FSCAI      NOTE:  This report was transcribed using voice recognition software.   Every effort was made to ensure accuracy; however, inadvertent computerized transcription errors may be present

## 2021-02-05 NOTE — PLAN OF CARE
Problem: Falls - Risk of:  Goal: Will remain free from falls  Description: Will remain free from falls  Outcome: Ongoing  Goal: Absence of physical injury  Description: Absence of physical injury  Outcome: Ongoing     Problem: Skin Integrity:  Goal: Will show no infection signs and symptoms  Description: Will show no infection signs and symptoms  Outcome: Ongoing     Problem: Bleeding:  Goal: Will show no signs and symptoms of excessive bleeding  Description: Will show no signs and symptoms of excessive bleeding  Outcome: Ongoing

## 2021-02-07 NOTE — DISCHARGE SUMMARY
Physician Discharge Summary     Patient ID:  Herve Saul  [id number]  89 y.o.  5/12/1919    Admit date: 2/1/2021    Discharge date and time: 2/5/2021  1:58 PM     Admission Diagnoses: Active Problems:    Acute GI bleeding  Resolved Problems:    * No resolved hospital problems. *      Discharge Diagnoses: Active Problems:    Acute GI bleeding  Resolved Problems:    * No resolved hospital problems. *      Condition at discharge : Stable    Consults: IP CONSULT TO GENERAL SURGERY  IP CONSULT TO GENERAL SURGERY  IP CONSULT TO CARDIOLOGY    Procedures: ERCP with stone extraction and replacement of biliary duct stent    Hospital Course: Patient is 8-year-old gentleman presented to the emergency room because of rectal bleeding. He does have a recent history of common bile duct stent. He was noted to have elevated liver function test.  Daughter reported chills, weakness. He was started on Zosyn for possible cholangitis. He was seen by general surgery and recommended ERCP. ERCP was performed. Stone extraction was performed. Replacement of biliary stent done. Surgery felt no further antibiotics were needed. Antibiotics were discontinued. Patient improved clinically after intervention. He was then discharged home with outpatient follow-up. Patient was also seen by cardiology for outpatient cholecystectomy preop clearance. They did obtain 2D echocardiogram.  FLUORO FOR SURGICAL PROCEDURES   Final Result   Intraprocedural fluoroscopic spot images as above. See separate procedure   report for more information. CT ABDOMEN PELVIS W IV CONTRAST Additional Contrast? None   Final Result   Mild-to-moderate intrahepatic biliary ductal dilatation suggest occluded   metallic common bile duct stent. No change in multiple bladder diverticula. Moderate to large hiatal hernia is unchanged. Hyperattenuation is seen in the partially visualized left corpus cavernosum   is nonspecific.   Finding may represent hemorrhage versus contrast staining. Please correlate with physical exam for pain. Further evaluation may be   obtained with penile ultrasound if clinically warranted.           Results for orders placed or performed during the hospital encounter of 02/01/21 (from the past 336 hour(s))   CBC Auto Differential    Collection Time: 02/01/21 10:25 AM   Result Value Ref Range    WBC 6.2 4.5 - 11.5 E9/L    RBC 3.73 (L) 3.80 - 5.80 E12/L    Hemoglobin 11.5 (L) 12.5 - 16.5 g/dL    Hematocrit 35.4 (L) 37.0 - 54.0 %    MCV 94.9 80.0 - 99.9 fL    MCH 30.8 26.0 - 35.0 pg    MCHC 32.5 32.0 - 34.5 %    RDW 15.6 (H) 11.5 - 15.0 fL    Platelets 531 530 - 035 E9/L    MPV 9.9 7.0 - 12.0 fL    Neutrophils % 63.1 43.0 - 80.0 %    Immature Granulocytes % 1.1 0.0 - 5.0 %    Lymphocytes % 19.0 (L) 20.0 - 42.0 %    Monocytes % 10.6 2.0 - 12.0 %    Eosinophils % 5.2 0.0 - 6.0 %    Basophils % 1.0 0.0 - 2.0 %    Neutrophils Absolute 3.89 1.80 - 7.30 E9/L    Immature Granulocytes # 0.07 E9/L    Lymphocytes Absolute 1.17 (L) 1.50 - 4.00 E9/L    Monocytes Absolute 0.65 0.10 - 0.95 E9/L    Eosinophils Absolute 0.32 0.05 - 0.50 E9/L    Basophils Absolute 0.06 0.00 - 0.20 E9/L   Lipase    Collection Time: 02/01/21 10:25 AM   Result Value Ref Range    Lipase 28 13 - 60 U/L   Lactate, Sepsis    Collection Time: 02/01/21 10:25 AM   Result Value Ref Range    Lactic Acid, Sepsis 2.9 (H) 0.5 - 1.9 mmol/L   TYPE AND SCREEN    Collection Time: 02/01/21 10:25 AM   Result Value Ref Range    ABO/Rh A POS     Antibody Screen NEG    SPECIMEN REJECTION    Collection Time: 02/01/21 11:27 AM   Result Value Ref Range    Rejected Test cmpx     Reason for Rejection see below    Comprehensive Metabolic Panel w/ Reflex to MG    Collection Time: 02/01/21 12:33 PM   Result Value Ref Range    Sodium 145 132 - 146 mmol/L    Potassium reflex Magnesium 4.2 3.5 - 5.0 mmol/L    Chloride 113 (H) 98 - 107 mmol/L    CO2 23 22 - 29 mmol/L    Anion Gap 9 7 - 16 mmol/L    Glucose 120 (H) 74 - 99 mg/dL    BUN 18 8 - 23 mg/dL    CREATININE 0.9 0.7 - 1.2 mg/dL    GFR Non-African American >60 >=60 mL/min/1.73    GFR African American >60     Calcium 8.2 (L) 8.6 - 10.2 mg/dL    Total Protein 5.8 (L) 6.4 - 8.3 g/dL    Albumin 3.1 (L) 3.5 - 5.2 g/dL    Total Bilirubin 2.9 (H) 0.0 - 1.2 mg/dL    Alkaline Phosphatase 472 (H) 40 - 129 U/L    ALT 69 (H) 0 - 40 U/L    AST 68 (H) 0 - 39 U/L   Troponin    Collection Time: 02/01/21 12:33 PM   Result Value Ref Range    Troponin <0.01 0.00 - 0.03 ng/mL   Brain Natriuretic Peptide    Collection Time: 02/01/21 12:33 PM   Result Value Ref Range    Pro-BNP 1,097 (H) 0 - 450 pg/mL   Lipase    Collection Time: 02/01/21 12:33 PM   Result Value Ref Range    Lipase 24 13 - 60 U/L   Lactate, Sepsis    Collection Time: 02/01/21  5:47 PM   Result Value Ref Range    Lactic Acid, Sepsis 0.7 0.5 - 1.9 mmol/L   Hepatic function panel    Collection Time: 02/01/21  5:47 PM   Result Value Ref Range    Total Protein 6.0 (L) 6.4 - 8.3 g/dL    Albumin 3.1 (L) 3.5 - 5.2 g/dL    Alkaline Phosphatase 495 (H) 40 - 129 U/L    ALT 73 (H) 0 - 40 U/L    AST 75 (H) 0 - 39 U/L    Total Bilirubin 3.6 (H) 0.0 - 1.2 mg/dL    Bilirubin, Direct 2.7 (H) 0.0 - 0.3 mg/dL    Bilirubin, Indirect 0.9 0.0 - 1.0 mg/dL   Urinalysis with Microscopic    Collection Time: 02/01/21  6:20 PM   Result Value Ref Range    Color, UA Yellow Straw/Yellow    Clarity, UA Clear Clear    Glucose, Ur Negative Negative mg/dL    Bilirubin Urine SMALL (A) Negative    Ketones, Urine Negative Negative mg/dL    Specific Gravity, UA 1.015 1.005 - 1.030    Blood, Urine Negative Negative    pH, UA 5.0 5.0 - 9.0    Protein, UA Negative Negative mg/dL    Urobilinogen, Urine 1.0 <2.0 E.U./dL    Nitrite, Urine Negative Negative    Leukocyte Esterase, Urine Negative Negative    WBC, UA NONE 0 - 5 /HPF    RBC, UA NONE 0 - 2 /HPF    Bacteria, UA NONE SEEN None Seen /HPF   Hemoglobin and hematocrit, blood    Collection Time: 02/01/21 11:05 PM   Result Value Ref Range    Hemoglobin 11.1 (L) 12.5 - 16.5 g/dL    Hematocrit 34.4 (L) 37.0 - 54.0 %   Comprehensive Metabolic Panel    Collection Time: 02/02/21  5:01 AM   Result Value Ref Range    Sodium 141 132 - 146 mmol/L    Potassium 4.0 3.5 - 5.0 mmol/L    Chloride 112 (H) 98 - 107 mmol/L    CO2 21 (L) 22 - 29 mmol/L    Anion Gap 8 7 - 16 mmol/L    Glucose 78 74 - 99 mg/dL    BUN 15 8 - 23 mg/dL    CREATININE 0.9 0.7 - 1.2 mg/dL    GFR Non-African American >60 >=60 mL/min/1.73    GFR African American >60     Calcium 7.7 (L) 8.6 - 10.2 mg/dL    Total Protein 5.6 (L) 6.4 - 8.3 g/dL    Albumin 2.8 (L) 3.5 - 5.2 g/dL    Total Bilirubin 2.8 (H) 0.0 - 1.2 mg/dL    Alkaline Phosphatase 419 (H) 40 - 129 U/L    ALT 62 (H) 0 - 40 U/L    AST 59 (H) 0 - 39 U/L   POCT Glucose    Collection Time: 02/02/21 11:56 AM   Result Value Ref Range    Meter Glucose 82 74 - 99 mg/dL   POCT Glucose    Collection Time: 02/03/21 10:21 AM   Result Value Ref Range    Meter Glucose 105 (H) 74 - 99 mg/dL   Culture, Blood 1    Collection Time: 02/03/21  1:14 PM    Specimen: Blood   Result Value Ref Range    Blood Culture, Routine 24 Hours no growth    Procalcitonin    Collection Time: 02/03/21  1:14 PM   Result Value Ref Range    Procalcitonin 0.26 (H) 0.00 - 0.08 ng/mL   COMPREHENSIVE METABOLIC PANEL    Collection Time: 02/04/21  1:14 PM   Result Value Ref Range    Sodium 142 132 - 146 mmol/L    Potassium 3.9 3.5 - 5.0 mmol/L    Chloride 112 (H) 98 - 107 mmol/L    CO2 16 (L) 22 - 29 mmol/L    Anion Gap 14 7 - 16 mmol/L    Glucose 92 74 - 99 mg/dL    BUN 16 8 - 23 mg/dL    CREATININE 0.8 0.7 - 1.2 mg/dL    GFR Non-African American >60 >=60 mL/min/1.73    GFR African American >60     Calcium 7.9 (L) 8.6 - 10.2 mg/dL    Total Protein 6.1 (L) 6.4 - 8.3 g/dL    Albumin 3.0 (L) 3.5 - 5.2 g/dL    Total Bilirubin 3.1 (H) 0.0 - 1.2 mg/dL    Alkaline Phosphatase 504 (H) 40 - 129 U/L    ALT 62 (H) 0 - 40 U/L    AST 70 (H) 0 - 39 U/L COMPREHENSIVE METABOLIC PANEL    Collection Time: 02/05/21 11:08 AM   Result Value Ref Range    Sodium 143 132 - 146 mmol/L    Potassium 3.5 3.5 - 5.0 mmol/L    Chloride 116 (H) 98 - 107 mmol/L    CO2 20 (L) 22 - 29 mmol/L    Anion Gap 7 7 - 16 mmol/L    Glucose 96 74 - 99 mg/dL    BUN 15 8 - 23 mg/dL    CREATININE 0.8 0.7 - 1.2 mg/dL    GFR Non-African American >60 >=60 mL/min/1.73    GFR African American >60     Calcium 7.6 (L) 8.6 - 10.2 mg/dL    Total Protein 5.3 (L) 6.4 - 8.3 g/dL    Albumin 2.7 (L) 3.5 - 5.2 g/dL    Total Bilirubin 1.5 (H) 0.0 - 1.2 mg/dL    Alkaline Phosphatase 342 (H) 40 - 129 U/L    ALT 39 0 - 40 U/L    AST 39 0 - 39 U/L         Discharge Exam:  See progress note from today    Disposition: Home    Patient Instructions:   Discharge Medication List as of 2/5/2021  1:12 PM      CONTINUE these medications which have NOT CHANGED    Details   ondansetron (ZOFRAN) 4 MG tablet Take 1 tablet by mouth 3 times daily as needed for Nausea or Vomiting, Disp-90 tablet, R-1Normal      vitamin E 1000 units capsule Take 1,000 Units by mouth dailyHistorical Med      bevacizumab (AVASTIN) 2.5 mg/0.1 ml syringe Inject 2.5 mg into the eye once Every 6 weeks in the left eyeHistorical Med      omeprazole (PRILOSEC) 20 MG capsule Take 40 mg by mouth Daily Historical Med      Multiple Vitamins-Minerals (MULTIVITAMIN PO) Take 1 tablet by mouth daily Historical Med      Multiple Vitamins-Minerals (PRESERVISION AREDS) CAPS Take 1 capsule by mouth dailyHistorical Med      carboxymethylcellulose (REFRESH CELLUVISC) 1 % ophthalmic solution Place 1 drop into both eyes 3 times dailyHistorical Med      Vitamin D (CHOLECALCIFEROL) 1000 UNITS CAPS capsule Take 1,000 Units by mouth 2 times dailyHistorical Med         STOP taking these medications       pravastatin (PRAVACHOL) 40 MG tablet Comments:   Reason for Stopping:               Activity: As tolerated    Diet: Cardiac    Follow-up with Genie Escalera MD  [address] [facility] [place]  [phone]    Schedule an appointment as soon as possible for a visit          Note that over 30 minutes was spent in preparing discharge papers, discussing discharge with patient, medication review, etc.    Signed:  Liv Walsh  2/7/2021  3:17 PM

## 2021-02-08 LAB — BLOOD CULTURE, ROUTINE: NORMAL

## 2021-02-16 ENCOUNTER — VIRTUAL VISIT (OUTPATIENT)
Dept: SURGERY | Age: 86
End: 2021-02-16

## 2021-02-16 DIAGNOSIS — K80.50 CHOLEDOCHOLITHIASIS: Primary | ICD-10-CM

## 2021-02-16 PROCEDURE — 99024 POSTOP FOLLOW-UP VISIT: CPT | Performed by: SURGERY

## 2021-02-17 ASSESSMENT — ENCOUNTER SYMPTOMS
COUGH: 0
WHEEZING: 0
ABDOMINAL PAIN: 1
VOMITING: 0
SHORTNESS OF BREATH: 0
NAUSEA: 1
RHINORRHEA: 0
CHEST TIGHTNESS: 0

## 2021-02-18 ENCOUNTER — TELEPHONE (OUTPATIENT)
Dept: SURGERY | Age: 86
End: 2021-02-18

## 2021-02-18 NOTE — TELEPHONE ENCOUNTER
Per the order of Dr. Areatha Gowers, patient has been scheduled for Lap julianna and ERCP with stent removal on 2.25.2021. Patients daughter provided with verbal instructions over the phone and informed that I will also mail instructions to her. Patient instructed to please contact our office with any questions. Patient scheduled for follow up appointment with Dr. Areatha Gowers after surgery. Surgery scheduling form faxed to [address] [place] [address] surgery scheduling and fax confirmation received. Dr. Areatha Gowers to enter orders.   Electronically signed by Ata Sandhu on 2/18/21 at 1:55 PM EST

## 2021-02-18 NOTE — TELEPHONE ENCOUNTER
Prior Authorization Form:      DEMOGRAPHICS:                     Patient Name:  Favio John  Patient :  [id number]            Insurance:  Payor: Judy Serve / Plan: Cheryal Plush PPO / Product Type: Medicare /   Insurance ID Number:    Payor/Plan Subscr  Sex Relation Sub. Ins. ID Effective Group Num   1.  Judy Serve* Alonso Nuñez 1919 Male Self [id number] 1/1/15 [id number]                                    [id number]         DIAGNOSIS & PROCEDURE:                       Procedure/Operation: Laparoscopic cholecystectomy and ERCP with stent removal           CPT Code: 12891 and 28292    Diagnosis:  Choledocholithiasis    ICD10 Code: K80.50    Location:  [address] [place] [address]    Surgeon:  Dawson Pham    SCHEDULING INFORMATION:                          Date: 21    Time: TBD              Anesthesia:  General                                                       Status:  Outpatient        Special Comments:         Electronically signed by Dominik Brasher on 2021 at 1:56 PM

## 2021-02-19 ENCOUNTER — HOSPITAL ENCOUNTER (OUTPATIENT)
Age: 86
Discharge: HOME | End: 2021-02-21
Payer: MEDICARE

## 2021-02-19 DIAGNOSIS — Z01.818 PREOP TESTING: ICD-10-CM

## 2021-02-19 PROCEDURE — U0003 INFECTIOUS AGENT DETECTION BY NUCLEIC ACID (DNA OR RNA); SEVERE ACUTE RESPIRATORY SYNDROME CORONAVIRUS 2 (SARS-COV-2) (CORONAVIRUS DISEASE [COVID-19]), AMPLIFIED PROBE TECHNIQUE, MAKING USE OF HIGH THROUGHPUT TECHNOLOGIES AS DESCRIBED BY CMS-2020-01-R: HCPCS

## 2021-02-19 RX ORDER — SODIUM CHLORIDE, SODIUM LACTATE, POTASSIUM CHLORIDE, CALCIUM CHLORIDE 600; 310; 30; 20 MG/100ML; MG/100ML; MG/100ML; MG/100ML
INJECTION, SOLUTION INTRAVENOUS CONTINUOUS
Status: CANCELLED | OUTPATIENT
Start: 2021-02-19

## 2021-02-19 NOTE — PROGRESS NOTES
Spoke with Dr Ermias Rojo. Reviewed PMH,  recent hospitalizations. Will repeat labs in PAT. No further testing, clearances needed.   Lawerance Vane  2/19/2021  2:05 PM

## 2021-02-21 LAB
SARS-COV-2: NOT DETECTED
SOURCE: NORMAL

## 2021-02-22 ENCOUNTER — HOSPITAL ENCOUNTER (OUTPATIENT)
Dept: PREADMISSION TESTING | Age: 86
Discharge: HOME | End: 2021-02-22
Payer: MEDICARE

## 2021-02-22 ENCOUNTER — ANESTHESIA EVENT (OUTPATIENT)
Dept: OPERATING ROOM | Age: 86
End: 2021-02-22
Payer: MEDICARE

## 2021-02-22 VITALS
TEMPERATURE: 97.9 F | HEIGHT: 66 IN | DIASTOLIC BLOOD PRESSURE: 61 MMHG | HEART RATE: 69 BPM | OXYGEN SATURATION: 97 % | RESPIRATION RATE: 16 BRPM | BODY MASS INDEX: 21.38 KG/M2 | SYSTOLIC BLOOD PRESSURE: 135 MMHG | WEIGHT: 133 LBS

## 2021-02-22 DIAGNOSIS — Z01.818 PRE-OP TESTING: Primary | ICD-10-CM

## 2021-02-22 LAB
ALBUMIN SERPL-MCNC: 3.6 G/DL (ref 3.5–5.2)
ALP BLD-CCNC: 125 U/L (ref 40–129)
ALT SERPL-CCNC: 10 U/L (ref 0–40)
ANION GAP SERPL CALCULATED.3IONS-SCNC: 9 MMOL/L (ref 7–16)
AST SERPL-CCNC: 21 U/L (ref 0–39)
BILIRUB SERPL-MCNC: 1 MG/DL (ref 0–1.2)
BUN BLDV-MCNC: 15 MG/DL (ref 8–23)
CALCIUM SERPL-MCNC: 9.1 MG/DL (ref 8.6–10.2)
CHLORIDE BLD-SCNC: 104 MMOL/L (ref 98–107)
CO2: 26 MMOL/L (ref 22–29)
CREAT SERPL-MCNC: 1 MG/DL (ref 0.7–1.2)
GFR AFRICAN AMERICAN: >60
GFR NON-AFRICAN AMERICAN: >60 ML/MIN/1.73
GLUCOSE BLD-MCNC: 106 MG/DL (ref 74–99)
HCT VFR BLD CALC: 36.5 % (ref 37–54)
HEMOGLOBIN: 11.6 G/DL (ref 12.5–16.5)
MCH RBC QN AUTO: 30.9 PG (ref 26–35)
MCHC RBC AUTO-ENTMCNC: 31.8 % (ref 32–34.5)
MCV RBC AUTO: 97.1 FL (ref 80–99.9)
PDW BLD-RTO: 15.9 FL (ref 11.5–15)
PLATELET # BLD: 312 E9/L (ref 130–450)
PMV BLD AUTO: 9.5 FL (ref 7–12)
POTASSIUM REFLEX MAGNESIUM: 4.6 MMOL/L (ref 3.5–5)
RBC # BLD: 3.76 E12/L (ref 3.8–5.8)
SODIUM BLD-SCNC: 139 MMOL/L (ref 132–146)
TOTAL PROTEIN: 6.6 G/DL (ref 6.4–8.3)
WBC # BLD: 6.1 E9/L (ref 4.5–11.5)

## 2021-02-22 PROCEDURE — 80053 COMPREHEN METABOLIC PANEL: CPT

## 2021-02-22 PROCEDURE — 36415 COLL VENOUS BLD VENIPUNCTURE: CPT

## 2021-02-22 PROCEDURE — 85027 COMPLETE CBC AUTOMATED: CPT

## 2021-02-22 NOTE — PROGRESS NOTES
[address]                                                                                                                     PRE OP INSTRUCTIONS FOR  Janine Barrientos        Date: 2/22/2021    Date and time of surgery: 2/25/21  Arrival Time: [facility] will call with time between 4:30-5. 1. Do not eat or drink anything after 12 midnight prior to surgery. This includes no water, chewing gum, mints or ice chips. 2. Take the following pills with a small sip of water on the morning of Surgery: None      3. Diabetics may take evening dose of insulin but none after midnight. If you feel symptomatic or low blood sugar take 1-2 ounces of apple juice only. 4. Aspirin, Ibuprofen, Advil, Naproxen, Vitamin E and other Anti-inflammatory products should be stopped  before surgery  as directed by your physician. 5. Check with your Doctor regarding stopping Plavix, Coumadin, Lovenox, Fragmin or other blood thinners. 6. Do not smoke,use illicit drugs and do not drink any alcoholic beverages 24 hours prior to surgery. 7. You may brush your teeth and gargle the morning of surgery. DO NOT SWALLOW WATER    8. You MUST make arrangements for a responsible adult to take you home after your surgery. You will not be allowed to leave alone or drive yourself home. It is strongly suggested someone stay with you the first 24 hrs. Your surgery will be cancelled if you do not have a ride home. 9. A parent/legal guardian must accompany a child scheduled for surgery and plan to stay at the hospital until the child is discharged. Please do not bring other children with you. 10. Please wear simple, loose fitting clothing to the hospital.  Jazmyn Roots not bring valuables (money, credit cards, checkbooks, etc.) Do not wear any makeup (including no eye makeup) or nail polish on your fingers or toes. 11. DO NOT wear any jewelry or piercings on day of surgery. All body piercing jewelry must be removed. 12.  Shower the night

## 2021-02-22 NOTE — ANESTHESIA PRE PROCEDURE
Department of Anesthesiology  Preprocedure Note       Name:  Enedina Velázquez   Age:  80 y.o.  :  1919                                          MRN:  [id number]         Date:  2021      Surgeon: Destinee Christianson):  MD Gray Rendon MD    Procedure: Procedure(s):  LAPAROSCOPIC CHOLECYSTECTOMY  ERCP ENDOSCOPIC RETROGRADE CHOLANGIOPANCREATOGRAPHY    Medications prior to admission:   Prior to Admission medications    Medication Sig Start Date End Date Taking?  Authorizing Provider   ondansetron (ZOFRAN) 4 MG tablet Take 1 tablet by mouth 3 times daily as needed for Nausea or Vomiting 20  Yes Gray Infante MD   vitamin E 1000 units capsule Take 1,000 Units by mouth daily   Yes Historical Provider, MD   omeprazole (PRILOSEC) 20 MG capsule Take 40 mg by mouth Daily    Yes Historical Provider, MD   Multiple Vitamins-Minerals (MULTIVITAMIN PO) Take 1 tablet by mouth daily    Yes Historical Provider, MD   carboxymethylcellulose (REFRESH CELLUVISC) 1 % ophthalmic solution Place 1 drop into both eyes 3 times daily   Yes Historical Provider, MD   Vitamin D (CHOLECALCIFEROL) 1000 UNITS CAPS capsule Take 1,000 Units by mouth 2 times daily   Yes Historical Provider, MD   bevacizumab (AVASTIN) 2.5 mg/0.1 ml syringe Inject 2.5 mg into the eye once Every 6 weeks in the left eye    Historical Provider, MD   Multiple Vitamins-Minerals (PRESERVISION AREDS) CAPS Take 1 capsule by mouth daily    Historical Provider, MD       Current medications:    Current Outpatient Medications   Medication Sig Dispense Refill    ondansetron (ZOFRAN) 4 MG tablet Take 1 tablet by mouth 3 times daily as needed for Nausea or Vomiting 90 tablet 1    vitamin E 1000 units capsule Take 1,000 Units by mouth daily      omeprazole (PRILOSEC) 20 MG capsule Take 40 mg by mouth Daily       Multiple Vitamins-Minerals (MULTIVITAMIN PO) Take 1 tablet by mouth daily       carboxymethylcellulose (REFRESH CELLUVISC) 1 % ophthalmic solution Place 1 drop into both eyes 3 times daily      Vitamin D (CHOLECALCIFEROL) 1000 UNITS CAPS capsule Take 1,000 Units by mouth 2 times daily      bevacizumab (AVASTIN) 2.5 mg/0.1 ml syringe Inject 2.5 mg into the eye once Every 6 weeks in the left eye      Multiple Vitamins-Minerals (PRESERVISION AREDS) CAPS Take 1 capsule by mouth daily       No current facility-administered medications for this encounter. 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 Mauro Tripp.     Cancer (HCC)     melanoma x3    Cellulitis     Dizziness     Easy bruising     GERD (gastroesophageal reflux disease)     H/O emphysema     Heart murmur     Heartburn     History of stress test     Lightheadedness     Macular degeneration     Passed out     [facility]roke [facility])     Unspecified cerebral artery occlusion with cerebral infarction        Past Surgical History:        Procedure Laterality Date    CATARACT REMOVAL      ENDOSCOPY, COLON, DIAGNOSTIC      ERCP N/A 3/11/2020    ERCP STONE REMOVAL performed by Jose Manuel Hernandez MD at [address] ERCP  3/11/2020    ERCP SPHINCTER/PAPILLOTOMY performed by Jose Manuel Hernandez MD at [address] ERCP N/A 2020    ERCP STENT REMOVAL/EXCHANGE performed by Jose Manuel Hernandez MD at [address] ERCP N/A 2020 ERCP STONE REMOVAL performed by Wilson Chaidez MD at [address] [facility] ERCP N/A 2/3/2021    ERCP STONE REMOVAL performed by Wilson Chaidez MD at [address] ERCP N/A 2/3/2021    ERCP STENT REMOVAL/EXCHANGE performed by Wilson Chaidez MD at [address] ERCP N/A 2/3/2021    ERCP DILATION BALLOON performed by Wilson Chaidez MD at [address] [facility]  2013    Left hip     HEMORRHOID SURGERY      JOINT REPLACEMENT      left hip    TONSILLECTOMY AND ADENOIDECTOMY      UPPER GASTROINTESTINAL ENDOSCOPY         Social History:    Social History     Tobacco Use    Smoking status: Never Smoker    Smokeless tobacco: Never Used    Tobacco comment: quit smoking cigars a very long time ago   Substance Use Topics    Alcohol use: No                                Counseling given: Not Answered  Comment: quit smoking cigars a very long time ago      Vital Signs (Current):   Vitals:    02/22/21 1201   BP: 135/61   Pulse: 69   Resp: 16   Temp: 97.9 °F (36.6 °C)   TempSrc: Temporal   SpO2: 97%   Weight: 133 lb (60.3 kg)   Height: 5' 6\" (1.676 m)                                              BP Readings from Last 3 Encounters:   02/22/21 135/61   02/05/21 123/70   02/03/21 119/70       NPO Status:                                                                                 BMI:   Wt Readings from Last 3 Encounters:   02/22/21 133 lb (60.3 kg)   12/11/20 144 lb (65.3 kg)   12/08/20 144 lb (65.3 kg)     Body mass index is 21.47 kg/m².     CBC:   Lab Results   Component Value Date    WBC 6.2 02/01/2021    RBC 3.73 02/01/2021    HGB 11.1 02/01/2021    HCT 34.4 02/01/2021    MCV 94.9 02/01/2021    RDW 15.6 02/01/2021     02/01/2021       CMP:   Lab Results   Component Value Date     02/05/2021    K 3.5 02/05/2021    K 4.2 02/01/2021     02/05/2021    CO2 20 02/05/2021    BUN 15 02/05/2021    CREATININE 0.8 02/05/2021    GFRAA >60 02/05/2021    LABGLOM >60 02/05/2021    GLUCOSE 96 02/05/2021    GLUCOSE 90 08/03/2011    PROT 5.3 02/05/2021    CALCIUM 7.6 02/05/2021    BILITOT 1.5 02/05/2021    ALKPHOS 342 02/05/2021    AST 39 02/05/2021    ALT 39 02/05/2021       POC Tests: No results for input(s): POCGLU, POCNA, POCK, POCCL, POCBUN, POCHEMO, POCHCT in the last 72 hours. Coags:   Lab Results   Component Value Date    PROTIME 13.1 03/10/2020    INR 1.2 03/10/2020    APTT 25.8 11/22/2015       HCG (If Applicable): No results found for: PREGTESTUR, PREGSERUM, HCG, HCGQUANT     ABGs: No results found for: PHART, PO2ART, OOL0JOB, KRD2ODI, BEART, N3XWEITZ     Type & Screen (If Applicable):  No results found for: LABABO, LABRH    Drug/Infectious Status (If Applicable):  No results found for: HIV, HEPCAB    COVID-19 Screening (If Applicable):   Lab Results   Component Value Date    COVID19 Not Detected 02/19/2021    COVID19 Not Detected 12/08/2020         Anesthesia Evaluation  Patient summary reviewed  Airway: Mallampati: III  TM distance: >3 FB   Neck ROM: full  Mouth opening: > = 3 FB Dental:          Pulmonary:Negative Pulmonary ROS breath sounds clear to auscultation  (+) COPD:                             Cardiovascular:Negative CV ROS    (+) valvular problems/murmurs: AS, hyperlipidemia      ECG reviewed  Rhythm: regular  Rate: normal  Echocardiogram reviewed    Cleared by cardiology              Neuro/Psych:   (+) CVA:,             GI/Hepatic/Renal:   (+) GERD:,           Endo/Other:    (+) : arthritis:., malignancy/cancer. Abdominal:       Abdomen: soft. Vascular:                                        Anesthesia Plan      general     ASA 4     (PT HAS HAD SEVERAL ERCP'S IN [facility] . SEPTEMBER,DECEMBER AND FEBRUARY AND HAS DONE WELL NOW HAS SLUDGE AND STONES RECURRING. IS DNR NO CPR . PT HAD COVID INJECTIONS DID WELL. IS ACTIVE SHOPS ANS WALKS WITHOUT PROBLEMS.)  Induction: intravenous. Anesthetic plan and risks discussed with patient.       Plan discussed with

## 2021-02-23 NOTE — PROGRESS NOTES
Patient was seen in PAT by anesthesia, patient, POA and anesthesia are prepared to move forward with surgery.

## 2021-02-25 ENCOUNTER — HOSPITAL ENCOUNTER (OUTPATIENT)
Dept: GENERAL RADIOLOGY | Age: 86
Discharge: HOME | End: 2021-02-27
Payer: MEDICARE

## 2021-02-25 ENCOUNTER — HOSPITAL ENCOUNTER (OUTPATIENT)
Age: 86
Setting detail: OUTPATIENT SURGERY
Discharge: HOME | End: 2021-02-25
Attending: SURGERY | Admitting: SURGERY
Payer: MEDICARE

## 2021-02-25 ENCOUNTER — ANESTHESIA (OUTPATIENT)
Dept: OPERATING ROOM | Age: 86
End: 2021-02-25
Payer: MEDICARE

## 2021-02-25 VITALS
OXYGEN SATURATION: 96 % | HEIGHT: 68 IN | RESPIRATION RATE: 18 BRPM | DIASTOLIC BLOOD PRESSURE: 67 MMHG | TEMPERATURE: 97.8 F | WEIGHT: 139 LBS | BODY MASS INDEX: 21.07 KG/M2 | SYSTOLIC BLOOD PRESSURE: 151 MMHG | HEART RATE: 73 BPM

## 2021-02-25 VITALS
TEMPERATURE: 95.9 F | RESPIRATION RATE: 2 BRPM | SYSTOLIC BLOOD PRESSURE: 146 MMHG | DIASTOLIC BLOOD PRESSURE: 84 MMHG | OXYGEN SATURATION: 100 %

## 2021-02-25 DIAGNOSIS — Z01.818 PREOP TESTING: Primary | ICD-10-CM

## 2021-02-25 DIAGNOSIS — K80.50 CHOLEDOCHOLITHIASIS: ICD-10-CM

## 2021-02-25 DIAGNOSIS — K81.1 CHRONIC CHOLECYSTITIS: ICD-10-CM

## 2021-02-25 PROCEDURE — 6360000002 HC RX W HCPCS: Performed by: NURSE ANESTHETIST, CERTIFIED REGISTERED

## 2021-02-25 PROCEDURE — 7100000001 HC PACU RECOVERY - ADDTL 15 MIN: Performed by: SURGERY

## 2021-02-25 PROCEDURE — 2720000010 HC SURG SUPPLY STERILE: Performed by: SURGERY

## 2021-02-25 PROCEDURE — 7100000000 HC PACU RECOVERY - FIRST 15 MIN: Performed by: SURGERY

## 2021-02-25 PROCEDURE — 6360000002 HC RX W HCPCS: Performed by: SURGERY

## 2021-02-25 PROCEDURE — 7100000010 HC PHASE II RECOVERY - FIRST 15 MIN: Performed by: SURGERY

## 2021-02-25 PROCEDURE — 2500000003 HC RX 250 WO HCPCS: Performed by: NURSE ANESTHETIST, CERTIFIED REGISTERED

## 2021-02-25 PROCEDURE — 88304 TISSUE EXAM BY PATHOLOGIST: CPT

## 2021-02-25 PROCEDURE — 43275 ERCP REMOVE FORGN BODY DUCT: CPT | Performed by: SURGERY

## 2021-02-25 PROCEDURE — 3600000004 HC SURGERY LEVEL 4 BASE: Performed by: SURGERY

## 2021-02-25 PROCEDURE — 2580000003 HC RX 258: Performed by: ANESTHESIOLOGY

## 2021-02-25 PROCEDURE — 6360000004 HC RX CONTRAST MEDICATION: Performed by: SURGERY

## 2021-02-25 PROCEDURE — 74330 X-RAY BILE/PANC ENDOSCOPY: CPT

## 2021-02-25 PROCEDURE — C1769 GUIDE WIRE: HCPCS | Performed by: SURGERY

## 2021-02-25 PROCEDURE — 43264 ERCP REMOVE DUCT CALCULI: CPT | Performed by: SURGERY

## 2021-02-25 PROCEDURE — 3600000014 HC SURGERY LEVEL 4 ADDTL 15MIN: Performed by: SURGERY

## 2021-02-25 PROCEDURE — 47562 LAPAROSCOPIC CHOLECYSTECTOMY: CPT | Performed by: SURGERY

## 2021-02-25 PROCEDURE — 3700000001 HC ADD 15 MINUTES (ANESTHESIA): Performed by: SURGERY

## 2021-02-25 PROCEDURE — 2500000003 HC RX 250 WO HCPCS: Performed by: SURGERY

## 2021-02-25 PROCEDURE — 3700000000 HC ANESTHESIA ATTENDED CARE: Performed by: SURGERY

## 2021-02-25 PROCEDURE — 2709999900 HC NON-CHARGEABLE SUPPLY: Performed by: SURGERY

## 2021-02-25 PROCEDURE — 7100000011 HC PHASE II RECOVERY - ADDTL 15 MIN: Performed by: SURGERY

## 2021-02-25 RX ORDER — TRAMADOL HYDROCHLORIDE 50 MG/1
25 TABLET ORAL EVERY 6 HOURS PRN
Qty: 12 TABLET | Refills: 0 | Status: SHIPPED | OUTPATIENT
Start: 2021-02-25 | End: 2021-02-28

## 2021-02-25 RX ORDER — MEPERIDINE HYDROCHLORIDE 25 MG/ML
5 INJECTION INTRAMUSCULAR; INTRAVENOUS; SUBCUTANEOUS EVERY 5 MIN PRN
Status: DISCONTINUED | OUTPATIENT
Start: 2021-02-25 | End: 2021-02-25 | Stop reason: HOSPADM

## 2021-02-25 RX ORDER — BUPIVACAINE HYDROCHLORIDE AND EPINEPHRINE 2.5; 5 MG/ML; UG/ML
INJECTION, SOLUTION EPIDURAL; INFILTRATION; INTRACAUDAL; PERINEURAL PRN
Status: DISCONTINUED | OUTPATIENT
Start: 2021-02-25 | End: 2021-02-25 | Stop reason: ALTCHOICE

## 2021-02-25 RX ORDER — FENTANYL CITRATE 50 UG/ML
25 INJECTION, SOLUTION INTRAMUSCULAR; INTRAVENOUS EVERY 5 MIN PRN
Status: DISCONTINUED | OUTPATIENT
Start: 2021-02-25 | End: 2021-02-25 | Stop reason: HOSPADM

## 2021-02-25 RX ORDER — FENTANYL CITRATE 50 UG/ML
12.5 INJECTION, SOLUTION INTRAMUSCULAR; INTRAVENOUS EVERY 5 MIN PRN
Status: DISCONTINUED | OUTPATIENT
Start: 2021-02-25 | End: 2021-02-25 | Stop reason: HOSPADM

## 2021-02-25 RX ORDER — ONDANSETRON 2 MG/ML
INJECTION INTRAMUSCULAR; INTRAVENOUS PRN
Status: DISCONTINUED | OUTPATIENT
Start: 2021-02-25 | End: 2021-02-25 | Stop reason: SDUPTHER

## 2021-02-25 RX ORDER — SODIUM CHLORIDE 0.9 % (FLUSH) 0.9 %
10 SYRINGE (ML) INJECTION PRN
Status: DISCONTINUED | OUTPATIENT
Start: 2021-02-25 | End: 2021-02-25 | Stop reason: HOSPADM

## 2021-02-25 RX ORDER — FENTANYL CITRATE 50 UG/ML
INJECTION, SOLUTION INTRAMUSCULAR; INTRAVENOUS PRN
Status: DISCONTINUED | OUTPATIENT
Start: 2021-02-25 | End: 2021-02-25 | Stop reason: SDUPTHER

## 2021-02-25 RX ORDER — LIDOCAINE HYDROCHLORIDE 20 MG/ML
INJECTION, SOLUTION INTRAVENOUS PRN
Status: DISCONTINUED | OUTPATIENT
Start: 2021-02-25 | End: 2021-02-25 | Stop reason: SDUPTHER

## 2021-02-25 RX ORDER — CIPROFLOXACIN 2 MG/ML
400 INJECTION, SOLUTION INTRAVENOUS ONCE
Status: COMPLETED | OUTPATIENT
Start: 2021-02-25 | End: 2021-02-25

## 2021-02-25 RX ORDER — EPHEDRINE SULFATE/0.9% NACL/PF 50 MG/5 ML
SYRINGE (ML) INTRAVENOUS PRN
Status: DISCONTINUED | OUTPATIENT
Start: 2021-02-25 | End: 2021-02-25 | Stop reason: SDUPTHER

## 2021-02-25 RX ORDER — SODIUM CHLORIDE, SODIUM LACTATE, POTASSIUM CHLORIDE, CALCIUM CHLORIDE 600; 310; 30; 20 MG/100ML; MG/100ML; MG/100ML; MG/100ML
INJECTION, SOLUTION INTRAVENOUS CONTINUOUS
Status: DISCONTINUED | OUTPATIENT
Start: 2021-02-25 | End: 2021-02-25 | Stop reason: HOSPADM

## 2021-02-25 RX ORDER — NEOSTIGMINE METHYLSULFATE 1 MG/ML
INJECTION, SOLUTION INTRAVENOUS PRN
Status: DISCONTINUED | OUTPATIENT
Start: 2021-02-25 | End: 2021-02-25 | Stop reason: SDUPTHER

## 2021-02-25 RX ORDER — GLYCOPYRROLATE 1 MG/5 ML
SYRINGE (ML) INTRAVENOUS PRN
Status: DISCONTINUED | OUTPATIENT
Start: 2021-02-25 | End: 2021-02-25 | Stop reason: SDUPTHER

## 2021-02-25 RX ORDER — ROCURONIUM BROMIDE 10 MG/ML
INJECTION, SOLUTION INTRAVENOUS PRN
Status: DISCONTINUED | OUTPATIENT
Start: 2021-02-25 | End: 2021-02-25 | Stop reason: SDUPTHER

## 2021-02-25 RX ORDER — MORPHINE SULFATE 2 MG/ML
0.5 INJECTION, SOLUTION INTRAMUSCULAR; INTRAVENOUS EVERY 5 MIN PRN
Status: DISCONTINUED | OUTPATIENT
Start: 2021-02-25 | End: 2021-02-25 | Stop reason: HOSPADM

## 2021-02-25 RX ORDER — ETOMIDATE 2 MG/ML
INJECTION INTRAVENOUS PRN
Status: DISCONTINUED | OUTPATIENT
Start: 2021-02-25 | End: 2021-02-25 | Stop reason: SDUPTHER

## 2021-02-25 RX ORDER — SODIUM CHLORIDE 0.9 % (FLUSH) 0.9 %
10 SYRINGE (ML) INJECTION EVERY 12 HOURS SCHEDULED
Status: DISCONTINUED | OUTPATIENT
Start: 2021-02-25 | End: 2021-02-25 | Stop reason: HOSPADM

## 2021-02-25 RX ADMIN — ETOMIDATE 5 MG: 2 INJECTION, SOLUTION INTRAVENOUS at 12:23

## 2021-02-25 RX ADMIN — SODIUM CHLORIDE, POTASSIUM CHLORIDE, SODIUM LACTATE AND CALCIUM CHLORIDE: 600; 310; 30; 20 INJECTION, SOLUTION INTRAVENOUS at 11:49

## 2021-02-25 RX ADMIN — FENTANYL CITRATE 50 MCG: 50 INJECTION, SOLUTION INTRAMUSCULAR; INTRAVENOUS at 11:54

## 2021-02-25 RX ADMIN — FENTANYL CITRATE 50 MCG: 50 INJECTION, SOLUTION INTRAMUSCULAR; INTRAVENOUS at 12:25

## 2021-02-25 RX ADMIN — LIDOCAINE HYDROCHLORIDE 50 MG: 20 INJECTION, SOLUTION INTRAVENOUS at 11:54

## 2021-02-25 RX ADMIN — ROCURONIUM BROMIDE 20 MG: 10 SOLUTION INTRAVENOUS at 12:16

## 2021-02-25 RX ADMIN — Medication 10 MG: at 12:10

## 2021-02-25 RX ADMIN — ETOMIDATE 15 MG: 2 INJECTION, SOLUTION INTRAVENOUS at 11:54

## 2021-02-25 RX ADMIN — Medication 3 MG: at 12:53

## 2021-02-25 RX ADMIN — ONDANSETRON 4 MG: 2 INJECTION INTRAMUSCULAR; INTRAVENOUS at 12:45

## 2021-02-25 RX ADMIN — ROCURONIUM BROMIDE 30 MG: 10 SOLUTION INTRAVENOUS at 11:54

## 2021-02-25 RX ADMIN — Medication 0.6 MG: at 12:53

## 2021-02-25 RX ADMIN — Medication 10 MG: at 12:16

## 2021-02-25 RX ADMIN — SODIUM CHLORIDE, POTASSIUM CHLORIDE, SODIUM LACTATE AND CALCIUM CHLORIDE: 600; 310; 30; 20 INJECTION, SOLUTION INTRAVENOUS at 10:25

## 2021-02-25 RX ADMIN — FENTANYL CITRATE 50 MCG: 50 INJECTION, SOLUTION INTRAMUSCULAR; INTRAVENOUS at 12:20

## 2021-02-25 RX ADMIN — Medication 10 MG: at 12:02

## 2021-02-25 RX ADMIN — CIPROFLOXACIN 400 MG: 2 INJECTION INTRAVENOUS at 11:56

## 2021-02-25 ASSESSMENT — PULMONARY FUNCTION TESTS
PIF_VALUE: 30
PIF_VALUE: 12
PIF_VALUE: 14
PIF_VALUE: 21
PIF_VALUE: 22
PIF_VALUE: 17
PIF_VALUE: 15
PIF_VALUE: 20
PIF_VALUE: 4
PIF_VALUE: 2
PIF_VALUE: 21
PIF_VALUE: 20
PIF_VALUE: 14
PIF_VALUE: 3
PIF_VALUE: 14
PIF_VALUE: 2
PIF_VALUE: 21
PIF_VALUE: 0
PIF_VALUE: 20
PIF_VALUE: 0
PIF_VALUE: 21
PIF_VALUE: 12
PIF_VALUE: 21
PIF_VALUE: 14
PIF_VALUE: 14
PIF_VALUE: 5
PIF_VALUE: 14
PIF_VALUE: 1
PIF_VALUE: 20
PIF_VALUE: 0
PIF_VALUE: 15
PIF_VALUE: 18
PIF_VALUE: 2
PIF_VALUE: 14
PIF_VALUE: 20
PIF_VALUE: 12
PIF_VALUE: 0
PIF_VALUE: 12
PIF_VALUE: 1
PIF_VALUE: 21
PIF_VALUE: 20

## 2021-02-25 ASSESSMENT — PAIN SCALES - GENERAL
PAINLEVEL_OUTOF10: 0

## 2021-02-25 NOTE — OP NOTE
DATE OF PROCEDURE:  2/25/2021      SURGEON: Dawson Pham MD      ASSISTANT:  none      PREOPERATIVE DIAGNOSIS: Choledocholithiasis. POSTOPERATIVE DIAGNOSIS: Chronic cholecystitis      OPERATION:  Laparoscopic cholecystectomy. ANESTHESIA:  General.      ESTIMATED BLOOD LOSS:minimal      COMPLICATIONS:  None. FLUIDS:  Crystalloid. SPECIMEN:  Gallbladder. DISPOSITION:  To home. INDICATION:  Favio John is a 80 y.o. male who presented     for evaluation of right upper quadrant abdominal pain consistent with  Chronic cholecystitis and choledocholithiasis. We explained the risks, benefits, potential outcomes, and   alternatives of treatment to the aforementioned procedure, including risk of   potential biliary leak or bile duct injury requiring further surgeries, infection or   bleeding requiring procedure or surgeries. He agreed to proceed   understanding those risks and potential outcomes. PROCEDURE:  The patient was brought into the operative suite and was given   preoperative antibiotics 30 minutes before incision, was placed under general   anesthesia, and then was prepped and draped in normal sterile condition. Once this was done, a 5-mm incision was made supraumbilically and a Veress   needle was passed through this incision into the peritoneum. Once this was done, CO2 was used to insufflate the abdomen to a pressure of 15 mmHg. At that time, the Veress needle was removed and replaced with a 5-mm trocar. The laparoscope was placed through that trocar and port, and once this was done, under direct visualization with   the laparoscope, an additional  10-mm port was placed in the subxiphoid area. Two right lateral abdominal ports were placed, 5 mm in size, under direct   visualization with the laparoscope. Once this was done, the gallbladder was retracted cephaladly with blunt   graspers.   Blunt dissection as well as electrocautery were able to free the

## 2021-02-25 NOTE — ANESTHESIA POSTPROCEDURE EVALUATION
Department of Anesthesiology  Postprocedure Note    Patient: Erick Stallworth  MRN: [id number]  YOB: 1919  Date of evaluation: 2/25/2021  Time:  1:28 PM     Procedure Summary     Date: 02/25/21 Room / Location: [address] [place] [address] / [address] [place] [address]    Anesthesia Start: 9802 Anesthesia Stop: 1308    Procedures:       LAPAROSCOPIC CHOLECYSTECTOMY (N/A Abdomen)      ERCP ENDOSCOPIC RETROGRADE CHOLANGIOPANCREATOGRAPHY (N/A Abdomen) Diagnosis: (CHOLEDOCHOLITHIASIS)    Surgeons: Bassem Presley MD Responsible Provider: Javan Parrish MD    Anesthesia Type: general ASA Status: 4          Anesthesia Type: general    Evelyn Phase I: Evelyn Score: 8    Evelyn Phase II:      Last vitals: Reviewed and per EMR flowsheets.        Anesthesia Post Evaluation    Patient location during evaluation: PACU  Patient participation: complete - patient participated  Level of consciousness: awake  Pain score: 2  Airway patency: patent  Nausea & Vomiting: no nausea  Complications: no  Cardiovascular status: blood pressure returned to baseline  Respiratory status: acceptable  Hydration status: euvolemic

## 2021-02-25 NOTE — PROGRESS NOTES
Reviewed discharge instructions with patients daughter/POA. Pt will be due to void by 10pm tonight. Instructed POA to bring pt to ED is pt unable to void by 10pm.    All questions answered and POA verbalized understanding. Pt. Discharged without issue.

## 2021-02-25 NOTE — H&P
General Surgery History and Physical  [facility]    Patient's Name/Date of Birth: Erick Stallworth / 5/12/1919    Date: February 25, 2021     Surgeon: Bassem Presley MD    PCP: Becky Harding MD     Chief Complaint: abdominal pain, cholelithiasis, choledocholithiasis    HPI:   Erick Stallworth is a 80 y.o. male who presents for lap julianna. He was diagnosed with cholecystitis and choledocholithiasis one year ago. At that time, he had ercp with stone extraction and stent placement. Decision was made to forego lap julianna at that time due to patient's age. Over the past year, he had required 3 ERCPs for biliary obstruction. The last episode was three weeks ago when he was admitted with cholangitis. After further discussion with the patient and his daughter, decision was made to purse cholecystectomy. He was evaluation by cardiology for his history of aortic stenosis and had echo. He was deemed an acceptable candidate for surgery. Patient Active Problem List   Diagnosis    Aortic stenosis, moderate    Acute blood loss anemia    Gastrointestinal hemorrhage    Gastroesophageal reflux disease with esophagitis    Vertebral artery occlusion    Dizziness    Moderate aortic stenosis by prior echocardiogram    1st degree AV block    History of biliary duct stent placement    Choledocholithiasis    Pancreatic cyst    Acute GI bleeding       Past Medical History:   Diagnosis Date    Anemia     Aortic stenosis     Arthritis     Cm's esophagus with esophagitis     Scope every 2 years with Dr. Dallas Landau.     Cancer (HCC)     melanoma x3    Cellulitis     Dizziness     Easy bruising     GERD (gastroesophageal reflux disease)     H/O emphysema     Heart murmur     Heartburn     History of stress test     Lightheadedness     Macular degeneration     Passed out     Stroke [facility])     Unspecified cerebral artery occlusion with cerebral infarction        Past Surgical History:   Procedure Laterality Date    CATARACT REMOVAL      ENDOSCOPY, COLON, DIAGNOSTIC      ERCP N/A 3/11/2020    ERCP STONE REMOVAL performed by Lupe Healy MD at [address] [facility] ERCP  3/11/2020    ERCP SPHINCTER/PAPILLOTOMY performed by Lupe Healy MD at [address] [facility] ERCP N/A 12/11/2020    ERCP STENT REMOVAL/EXCHANGE performed by Lupe Healy MD at [address] [facility] ERCP N/A 12/11/2020    ERCP STONE REMOVAL performed by Lupe Healy MD at [address] [facility] ERCP N/A 2/3/2021    ERCP STONE REMOVAL performed by Lupe Healy MD at [address] [facility] ERCP N/A 2/3/2021    ERCP STENT REMOVAL/EXCHANGE performed by Lupe Healy MD at [address] [facility] ERCP N/A 2/3/2021    ERCP DILATION BALLOON performed by Lupe Healy MD at [address] [facility]  2013    Left hip     HEMORRHOID SURGERY      JOINT REPLACEMENT      left hip    TONSILLECTOMY AND ADENOIDECTOMY      UPPER GASTROINTESTINAL ENDOSCOPY         No Known Allergies    The patient has a family history that is negative for severe cardiovascular or respiratory issues, negative for reaction to anesthesia. Time spent reviewing past medical, surgical, social and family history, vitals, nursing assessment and images. No changes from above documented history.     Social History     Socioeconomic History    Marital status:      Spouse name: linn    Number of children: 3    Years of education: 6    Highest education level: Not on file   Occupational History    Not on file   Social Needs    Financial resource strain: Not on file    Food insecurity     Worry: Not on file     Inability: Not on file   [employer] needs     Medical: Not on file     Non-medical: Not on file   Tobacco Use    Smoking status: Never Smoker    Smokeless tobacco: Never Used    Tobacco comment: quit smoking cigars a very long time ago   Substance and Sexual Activity    Alcohol use: No    Drug use: No    Sexual activity: Not on file Lifestyle    Physical activity     Days per week: Not on file     Minutes per session: Not on file    Stress: Not on file   Relationships    Social connections     Talks on phone: Not on file     Gets together: Not on file     Attends [employer] service: Not on file     Active member of club or organization: Not on file     Attends meetings of clubs or organizations: Not on file     Relationship status: Not on file    Intimate partner violence     Fear of current or ex partner: Not on file     Emotionally abused: Not on file     Physically abused: Not on file     Forced sexual activity: Not on file   Other Topics Concern    Not on file   Social History Narrative    Not on file       I have reviewed relevant labs from this admission and interpretation is included in my assessment and plan    Review of Systems    A complete 10 system review was performed and are otherwise negative unless mentioned in the above HPI. Specific negatives are listed below but may not include all those reviewed. General ROS: negative obtundation, AMS  ENT ROS: negative rhinorrhea, epistaxis  Allergy and Immunology ROS: negative itchy/watery eyes or nasal congestion  Hematological and Lymphatic ROS: negative spontaneous bleeding or bruising  Endocrine ROS: negative  lethargy, mood swings, palpitations or polydipsia/polyuria  Respiratory ROS: negative sputum changes, stridor, tachypnea or wheezing  Cardiovascular ROS: negative for - loss of consciousness, murmur or orthopnea  Gastrointestinal ROS: negative for - hematochezia or hematemesis  Genito-Urinary ROS: negative for -  genital discharge or hematuria  Musculoskeletal ROS: negative for - focal weakness, gangrene  Psych/Neuro ROS: negative for - visual or auditory hallucinations, suicidal ideation    Physical exam:   There were no vitals taken for this visit.   General appearance:  NAD, appears stated age  Head: NCAT, PERRLA, EOMI, red conjunctiva  Neck: supple, no masses, trachea midline  Lungs: Equal chest rise bilateral, no retractions, no wheezing  Heart: Reg rate  Abdomen: soft, nontender, nondistended  Skin; warm and dry, no cyanosis  Gu: no cva tenderness  Extremities: atraumatic, no focal motor deficits, no open wounds  Psych: No tremor, visual hallucinations      Radiology: n/a    Assessment:  Janine Barrientos is a 80 y.o. male with chronic cholecystitis, choledocholithiasis with biliary stent in place  Patient Active Problem List   Diagnosis    Aortic stenosis, moderate    Acute blood loss anemia    Gastrointestinal hemorrhage    Gastroesophageal reflux disease with esophagitis    Vertebral artery occlusion    Dizziness    Moderate aortic stenosis by prior echocardiogram    1st degree AV block    History of biliary duct stent placement    Choledocholithiasis    Pancreatic cyst    Acute GI bleeding         Plan:  Proceed with laparoscopic cholecystectomy and ERCP with stent removal  Patient is DNR CC and status was rescinded by patient and daughter (POA) prior to proedure  -The procedure, risks, benefits and alternatives were discussed with patient. he   agrees to proceed.           Wendy Uriostegui MD  6:02 AM  2/25/2021

## 2021-02-25 NOTE — OP NOTE
SURGEON: Carroll Armijo MD        PREOPERATIVE DIAGNOSIS: Choledocholithiasis      POSTOPERATIVE DIAGNOSES:  1. Choledocholithiasis  2. Biliary stricture      OPERATION:  1. ERCP stone extraction  2. ERCP stent removal    ANESTHESIA: LMAC       ESTIMATED BLOOD LOSS: Minimal.         INDICATION: This is a 80 y.o. male with  history of choledocholithiasis. He has had multiple ERCPs with stent placement. He presented today for cholecystectomy as well as ERCP with stent removal. The patient agreed to undergo the ERCP. The risks, benefits, and alternatives were explained to the patient for the procedure including bleeding, infection, perforation, and pancreatitis. The patient understood and agreed to proceed. DESCRIPTION OF PROCEDURE: The patient was brought to the operating room and he underwent [facility] anesthesia by the anesthesia team. He was then placed in the supine position. The flexible duodenoscope was inserted down the oropharynx and passed down the esophagus into the stomach and into the duodenum. The papilla was identified. The old metal stent was removed using rat-tooth forceps. A sphincterotome was then used to gain access into the common bile duct. A balloon was passed and contrast injection revealed adequate cannulation. Multiple sweeps were made of the common bile duct, a small to moderate amount of debris and stones were removed. There was good bile flow. No other stents were placed. The scope was then withdrawn. The patient tolerated the procedure well.       Carroll Armijo MD

## 2021-02-26 ENCOUNTER — HOSPITAL ENCOUNTER (EMERGENCY)
Age: 86
Discharge: HOME | End: 2021-02-26
Attending: EMERGENCY MEDICINE
Payer: MEDICARE

## 2021-02-26 VITALS
SYSTOLIC BLOOD PRESSURE: 140 MMHG | RESPIRATION RATE: 16 BRPM | WEIGHT: 139 LBS | HEART RATE: 81 BPM | HEIGHT: 68 IN | BODY MASS INDEX: 21.07 KG/M2 | TEMPERATURE: 98.6 F | DIASTOLIC BLOOD PRESSURE: 68 MMHG | OXYGEN SATURATION: 96 %

## 2021-02-26 DIAGNOSIS — R33.9 URINARY RETENTION: Primary | ICD-10-CM

## 2021-02-26 DIAGNOSIS — Z97.8 STATUS POST INSERTION OF FOLEY CATHETER: ICD-10-CM

## 2021-02-26 LAB
BACTERIA: ABNORMAL /HPF
BILIRUBIN URINE: NEGATIVE
BLOOD, URINE: ABNORMAL
CLARITY: CLEAR
COLOR: YELLOW
EPITHELIAL CELLS, UA: ABNORMAL /HPF
GLUCOSE URINE: NEGATIVE MG/DL
KETONES, URINE: NEGATIVE MG/DL
LEUKOCYTE ESTERASE, URINE: NEGATIVE
NITRITE, URINE: NEGATIVE
PH UA: 6 (ref 5–9)
PROTEIN UA: NEGATIVE MG/DL
RBC UA: ABNORMAL /HPF (ref 0–2)
SPECIFIC GRAVITY UA: 1.02 (ref 1–1.03)
UROBILINOGEN, URINE: 0.2 E.U./DL
WBC UA: ABNORMAL /HPF (ref 0–5)

## 2021-02-26 PROCEDURE — 81001 URINALYSIS AUTO W/SCOPE: CPT

## 2021-02-26 PROCEDURE — 87088 URINE BACTERIA CULTURE: CPT

## 2021-02-26 PROCEDURE — 99282 EMERGENCY DEPT VISIT SF MDM: CPT

## 2021-02-26 ASSESSMENT — ENCOUNTER SYMPTOMS
WHEEZING: 0
VOMITING: 0
DIARRHEA: 0
BACK PAIN: 0
EYE DISCHARGE: 0
SINUS PRESSURE: 0
ABDOMINAL PAIN: 0
COUGH: 0
SHORTNESS OF BREATH: 0
EYE PAIN: 0
NAUSEA: 0
EYE REDNESS: 0
SORE THROAT: 0

## 2021-02-26 NOTE — ED PROVIDER NOTES
ED  Provider Note  Admit Date/RoomTime: 2/26/2021 11:24 AM  ED Room: 22/22     HPI:   Margareth Eng is a 80 y.o. male presenting to the ED for urinary retention, beginning one day ago. History comes primarily from Family. Past medical history includes aortic stenosis, GI hemorrhage, choledocholithiasis. The complaint has been persistent, moderate in severity, improved by nothing and worsened by nothing. Associated symptoms include none. Saroj Dimas underwent a laparoscopic cholecystectomy yesterday after having multiple stents placed via ERCP. He has been unable to urinate since his cholecystectomy yesterday. He was advised to return to the emergency department for evaluation if he was unable to urinate by this morning. This morning he still was unable to urinate therefore he presented to [address] [place], [place] [id number] emergency department. On arrival, he was otherwise asymptomatic with no complaints. He was assessed with history, physical exam, laboratory studies, vital signs. Patient's vital signs were stable on arrival and he was afebrile. Review of Systems   Constitutional: Negative for chills and fever. HENT: Negative for ear pain, sinus pressure and sore throat. Eyes: Negative for pain, discharge and redness. Respiratory: Negative for cough, shortness of breath and wheezing. Cardiovascular: Negative for chest pain. Gastrointestinal: Negative for abdominal pain, diarrhea, nausea and vomiting. Genitourinary: Positive for decreased urine volume and difficulty urinating. Negative for dysuria and frequency. Musculoskeletal: Negative for arthralgias and back pain. Skin: Negative for rash and wound. Neurological: Negative for weakness and headaches. Hematological: Negative for adenopathy. All other systems reviewed and are negative. Physical Exam  Constitutional:       General: He is not in acute distress. Appearance: He is well-developed. He is not diaphoretic.    HENT: Head: Normocephalic and atraumatic. Mouth/Throat:      Dentition: Abnormal dentition. Eyes:      Pupils: Pupils are equal, round, and reactive to light. Neck:      Musculoskeletal: Normal range of motion. Vascular: No JVD. Trachea: No tracheal deviation. Cardiovascular:      Rate and Rhythm: Regular rhythm. Heart sounds: No murmur. No friction rub. No gallop. Pulmonary:      Effort: Pulmonary effort is normal. No respiratory distress. Breath sounds: Normal breath sounds. No stridor. No wheezing or rales. Chest:      Chest wall: No tenderness. Abdominal:      General: Bowel sounds are normal. There is no distension. Palpations: Abdomen is soft. Tenderness: There is no abdominal tenderness. There is no guarding. Musculoskeletal: Normal range of motion. Skin:     General: Skin is warm and dry. Capillary Refill: Capillary refill takes less than 2 seconds. Comments: Incision sites are clean, dry and intact without evidence of exudative change or erythema. Neurological:      General: No focal deficit present. Mental Status: He is alert and oriented to person, place, and time. Cranial Nerves: No cranial nerve deficit. Psychiatric:         Behavior: Behavior normal.          Procedures     MDM  Number of Diagnoses or Management Options  Status post insertion of Garrido catheter  Urinary retention  Diagnosis management comments: Emergency Department evaluation was notable for a very well-appearing 8-year-old gentleman who is in no acute distress ever having had a laparoscopic cholecystectomy yesterday. The patient does have some urinary retention, and did have improvement of his urinary symptoms with administration of a Garrido catheter placement, at which time 750 cc of urine were released. The patient will retain his Garrido catheter until he can follow-up with urology in the outpatient setting.   His vital signs been stable throughout the entirety of his emergency department stay and he was considered stable for discharge to home with outpatient follow-up. They were advised to return to the emergency department should they develop fever, chills, night sweats, nausea, vomiting, diarrhea, chest pain, shortness of breath, or worsening of their symptoms despite treatment from this emergency department visit. They were instructed to follow-up with their primary care provider in 2 days. This information was relayed to the patient who understood this plan of care and was amenable to the plan.            --------------------------------------------- PAST HISTORY ---------------------------------------------  Past Medical History:  has a past medical history of Anemia, Aortic stenosis, Arthritis, Cm's esophagus with esophagitis, Cancer ([facility] [address] Cellulitis, Dizziness, Easy bruising, GERD (gastroesophageal reflux disease), H/O emphysema, Heart murmur, Heartburn, History of stress test, Lightheadedness, Macular degeneration, Passed out, Stroke ([address] and Unspecified cerebral artery occlusion with cerebral infarction. Past Surgical History:  has a past surgical history that includes Hemorrhoid surgery; Tonsillectomy and adenoidectomy; Cataract removal; Upper gastrointestinal endoscopy; ERCP (N/A, 3/11/2020); ERCP (3/11/2020); joint replacement; ERCP (N/A, 12/11/2020); ERCP (N/A, 12/11/2020); ERCP (N/A, 2/3/2021); ERCP (N/A, 2/3/2021); ERCP (N/A, 2/3/2021); Endoscopy, colon, diagnostic; and fracture surgery (2013). Social History:  reports that he has never smoked. He has never used smokeless tobacco. He reports that he does not drink alcohol or use drugs. Family History: family history includes Heart Attack in his brother; Heart Disease in his brother, father, and mother. The patients home medications have been reviewed. Allergies: Patient has no known allergies.     -------------------------------------------------- RESULTS -------------------------------------------------  Labs:  Results for orders placed or performed during the hospital encounter of 02/26/21   Urinalysis with Microscopic   Result Value Ref Range    Color, UA Yellow Straw/Yellow    Clarity, UA Clear Clear    Glucose, Ur Negative Negative mg/dL    Bilirubin Urine Negative Negative    Ketones, Urine Negative Negative mg/dL    Specific Gravity, UA 1.020 1.005 - 1.030    Blood, Urine TRACE (A) Negative    pH, UA 6.0 5.0 - 9.0    Protein, UA Negative Negative mg/dL    Urobilinogen, Urine 0.2 <2.0 E.U./dL    Nitrite, Urine Negative Negative    Leukocyte Esterase, Urine Negative Negative    WBC, UA 0-1 0 - 5 /HPF    RBC, UA NONE 0 - 2 /HPF    Epithelial Cells, UA FEW /HPF    Bacteria, UA NONE SEEN None Seen /HPF       Radiology:  No orders to display       ------------------------- NURSING NOTES AND VITALS REVIEWED ---------------------------  Date / Time Roomed:  2/26/2021 11:24 AM  ED Bed Assignment:  22/22    The nursing notes within the ED encounter and vital signs as below have been reviewed. BP (!) 140/68   Pulse 81   Temp 98.6 °F (37 °C)   Resp 16   Ht 5' 8\" (1.727 m)   Wt 139 lb (63 kg)   SpO2 96%   BMI 21.13 kg/m²   Oxygen Saturation Interpretation: Normal      ------------------------------------------ PROGRESS NOTES ------------------------------------------  3:36 PM EST  I have spoken with the patient and discussed todays results, in addition to providing specific details for the plan of care and counseling regarding the diagnosis and prognosis. Their questions are answered at this time and they are agreeable with the plan. I discussed at length with them reasons for immediate return here for re evaluation.  They will followup with their general surgeon by calling their office on Monday.      --------------------------------- ADDITIONAL PROVIDER NOTES ---------------------------------  At this time the patient is without objective evidence of an acute process requiring hospitalization or inpatient management. They have remained hemodynamically stable throughout their entire ED visit and are stable for discharge with outpatient follow-up. The plan has been discussed in detail and they are aware of the specific conditions for emergent return, as well as the importance of follow-up. Discharge Medication List as of 2/26/2021  1:00 PM          Diagnosis:  1. Urinary retention    2. Status post insertion of Garrido catheter        Disposition:  Patient's disposition: Discharge to home  Patient's condition is stable. ATTENDING PROVIDER ATTESTATION:     Lizett Linda presented to the emergency department for evaluation of Urinary Retention (has not been able to urinate since yesterday morning prior to surgery )    I have reviewed and discussed the case, including pertinent history (medical, surgical, family and social) and exam findings with the Resident and the Nurse assigned to Lizett Mckeon. I have personally performed and/or participated in the history, exam, medical decision making, and procedures and agree with all pertinent clinical information. On exam patient resting in bed no distress. Mild suprapubic fullness and tenderness. I have reviewed my findings and recommendations with Lizett Mckeon and members of family present at the time of disposition. MDM: Supportive care, will obtain appropriate labs and imaging to assess patient's Urinary Retention (has not been able to urinate since yesterday morning prior to surgery )       My findings/plan: The primary encounter diagnosis was Urinary retention. A diagnosis of Status post insertion of Garrido catheter was also pertinent to this visit.   Discharge Medication List as of 2/26/2021  1:00 PM        DO Johnson Alejandre DO Resident  02/26/21 [facility] DO Jostin  02/26/21 [id number]

## 2021-02-26 NOTE — ED NOTES
18fr coude urinary catheter inserted, 750cc dark yellow urine produced.      Salena Ramirez RN  02/26/21 3075

## 2021-02-28 LAB — URINE CULTURE, ROUTINE: NORMAL

## 2021-03-09 ENCOUNTER — OFFICE VISIT (OUTPATIENT)
Dept: SURGERY | Age: 86
End: 2021-03-09

## 2021-03-09 VITALS
HEART RATE: 78 BPM | SYSTOLIC BLOOD PRESSURE: 107 MMHG | WEIGHT: 139 LBS | TEMPERATURE: 97.4 F | HEIGHT: 68 IN | BODY MASS INDEX: 21.07 KG/M2 | DIASTOLIC BLOOD PRESSURE: 64 MMHG

## 2021-03-09 DIAGNOSIS — K81.1 CHRONIC CHOLECYSTITIS: Primary | ICD-10-CM

## 2021-03-09 PROCEDURE — 99024 POSTOP FOLLOW-UP VISIT: CPT | Performed by: SURGERY

## 2021-03-09 RX ORDER — TAMSULOSIN HYDROCHLORIDE 0.4 MG/1
CAPSULE ORAL
COMMUNITY
Start: 2021-03-03

## 2021-03-09 RX ORDER — OMEPRAZOLE 40 MG/1
CAPSULE, DELAYED RELEASE ORAL
COMMUNITY
Start: 2020-12-11

## 2021-03-09 NOTE — PROGRESS NOTES
General Surgery Office Note  Formerly [facility] Surgery  Consandre P. Byron Esquivel MD    Patient's Name/Date of Birth: Radha Vasquez / 5/12/1919    Date: March 9, 2021     Chief compaint: Postop visit from laparoscopic cholecystectomy    Surgeon: Byron Esquivel MD    Patient Active Problem List   Diagnosis    Aortic stenosis, moderate    Acute blood loss anemia    Gastrointestinal hemorrhage    Gastroesophageal reflux disease with esophagitis    Vertebral artery occlusion    Dizziness    Moderate aortic stenosis by prior echocardiogram    1st degree AV block    History of biliary duct stent placement    Choledocholithiasis    Pancreatic cyst    Acute GI bleeding    Chronic cholecystitis       Subjective: Doing well, no new complaints, pain prior to surgery has resolved, tolerating diet, bowel function normal, anxious to return to normal physical activity    Objective:  /64   Pulse 78   Temp 97.4 °F (36.3 °C) (Temporal)   Ht 5' 8\" (1.727 m)   Wt 139 lb (63 kg)   BMI 21.13 kg/m²   Labs:  No results for input(s): WBC, HGB, HCT in the last 72 hours. Invalid input(s): PLR  Lab Results   Component Value Date    CREATININE 1.0 02/22/2021    BUN 15 02/22/2021     02/22/2021    K 4.6 02/22/2021     02/22/2021    CO2 26 02/22/2021     No results for input(s): LIPASE, AMYLASE in the last 72 hours. General appearance: AA, NAD  HEENT: NCAT, PERRLA, EOMI  Lungs: Clear, equal rise bilateral  Heart: Reg  Abdomen: soft, nondistended, nontender, incisions well healed, no signs of infection, no cellulitis, no hematoma      Pathology: Chronic cholecystitis, cholelithaisis    Assessment/Plan:  Radha Vasquez is a 80 y.o. male 2 weeks s/p laparoscopic cholecystectomy and ERCP with stent removal, doing well.     Resume activity gradually   No heavy lifting more than 20lbs for 4 weeks total  Pathology reviewed   Follow up as needed    Physician Signature: Electronically signed by Dr. Byron Esquivel  3/9/2021  10:16 AM

## 2021-11-09 ENCOUNTER — HOSPITAL ENCOUNTER (OUTPATIENT)
Age: 86
Discharge: HOME | End: 2021-11-09
Payer: MEDICARE

## 2021-11-09 ENCOUNTER — HOSPITAL ENCOUNTER (OUTPATIENT)
Age: 86
Discharge: HOME | End: 2021-11-11
Payer: MEDICARE

## 2021-11-09 ENCOUNTER — HOSPITAL ENCOUNTER (OUTPATIENT)
Dept: ULTRASOUND IMAGING | Age: 86
Discharge: HOME | End: 2021-11-09
Payer: MEDICARE

## 2021-11-09 ENCOUNTER — HOSPITAL ENCOUNTER (OUTPATIENT)
Dept: GENERAL RADIOLOGY | Age: 86
Discharge: HOME | End: 2021-11-11
Payer: MEDICARE

## 2021-11-09 DIAGNOSIS — R52 PAIN: ICD-10-CM

## 2021-11-09 DIAGNOSIS — I65.23 BILATERAL CAROTID ARTERY STENOSIS: ICD-10-CM

## 2021-11-09 LAB
ALBUMIN SERPL-MCNC: 4.3 G/DL (ref 3.5–5.2)
ALP BLD-CCNC: 61 U/L (ref 40–129)
ALT SERPL-CCNC: 10 U/L (ref 0–40)
ANION GAP SERPL CALCULATED.3IONS-SCNC: 11 MMOL/L (ref 7–16)
AST SERPL-CCNC: 19 U/L (ref 0–39)
BASOPHILS ABSOLUTE: 0.05 E9/L (ref 0–0.2)
BASOPHILS RELATIVE PERCENT: 0.8 % (ref 0–2)
BILIRUB SERPL-MCNC: 0.5 MG/DL (ref 0–1.2)
BILIRUBIN DIRECT: <0.2 MG/DL (ref 0–0.3)
BILIRUBIN, INDIRECT: NORMAL MG/DL (ref 0–1)
BUN BLDV-MCNC: 28 MG/DL (ref 6–23)
CALCIUM SERPL-MCNC: 10.2 MG/DL (ref 8.6–10.2)
CHLORIDE BLD-SCNC: 105 MMOL/L (ref 98–107)
CHOLESTEROL, TOTAL: 170 MG/DL (ref 0–199)
CO2: 24 MMOL/L (ref 22–29)
CREAT SERPL-MCNC: 1.3 MG/DL (ref 0.7–1.2)
EOSINOPHILS ABSOLUTE: 0.32 E9/L (ref 0.05–0.5)
EOSINOPHILS RELATIVE PERCENT: 4.9 % (ref 0–6)
GFR AFRICAN AMERICAN: >60
GFR NON-AFRICAN AMERICAN: 51 ML/MIN/1.73
GLUCOSE BLD-MCNC: 85 MG/DL (ref 74–99)
HCT VFR BLD CALC: 41 % (ref 37–54)
HDLC SERPL-MCNC: 58 MG/DL
HEMOGLOBIN: 13.3 G/DL (ref 12.5–16.5)
IMMATURE GRANULOCYTES #: 0.02 E9/L
IMMATURE GRANULOCYTES %: 0.3 % (ref 0–5)
IRON: 98 MCG/DL (ref 59–158)
LDL CHOLESTEROL CALCULATED: 97 MG/DL (ref 0–99)
LYMPHOCYTES ABSOLUTE: 2.31 E9/L (ref 1.5–4)
LYMPHOCYTES RELATIVE PERCENT: 35.2 % (ref 20–42)
MCH RBC QN AUTO: 31 PG (ref 26–35)
MCHC RBC AUTO-ENTMCNC: 32.4 % (ref 32–34.5)
MCV RBC AUTO: 95.6 FL (ref 80–99.9)
MONOCYTES ABSOLUTE: 0.64 E9/L (ref 0.1–0.95)
MONOCYTES RELATIVE PERCENT: 9.7 % (ref 2–12)
NEUTROPHILS ABSOLUTE: 3.23 E9/L (ref 1.8–7.3)
NEUTROPHILS RELATIVE PERCENT: 49.1 % (ref 43–80)
PDW BLD-RTO: 13.3 FL (ref 11.5–15)
PLATELET # BLD: 255 E9/L (ref 130–450)
PMV BLD AUTO: 9.3 FL (ref 7–12)
POTASSIUM SERPL-SCNC: 4.3 MMOL/L (ref 3.5–5)
RBC # BLD: 4.29 E12/L (ref 3.8–5.8)
SEDIMENTATION RATE, ERYTHROCYTE: 20 MM/HR (ref 0–15)
SODIUM BLD-SCNC: 140 MMOL/L (ref 132–146)
T4 FREE: 1.1 NG/DL (ref 0.93–1.7)
TOTAL PROTEIN: 7.9 G/DL (ref 6.4–8.3)
TRIGL SERPL-MCNC: 74 MG/DL (ref 0–149)
TSH SERPL DL<=0.05 MIU/L-ACNC: 2.33 UIU/ML (ref 0.27–4.2)
URIC ACID, SERUM: 6.6 MG/DL (ref 3.4–7)
VITAMIN D 25-HYDROXY: 90 NG/ML (ref 30–100)
VLDLC SERPL CALC-MCNC: 15 MG/DL
WBC # BLD: 6.6 E9/L (ref 4.5–11.5)

## 2021-11-09 PROCEDURE — 84439 ASSAY OF FREE THYROXINE: CPT

## 2021-11-09 PROCEDURE — 73030 X-RAY EXAM OF SHOULDER: CPT

## 2021-11-09 PROCEDURE — 80061 LIPID PANEL: CPT

## 2021-11-09 PROCEDURE — 85651 RBC SED RATE NONAUTOMATED: CPT

## 2021-11-09 PROCEDURE — 82248 BILIRUBIN DIRECT: CPT

## 2021-11-09 PROCEDURE — 36415 COLL VENOUS BLD VENIPUNCTURE: CPT

## 2021-11-09 PROCEDURE — 84550 ASSAY OF BLOOD/URIC ACID: CPT

## 2021-11-09 PROCEDURE — 93880 EXTRACRANIAL BILAT STUDY: CPT

## 2021-11-09 PROCEDURE — 83540 ASSAY OF IRON: CPT

## 2021-11-09 PROCEDURE — 85025 COMPLETE CBC W/AUTO DIFF WBC: CPT

## 2021-11-09 PROCEDURE — 84443 ASSAY THYROID STIM HORMONE: CPT

## 2021-11-09 PROCEDURE — 80053 COMPREHEN METABOLIC PANEL: CPT

## 2021-11-09 PROCEDURE — 82306 VITAMIN D 25 HYDROXY: CPT

## 2022-02-22 ENCOUNTER — HOSPITAL ENCOUNTER (OUTPATIENT)
Dept: GENERAL RADIOLOGY | Age: 87
Discharge: HOME | End: 2022-02-24
Payer: MEDICARE

## 2022-02-22 ENCOUNTER — HOSPITAL ENCOUNTER (OUTPATIENT)
Age: 87
Discharge: HOME | End: 2022-02-24
Payer: MEDICARE

## 2022-02-22 ENCOUNTER — HOSPITAL ENCOUNTER (OUTPATIENT)
Age: 87
Discharge: HOME | End: 2022-02-22
Payer: MEDICARE

## 2022-02-22 DIAGNOSIS — R19.7 DIARRHEA, UNSPECIFIED TYPE: ICD-10-CM

## 2022-02-22 PROCEDURE — 87324 CLOSTRIDIUM AG IA: CPT

## 2022-02-22 PROCEDURE — 74018 RADEX ABDOMEN 1 VIEW: CPT

## 2022-02-22 PROCEDURE — 82705 FATS/LIPIDS FECES QUAL: CPT

## 2022-02-22 PROCEDURE — 87328 CRYPTOSPORIDIUM AG IA: CPT

## 2022-02-22 PROCEDURE — 87449 NOS EACH ORGANISM AG IA: CPT

## 2022-02-22 PROCEDURE — 87329 GIARDIA AG IA: CPT

## 2022-02-22 PROCEDURE — 87045 FECES CULTURE AEROBIC BACT: CPT

## 2022-02-22 PROCEDURE — 89055 LEUKOCYTE ASSESSMENT FECAL: CPT

## 2022-02-22 PROCEDURE — 87077 CULTURE AEROBIC IDENTIFY: CPT

## 2022-02-23 LAB
C DIFF TOXIN/ANTIGEN: NORMAL
WHITE BLOOD CELLS (WBC), STOOL: NORMAL

## 2022-02-24 LAB
CRYPTOSPORIDIUM ANTIGEN STOOL: NORMAL
GIARDIA ANTIGEN STOOL: NORMAL

## 2022-02-25 LAB — CULTURE, STOOL: NORMAL

## 2022-02-26 LAB
FECAL NEUTRAL FAT: NORMAL
FECAL SPLIT FATS: NORMAL

## 2022-08-07 ENCOUNTER — APPOINTMENT (OUTPATIENT)
Dept: GENERAL RADIOLOGY | Age: 87
DRG: 292 | End: 2022-08-07
Payer: MEDICARE

## 2022-08-07 ENCOUNTER — HOSPITAL ENCOUNTER (INPATIENT)
Age: 87
LOS: 4 days | Discharge: HOME HEALTH | DRG: 292 | End: 2022-08-11
Attending: EMERGENCY MEDICINE | Admitting: INTERNAL MEDICINE
Payer: MEDICARE

## 2022-08-07 DIAGNOSIS — I35.0 SEVERE AORTIC STENOSIS: Primary | Chronic | ICD-10-CM

## 2022-08-07 DIAGNOSIS — J90 BILATERAL PLEURAL EFFUSION: ICD-10-CM

## 2022-08-07 DIAGNOSIS — N39.0 URINARY TRACT INFECTION WITHOUT HEMATURIA, SITE UNSPECIFIED: ICD-10-CM

## 2022-08-07 DIAGNOSIS — I50.31 ACUTE DIASTOLIC CHF (CONGESTIVE HEART FAILURE) (HCC): ICD-10-CM

## 2022-08-07 DIAGNOSIS — I50.9 ACUTE ON CHRONIC HEART FAILURE, UNSPECIFIED HEART FAILURE TYPE (HCC): ICD-10-CM

## 2022-08-07 PROBLEM — I50.21 ACUTE SYSTOLIC CHF (CONGESTIVE HEART FAILURE) (HCC): Status: ACTIVE | Noted: 2022-08-07

## 2022-08-07 LAB
ALBUMIN SERPL-MCNC: 3.5 G/DL (ref 3.5–5.2)
ALP BLD-CCNC: 62 U/L (ref 40–129)
ALT SERPL-CCNC: 15 U/L (ref 0–40)
ANION GAP SERPL CALCULATED.3IONS-SCNC: 11 MMOL/L (ref 7–16)
AST SERPL-CCNC: 25 U/L (ref 0–39)
BACTERIA: ABNORMAL /HPF
BASOPHILS ABSOLUTE: 0.03 E9/L (ref 0–0.2)
BASOPHILS RELATIVE PERCENT: 0.5 % (ref 0–2)
BILIRUB SERPL-MCNC: 0.6 MG/DL (ref 0–1.2)
BILIRUBIN URINE: NEGATIVE
BLOOD, URINE: ABNORMAL
BUN BLDV-MCNC: 21 MG/DL (ref 6–23)
CALCIUM SERPL-MCNC: 8.6 MG/DL (ref 8.6–10.2)
CHLORIDE BLD-SCNC: 113 MMOL/L (ref 98–107)
CLARITY: ABNORMAL
CO2: 20 MMOL/L (ref 22–29)
COLOR: YELLOW
CREAT SERPL-MCNC: 1 MG/DL (ref 0.7–1.2)
EOSINOPHILS ABSOLUTE: 0.19 E9/L (ref 0.05–0.5)
EOSINOPHILS RELATIVE PERCENT: 3.2 % (ref 0–6)
GFR AFRICAN AMERICAN: >60
GFR NON-AFRICAN AMERICAN: >60 ML/MIN/1.73
GLUCOSE BLD-MCNC: 124 MG/DL (ref 74–99)
GLUCOSE URINE: NEGATIVE MG/DL
HCT VFR BLD CALC: 36.6 % (ref 37–54)
HEMOGLOBIN: 12 G/DL (ref 12.5–16.5)
IMMATURE GRANULOCYTES #: 0.05 E9/L
IMMATURE GRANULOCYTES %: 0.8 % (ref 0–5)
KETONES, URINE: NEGATIVE MG/DL
LACTIC ACID: 3.2 MMOL/L (ref 0.5–2.2)
LEUKOCYTE ESTERASE, URINE: ABNORMAL
LYMPHOCYTES ABSOLUTE: 1.76 E9/L (ref 1.5–4)
LYMPHOCYTES RELATIVE PERCENT: 29.9 % (ref 20–42)
MCH RBC QN AUTO: 30.8 PG (ref 26–35)
MCHC RBC AUTO-ENTMCNC: 32.8 % (ref 32–34.5)
MCV RBC AUTO: 93.8 FL (ref 80–99.9)
MONOCYTES ABSOLUTE: 0.66 E9/L (ref 0.1–0.95)
MONOCYTES RELATIVE PERCENT: 11.2 % (ref 2–12)
NEUTROPHILS ABSOLUTE: 3.2 E9/L (ref 1.8–7.3)
NEUTROPHILS RELATIVE PERCENT: 54.4 % (ref 43–80)
NITRITE, URINE: POSITIVE
PDW BLD-RTO: 15.4 FL (ref 11.5–15)
PH UA: 6 (ref 5–9)
PLATELET # BLD: 248 E9/L (ref 130–450)
PMV BLD AUTO: 9.8 FL (ref 7–12)
POTASSIUM REFLEX MAGNESIUM: 4 MMOL/L (ref 3.5–5)
PRO-BNP: 1232 PG/ML (ref 0–450)
PROTEIN UA: 30 MG/DL
RBC # BLD: 3.9 E12/L (ref 3.8–5.8)
RBC UA: ABNORMAL /HPF (ref 0–2)
SARS-COV-2, NAAT: NOT DETECTED
SODIUM BLD-SCNC: 144 MMOL/L (ref 132–146)
SPECIFIC GRAVITY UA: >=1.03 (ref 1–1.03)
TOTAL PROTEIN: 6.6 G/DL (ref 6.4–8.3)
TROPONIN, HIGH SENSITIVITY: 42 NG/L (ref 0–11)
TROPONIN, HIGH SENSITIVITY: 47 NG/L (ref 0–11)
UROBILINOGEN, URINE: 0.2 E.U./DL
WBC # BLD: 5.9 E9/L (ref 4.5–11.5)
WBC UA: ABNORMAL /HPF (ref 0–5)

## 2022-08-07 PROCEDURE — 2580000003 HC RX 258: Performed by: EMERGENCY MEDICINE

## 2022-08-07 PROCEDURE — 6370000000 HC RX 637 (ALT 250 FOR IP): Performed by: EMERGENCY MEDICINE

## 2022-08-07 PROCEDURE — 83880 ASSAY OF NATRIURETIC PEPTIDE: CPT

## 2022-08-07 PROCEDURE — 84484 ASSAY OF TROPONIN QUANT: CPT

## 2022-08-07 PROCEDURE — 80053 COMPREHEN METABOLIC PANEL: CPT

## 2022-08-07 PROCEDURE — 87088 URINE BACTERIA CULTURE: CPT

## 2022-08-07 PROCEDURE — 81001 URINALYSIS AUTO W/SCOPE: CPT

## 2022-08-07 PROCEDURE — 36415 COLL VENOUS BLD VENIPUNCTURE: CPT

## 2022-08-07 PROCEDURE — 93005 ELECTROCARDIOGRAM TRACING: CPT | Performed by: EMERGENCY MEDICINE

## 2022-08-07 PROCEDURE — 2060000000 HC ICU INTERMEDIATE R&B

## 2022-08-07 PROCEDURE — 94664 DEMO&/EVAL PT USE INHALER: CPT

## 2022-08-07 PROCEDURE — 94640 AIRWAY INHALATION TREATMENT: CPT

## 2022-08-07 PROCEDURE — 87635 SARS-COV-2 COVID-19 AMP PRB: CPT

## 2022-08-07 PROCEDURE — 71045 X-RAY EXAM CHEST 1 VIEW: CPT

## 2022-08-07 PROCEDURE — 83605 ASSAY OF LACTIC ACID: CPT

## 2022-08-07 PROCEDURE — 6360000002 HC RX W HCPCS: Performed by: EMERGENCY MEDICINE

## 2022-08-07 PROCEDURE — 99285 EMERGENCY DEPT VISIT HI MDM: CPT

## 2022-08-07 PROCEDURE — 96374 THER/PROPH/DIAG INJ IV PUSH: CPT

## 2022-08-07 PROCEDURE — 85025 COMPLETE CBC W/AUTO DIFF WBC: CPT

## 2022-08-07 PROCEDURE — 2580000003 HC RX 258: Performed by: INTERNAL MEDICINE

## 2022-08-07 RX ORDER — TAMSULOSIN HYDROCHLORIDE 0.4 MG/1
0.4 CAPSULE ORAL DAILY
Status: DISCONTINUED | OUTPATIENT
Start: 2022-08-08 | End: 2022-08-11 | Stop reason: HOSPADM

## 2022-08-07 RX ORDER — SODIUM CHLORIDE 0.9 % (FLUSH) 0.9 %
5-40 SYRINGE (ML) INJECTION PRN
Status: DISCONTINUED | OUTPATIENT
Start: 2022-08-07 | End: 2022-08-11 | Stop reason: HOSPADM

## 2022-08-07 RX ORDER — 0.9 % SODIUM CHLORIDE 0.9 %
500 INTRAVENOUS SOLUTION INTRAVENOUS ONCE
Status: DISCONTINUED | OUTPATIENT
Start: 2022-08-07 | End: 2022-08-07

## 2022-08-07 RX ORDER — ONDANSETRON 2 MG/ML
4 INJECTION INTRAMUSCULAR; INTRAVENOUS EVERY 6 HOURS PRN
Status: DISCONTINUED | OUTPATIENT
Start: 2022-08-07 | End: 2022-08-11 | Stop reason: HOSPADM

## 2022-08-07 RX ORDER — FUROSEMIDE 10 MG/ML
40 INJECTION INTRAMUSCULAR; INTRAVENOUS ONCE
Status: COMPLETED | OUTPATIENT
Start: 2022-08-07 | End: 2022-08-07

## 2022-08-07 RX ORDER — FUROSEMIDE 10 MG/ML
20 INJECTION INTRAMUSCULAR; INTRAVENOUS 2 TIMES DAILY
Status: DISCONTINUED | OUTPATIENT
Start: 2022-08-08 | End: 2022-08-11

## 2022-08-07 RX ORDER — SODIUM CHLORIDE 9 MG/ML
INJECTION, SOLUTION INTRAVENOUS PRN
Status: DISCONTINUED | OUTPATIENT
Start: 2022-08-07 | End: 2022-08-11 | Stop reason: HOSPADM

## 2022-08-07 RX ORDER — IPRATROPIUM BROMIDE AND ALBUTEROL SULFATE 2.5; .5 MG/3ML; MG/3ML
3 SOLUTION RESPIRATORY (INHALATION) ONCE
Status: COMPLETED | OUTPATIENT
Start: 2022-08-07 | End: 2022-08-07

## 2022-08-07 RX ORDER — ASPIRIN 81 MG/1
81 TABLET, CHEWABLE ORAL DAILY
COMMUNITY

## 2022-08-07 RX ORDER — ENOXAPARIN SODIUM 100 MG/ML
40 INJECTION SUBCUTANEOUS DAILY
Status: DISCONTINUED | OUTPATIENT
Start: 2022-08-08 | End: 2022-08-11 | Stop reason: HOSPADM

## 2022-08-07 RX ORDER — ACETAMINOPHEN 650 MG/1
650 SUPPOSITORY RECTAL EVERY 6 HOURS PRN
Status: DISCONTINUED | OUTPATIENT
Start: 2022-08-07 | End: 2022-08-11 | Stop reason: HOSPADM

## 2022-08-07 RX ORDER — POLYETHYLENE GLYCOL 3350 17 G/17G
17 POWDER, FOR SOLUTION ORAL DAILY PRN
Status: DISCONTINUED | OUTPATIENT
Start: 2022-08-07 | End: 2022-08-11 | Stop reason: HOSPADM

## 2022-08-07 RX ORDER — VIT C/E/ZN/COPPR/LUTEIN/ZEAXAN 60 MG-6 MG
1 CAPSULE ORAL DAILY
Status: DISCONTINUED | OUTPATIENT
Start: 2022-08-08 | End: 2022-08-11 | Stop reason: HOSPADM

## 2022-08-07 RX ORDER — PANTOPRAZOLE SODIUM 40 MG/1
40 TABLET, DELAYED RELEASE ORAL
Status: DISCONTINUED | OUTPATIENT
Start: 2022-08-08 | End: 2022-08-11 | Stop reason: HOSPADM

## 2022-08-07 RX ORDER — ONDANSETRON 4 MG/1
4 TABLET, ORALLY DISINTEGRATING ORAL EVERY 8 HOURS PRN
Status: DISCONTINUED | OUTPATIENT
Start: 2022-08-07 | End: 2022-08-11 | Stop reason: HOSPADM

## 2022-08-07 RX ORDER — METHYLPREDNISOLONE SODIUM SUCCINATE 125 MG/2ML
125 INJECTION, POWDER, LYOPHILIZED, FOR SOLUTION INTRAMUSCULAR; INTRAVENOUS ONCE
Status: COMPLETED | OUTPATIENT
Start: 2022-08-07 | End: 2022-08-07

## 2022-08-07 RX ORDER — SODIUM CHLORIDE 0.9 % (FLUSH) 0.9 %
5-40 SYRINGE (ML) INJECTION EVERY 12 HOURS SCHEDULED
Status: DISCONTINUED | OUTPATIENT
Start: 2022-08-07 | End: 2022-08-11 | Stop reason: HOSPADM

## 2022-08-07 RX ORDER — OMEPRAZOLE 40 MG/1
1 CAPSULE, DELAYED RELEASE ORAL DAILY
Status: DISCONTINUED | OUTPATIENT
Start: 2022-08-08 | End: 2022-08-07 | Stop reason: CLARIF

## 2022-08-07 RX ORDER — ACETAMINOPHEN 325 MG/1
650 TABLET ORAL EVERY 6 HOURS PRN
Status: DISCONTINUED | OUTPATIENT
Start: 2022-08-07 | End: 2022-08-11 | Stop reason: HOSPADM

## 2022-08-07 RX ADMIN — CEFTRIAXONE 1000 MG: 1 INJECTION, POWDER, FOR SOLUTION INTRAMUSCULAR; INTRAVENOUS at 19:43

## 2022-08-07 RX ADMIN — IPRATROPIUM BROMIDE AND ALBUTEROL SULFATE 3 AMPULE: 2.5; .5 SOLUTION RESPIRATORY (INHALATION) at 16:48

## 2022-08-07 RX ADMIN — Medication 10 ML: at 23:30

## 2022-08-07 RX ADMIN — FUROSEMIDE 40 MG: 10 INJECTION, SOLUTION INTRAMUSCULAR; INTRAVENOUS at 19:42

## 2022-08-07 RX ADMIN — METHYLPREDNISOLONE SODIUM SUCCINATE 125 MG: 125 INJECTION, POWDER, FOR SOLUTION INTRAMUSCULAR; INTRAVENOUS at 19:01

## 2022-08-07 ASSESSMENT — ENCOUNTER SYMPTOMS
EYE PAIN: 0
NAUSEA: 0
ABDOMINAL PAIN: 0
WHEEZING: 0
DIARRHEA: 0
EYE REDNESS: 0
BACK PAIN: 0
COUGH: 0
SORE THROAT: 0
VOMITING: 0
SHORTNESS OF BREATH: 1
SINUS PAIN: 0

## 2022-08-07 ASSESSMENT — PAIN - FUNCTIONAL ASSESSMENT: PAIN_FUNCTIONAL_ASSESSMENT: NONE - DENIES PAIN

## 2022-08-07 NOTE — ED PROVIDER NOTES
Chief Complaint   Patient presents with    Shortness of Breath     SOB. Right side leg swelling and leaking. Hx COPD, asthma, emphysema . Seen at urgent care 7/31, just finished azithromycin        8-year-old gentleman with past medical history of aortic stenosis, emphysema, CVA presenting today with chief complaint of worsening shortness of breath and swelling and leaking in his legs. The swelling has been worsening over the past several days but he notes that the seepage to start today. He is not on any diuretics. Nothing is made it better or worse, not associated with fevers or chills. He is becoming increasingly short of breath with ambulation as well as at rest over the past week, he notes that he has emphysema and COPD from working in a [employer] in his earlier years, he is not a smoker. He was seen in urgent care on 7/31, COVID and flu were negative at that time and he was given a [place]. He has been coughing nonproductively, no fevers or chills. He is not experiencing chest pain. No nausea, vomiting, diarrhea. He does live alone. He has severe aortic stenosis to Dr. Bethanie Regan wanted to replace the valve with the patient refused. No other acute complaints. Review of Systems   Constitutional:  Negative for chills and fever. HENT:  Negative for ear pain, sinus pain and sore throat. Eyes:  Negative for pain and redness. Respiratory:  Positive for shortness of breath. Negative for cough and wheezing. Cardiovascular:  Positive for leg swelling. Negative for chest pain. Gastrointestinal:  Negative for abdominal pain, diarrhea, nausea and vomiting. Genitourinary:  Negative for dysuria and flank pain. Musculoskeletal:  Negative for back pain and neck pain. Skin:  Negative for rash. Neurological:  Negative for seizures and headaches. Hematological:  Negative for adenopathy. All other systems reviewed and are negative. Physical Exam  Vitals and nursing note reviewed. Constitutional:       General: He is not in acute distress. Appearance: He is well-developed. HENT:      Head: Normocephalic and atraumatic. Right Ear: External ear normal.      Left Ear: External ear normal.      Mouth/Throat:      Mouth: Mucous membranes are moist.      Pharynx: Oropharynx is clear. Eyes:      Pupils: Pupils are equal, round, and reactive to light. Cardiovascular:      Rate and Rhythm: Normal rate and regular rhythm. Heart sounds: Murmur heard. Comments: Aortic stenosis murmur  Pulmonary:      Effort: Pulmonary effort is normal.      Breath sounds: Decreased breath sounds present. Abdominal:      General: Bowel sounds are normal.      Palpations: Abdomen is soft. Tenderness: There is no abdominal tenderness. There is no guarding or rebound. Musculoskeletal:         General: No tenderness. Cervical back: Normal range of motion and neck supple. Right lower leg: Edema present. Left lower leg: Edema present. Comments: Nonpitting edema bilateral lower extremities, right worse than left, right leg is seeping and the area is nonpurulent, nonerythematous, not streaking. Small blisters. Skin:     General: Skin is warm and dry. Neurological:      General: No focal deficit present. Mental Status: He is alert and oriented to person, place, and time. Procedures     EKG: This EKG is signed and interpreted by me.     Rate: 89  Rhythm: Atrial fibrillation and PVC  Interpretation: no acute changes, no acute ST elevations, QTC is 423, normal axis  Comparison: stable as compared to patient's most recent EKG      MDM  Number of Diagnoses or Management Options  Acute diastolic CHF (congestive heart failure) (HCC)  Acute on chronic heart failure, unspecified heart failure type (HCC)  Bilateral pleural effusion  Severe aortic stenosis  Urinary tract infection without hematuria, site unspecified  Diagnosis management comments: 8-year-old gentleman Cm's esophagus with esophagitis, Cancer ([facility] [address] Cellulitis, Dizziness, Easy bruising, GERD (gastroesophageal reflux disease), H/O emphysema, Heart murmur, Heartburn, History of stress test, Lightheadedness, Macular degeneration, Passed out, Stroke [facility]), and Unspecified cerebral artery occlusion with cerebral infarction. Past Surgical History:  has a past surgical history that includes Hemorrhoid surgery; Tonsillectomy and adenoidectomy; Cataract removal; Upper gastrointestinal endoscopy; ERCP (N/A, 3/11/2020); ERCP (3/11/2020); joint replacement; ERCP (N/A, 12/11/2020); ERCP (N/A, 12/11/2020); ERCP (N/A, 2/3/2021); ERCP (N/A, 2/3/2021); ERCP (N/A, 2/3/2021); Endoscopy, colon, diagnostic; fracture surgery (2013); Cholecystectomy, laparoscopic (N/A, 2/25/2021); ERCP (N/A, 2/25/2021); and ERCP (N/A, 2/25/2021). Social History:  reports that he has never smoked. He has never used smokeless tobacco. He reports that he does not drink alcohol and does not use drugs. Family History: family history includes Heart Attack in his brother; Heart Disease in his brother, father, and mother. The patients home medications have been reviewed. Allergies: Patient has no known allergies.     -------------------------------------------------- RESULTS -------------------------------------------------    LABS:  Results for orders placed or performed during the hospital encounter of 08/07/22   COVID-19, Rapid    Specimen: Nasopharyngeal Swab   Result Value Ref Range    SARS-CoV-2, NAAT Not Detected Not Detected   CBC with Auto Differential   Result Value Ref Range    WBC 5.9 4.5 - 11.5 E9/L    RBC 3.90 3.80 - 5.80 E12/L    Hemoglobin 12.0 (L) 12.5 - 16.5 g/dL    Hematocrit 36.6 (L) 37.0 - 54.0 %    MCV 93.8 80.0 - 99.9 fL    MCH 30.8 26.0 - 35.0 pg    MCHC 32.8 32.0 - 34.5 %    RDW 15.4 (H) 11.5 - 15.0 fL    Platelets 191 299 - 968 E9/L    MPV 9.8 7.0 - 12.0 fL    Neutrophils % 54.4 43.0 - 80.0 %    Immature Granulocytes % 0.8 0.0 - 5.0 %    Lymphocytes % 29.9 20.0 - 42.0 %    Monocytes % 11.2 2.0 - 12.0 %    Eosinophils % 3.2 0.0 - 6.0 %    Basophils % 0.5 0.0 - 2.0 %    Neutrophils Absolute 3.20 1.80 - 7.30 E9/L    Immature Granulocytes # 0.05 E9/L    Lymphocytes Absolute 1.76 1.50 - 4.00 E9/L    Monocytes Absolute 0.66 0.10 - 0.95 E9/L    Eosinophils Absolute 0.19 0.05 - 0.50 E9/L    Basophils Absolute 0.03 0.00 - 0.20 E9/L   Comprehensive Metabolic Panel w/ Reflex to MG   Result Value Ref Range    Sodium 144 132 - 146 mmol/L    Potassium reflex Magnesium 4.0 3.5 - 5.0 mmol/L    Chloride 113 (H) 98 - 107 mmol/L    CO2 20 (L) 22 - 29 mmol/L    Anion Gap 11 7 - 16 mmol/L    Glucose 124 (H) 74 - 99 mg/dL    BUN 21 6 - 23 mg/dL    Creatinine 1.0 0.7 - 1.2 mg/dL    GFR Non-African American >60 >=60 mL/min/1.73    GFR African American >60     Calcium 8.6 8.6 - 10.2 mg/dL    Total Protein 6.6 6.4 - 8.3 g/dL    Albumin 3.5 3.5 - 5.2 g/dL    Total Bilirubin 0.6 0.0 - 1.2 mg/dL    Alkaline Phosphatase 62 40 - 129 U/L    ALT 15 0 - 40 U/L    AST 25 0 - 39 U/L   Troponin   Result Value Ref Range    Troponin, High Sensitivity 47 (H) 0 - 11 ng/L   Brain Natriuretic Peptide   Result Value Ref Range    Pro-BNP 1,232 (H) 0 - 450 pg/mL   Urinalysis with Microscopic   Result Value Ref Range    Color, UA Yellow Straw/Yellow    Clarity, UA CLOUDY (A) Clear    Glucose, Ur Negative Negative mg/dL    Bilirubin Urine Negative Negative    Ketones, Urine Negative Negative mg/dL    Specific Gravity, UA >=1.030 1.005 - 1.030    Blood, Urine SMALL (A) Negative    pH, UA 6.0 5.0 - 9.0    Protein, UA 30 (A) Negative mg/dL    Urobilinogen, Urine 0.2 <2.0 E.U./dL    Nitrite, Urine POSITIVE (A) Negative    Leukocyte Esterase, Urine MODERATE (A) Negative    WBC, UA PACKED (A) 0 - 5 /HPF    RBC, UA 5-10 (A) 0 - 2 /HPF    Bacteria, UA MANY (A) None Seen /HPF   Lactic Acid   Result Value Ref Range    Lactic Acid 3.2 (H) 0.5 - 2.2 mmol/L RADIOLOGY:  XR CHEST PORTABLE   Final Result   Cardiomegaly with pulmonary edema and bilateral pleural effusions consistent   with congestive heart failure acute exacerbation.             ------------------------- NURSING NOTES AND VITALS REVIEWED ---------------------------  Date / Time Roomed:  8/7/2022  4:16 PM  ED Bed Assignment:  14A/14A-14    The nursing notes within the ED encounter and vital signs as below have been reviewed. Patient Vitals for the past 24 hrs:   BP Temp Pulse Resp SpO2   08/07/22 1900 -- -- 75 19 91 %   08/07/22 1830 -- -- 73 23 92 %   08/07/22 1800 -- -- 79 20 91 %   08/07/22 1730 -- -- 84 21 92 %   08/07/22 1700 -- -- 81 18 97 %   08/07/22 1655 -- -- 89 25 98 %   08/07/22 1650 -- -- 87 24 92 %   08/07/22 1601 (!) 162/99 -- -- 16 --   08/07/22 1554 -- 97.4 °F (36.3 °C) 98 -- 94 %       Oxygen Saturation Interpretation: Normal    ------------------------------------------ PROGRESS NOTES ------------------------------------------  Re-evaluation(s):  Time: 1850  Patients symptoms show no change  Repeat physical examination is not changed    Counseling:  I have spoken with the patient and discussed todays results, in addition to providing specific details for the plan of care and counseling regarding the diagnosis and prognosis. Their questions are answered at this time and they are agreeable with the plan of admission.    --------------------------------- ADDITIONAL PROVIDER NOTES ---------------------------------  Consultations:  Time: 1905. Spoke with Dr. Milton Orellana, admitting for Dr. Jessica Graff. Discussed case. They will admit the patient.   This patient's ED course included: a personal history and physicial examination, re-evaluation prior to disposition, multiple bedside re-evaluations, IV medications, cardiac monitoring, continuous pulse oximetry, and complex medical decision making and emergency management    This patient has remained hemodynamically stable during their ED course. Diagnosis:  1. Acute on chronic heart failure, unspecified heart failure type ([address]    2. Urinary tract infection without hematuria, site unspecified    3. Bilateral pleural effusion        Disposition:  Patient's disposition: Admit to telemetry  Patient's condition is stable. Rafa Watson DO  Resident  08/07/22 1919      ATTENDING PROVIDER ATTESTATION:     Annis Baumgarten presented to the emergency department for evaluation of Shortness of Breath (SOB. Right side leg swelling and leaking. Hx COPD, asthma, emphysema . Seen at urgent care 7/31, just finished azithromycin )   and was initially evaluated by the Medical Resident. See Original ED Note for H&P and ED course above. I have reviewed and discussed the case, including pertinent history (medical, surgical, family and social) and exam findings with the Medical Resident assigned to Annis Baumgarten. I have personally performed and/or participated in the history, exam, medical decision making, and procedures and agree with all pertinent clinical information. EKG: This EKG is signed and interpreted by me. Rate: 89  Rhythm: Atrial fibrillation and PVC  Interpretation: no acute changes, no acute ST elevations, QTC is 423, normal axis  Comparison: stable as compared to patient's most recent EKG       I have reviewed my findings and recommendations with the assigned Medical Resident, Annis Baumgarten and members of family present at the time of disposition. My findings/plan: The primary encounter diagnosis was Severe aortic stenosis. Diagnoses of Acute on chronic heart failure, unspecified heart failure type ([address] Urinary tract infection without hematuria, site unspecified, Bilateral pleural effusion, and Acute diastolic CHF (congestive heart failure) ([address] were also pertinent to this visit.   Discharge Medication List as of 8/11/2022  4:34 PM        START taking these medications    Details   bumetanide (BUMEX) 1 MG tablet Take 1 tablet by mouth in the morning., Disp-30 tablet, R-3Normal           MD Shnaia Burr MD  08/15/22 Antwon Carranza MD  09/28/22 0830

## 2022-08-07 NOTE — ED NOTES
Introduced self to patient and daughter. id'd patient by name and , reviewed allergies. Patient and daughter state they're waiting for medications and to be admitted. Patient denies pain. Patient with low normal pox, 90%. 2 liters n/c placed on patient, okay per verbal order by physician. Patient alert and oriented, stable vials, NAD.       Kassandra Pizano RN  22 [id number]

## 2022-08-08 ENCOUNTER — APPOINTMENT (OUTPATIENT)
Dept: ULTRASOUND IMAGING | Age: 87
DRG: 292 | End: 2022-08-08
Payer: MEDICARE

## 2022-08-08 LAB
ANION GAP SERPL CALCULATED.3IONS-SCNC: 14 MMOL/L (ref 7–16)
BUN BLDV-MCNC: 22 MG/DL (ref 6–23)
CALCIUM SERPL-MCNC: 8.5 MG/DL (ref 8.6–10.2)
CHLORIDE BLD-SCNC: 112 MMOL/L (ref 98–107)
CO2: 20 MMOL/L (ref 22–29)
CREAT SERPL-MCNC: 0.9 MG/DL (ref 0.7–1.2)
EKG ATRIAL RATE: 82 BPM
EKG Q-T INTERVAL: 348 MS
EKG QRS DURATION: 90 MS
EKG QTC CALCULATION (BAZETT): 423 MS
EKG R AXIS: 53 DEGREES
EKG T AXIS: -105 DEGREES
EKG VENTRICULAR RATE: 89 BPM
GFR AFRICAN AMERICAN: >60
GFR NON-AFRICAN AMERICAN: >60 ML/MIN/1.73
GLUCOSE BLD-MCNC: 153 MG/DL (ref 74–99)
POTASSIUM SERPL-SCNC: 3.7 MMOL/L (ref 3.5–5)
SODIUM BLD-SCNC: 146 MMOL/L (ref 132–146)

## 2022-08-08 PROCEDURE — 6370000000 HC RX 637 (ALT 250 FOR IP): Performed by: INTERNAL MEDICINE

## 2022-08-08 PROCEDURE — 2060000000 HC ICU INTERMEDIATE R&B

## 2022-08-08 PROCEDURE — 6360000002 HC RX W HCPCS: Performed by: INTERNAL MEDICINE

## 2022-08-08 PROCEDURE — 80048 BASIC METABOLIC PNL TOTAL CA: CPT

## 2022-08-08 PROCEDURE — 36415 COLL VENOUS BLD VENIPUNCTURE: CPT

## 2022-08-08 PROCEDURE — 93970 EXTREMITY STUDY: CPT

## 2022-08-08 PROCEDURE — 2580000003 HC RX 258: Performed by: INTERNAL MEDICINE

## 2022-08-08 PROCEDURE — 93971 EXTREMITY STUDY: CPT | Performed by: RADIOLOGY

## 2022-08-08 RX ADMIN — CEFTRIAXONE 1000 MG: 1 INJECTION, POWDER, FOR SOLUTION INTRAMUSCULAR; INTRAVENOUS at 21:04

## 2022-08-08 RX ADMIN — PANTOPRAZOLE SODIUM 40 MG: 40 TABLET, DELAYED RELEASE ORAL at 05:33

## 2022-08-08 RX ADMIN — FUROSEMIDE 20 MG: 10 INJECTION, SOLUTION INTRAMUSCULAR; INTRAVENOUS at 17:23

## 2022-08-08 RX ADMIN — TAMSULOSIN HYDROCHLORIDE 0.4 MG: 0.4 CAPSULE ORAL at 10:36

## 2022-08-08 RX ADMIN — Medication 10 ML: at 21:04

## 2022-08-08 RX ADMIN — ENOXAPARIN SODIUM 40 MG: 100 INJECTION SUBCUTANEOUS at 10:37

## 2022-08-08 RX ADMIN — FUROSEMIDE 20 MG: 10 INJECTION, SOLUTION INTRAMUSCULAR; INTRAVENOUS at 10:36

## 2022-08-08 RX ADMIN — Medication 10 ML: at 10:37

## 2022-08-08 NOTE — ED NOTES
Report called to floor, room and nurse ready, transport notified, vitals stable, nad.       Larry Dinh RN  08/07/22 7124

## 2022-08-08 NOTE — H&P
[address]                  [address] [facility],  [facility]                              HISTORY AND PHYSICAL    PATIENT NAME: Jovita Teague                     :        1919  MED REC NO:   [id number]                            ROOM:       8509  ACCOUNT NO:   [de-identified]                           ADMIT DATE: 2022  PROVIDER:     Rudy Fletcher DO    CHIEF COMPLAINT:  Leg edema, shortness of breath. HISTORY OF PRESENT ILLNESS:  The patient is a 8-year-old   male who presented to the emergency room with increasing leg edema and  shortness of breath. Diagnostic evaluation in the emergency room  revealed acute CHF and UTI. He was admitted for further evaluation and  treatment. PAST MEDICAL HISTORY:  Aortic stenosis, severe by echocardiogram;  emphysema; CVA; chronic anemia; arthritis; Cm's esophagus with  esophagitis; GERD; macular degeneration. PAST SURGICAL HISTORY:  Hemorrhoid surgery, tonsillectomy,  adenoidectomy, cataract surgery, ERCP, joint replacement, fracture  surgery, cholecystectomy. SOCIAL HISTORY:  No tobacco, no alcohol. REVIEW OF SYSTEMS:  Remarkable for above-stated chief complaint. ALLERGIES:  None known. MEDICATIONS PRIOR TO ADMISSION:  Aspirin, probiotic, Flomax, Prilosec,  vitamin E, multivitamin, PreserVision vitamin, vitamin D. PRIMARY CARE PHYSICIAN:  Radha Hummel MD    PHYSICAL EXAMINATION:  GENERAL APPEARANCE:  Physical exam reveals a 8-year-old  male  who is alert, cooperative and a fair historian. Much of the history  obtained from the patient's family members at the bedside. VITAL SIGNS:  On admission, temperature 97.4, pulse 98, respirations 16,  blood pressure 162/99. HEENT:  Head:  Normocephalic, atraumatic. Eyes:  Pupils equal and  reactive to light. Extraocular muscles intact. Fundi not well  visualized.   Nose:  No obstruction, polyp, or discharge noted.  Mouth:   Mucosa without lesion. Teeth edentulous. Pharynx noninjected without  exudate. NECK:  Supple. No JVD. No thyromegaly. There are bilateral radiated  murmurs into the carotids. HEART:  Regular rate and rhythm with grade 2/6 systolic murmur. LUNGS:  With decreased breath sounds in the bilateral bases. ABDOMEN:  Positive bowel sounds, soft, nontender. No rebound or  guarding. No hepatosplenomegaly. No masses. BACK:  With increased thoracic kyphosis. EXTREMITIES:  With 2+ edema in the bilateral lower extremities, right  greater than left. LYMPH NODES:  No adenopathy noted. SKIN:  Without rash or lesion. IMPRESSION:  Acute diastolic congestive heart failure, severe aortic  stenosis, UTI. PLAN:  Admit. Diurese cautiously. Serial labs. Obtain ultrasound of  the lower extremities. Discharge plan home when stable.         Ilana Gonzáles DO    D: 08/08/2022 7:46:40       T: 08/08/2022 7:49:07     [id number]  Job#: [id number]     Doc#: [id number]    CC:

## 2022-08-08 NOTE — CARE COORDINATION
Per notes here for Leg edema, shortness of breath. UTI. IV lasix bid and IV rocephin Obtain ultrasound of the lower extremities. Met with patient and daughter Azul Weber in room. He lives alone  in 1 story ranch and uses [employer]. His pcp is Dr Savita Castorena. Dme owned shower chair, bsc,2 walkers and ramp to enter the home. No history of hhc but has been to [facility] in past and would go there again if needed. Daughter states Dr Sandi Orosco states he needs aortic valve replacement. Await pt/ot evjonathan to assist with discharge plan. Electronically signed by Jessica Galo RN on 8/8/2022 at 2:31 PM

## 2022-08-09 LAB
ANION GAP SERPL CALCULATED.3IONS-SCNC: 11 MMOL/L (ref 7–16)
BUN BLDV-MCNC: 29 MG/DL (ref 6–23)
CALCIUM SERPL-MCNC: 7.8 MG/DL (ref 8.6–10.2)
CHLORIDE BLD-SCNC: 108 MMOL/L (ref 98–107)
CO2: 22 MMOL/L (ref 22–29)
CREAT SERPL-MCNC: 1.1 MG/DL (ref 0.7–1.2)
GFR AFRICAN AMERICAN: >60
GFR NON-AFRICAN AMERICAN: >60 ML/MIN/1.73
GLUCOSE BLD-MCNC: 83 MG/DL (ref 74–99)
ORGANISM: ABNORMAL
POTASSIUM SERPL-SCNC: 3.4 MMOL/L (ref 3.5–5)
SODIUM BLD-SCNC: 141 MMOL/L (ref 132–146)
URINE CULTURE, ROUTINE: ABNORMAL

## 2022-08-09 PROCEDURE — 6370000000 HC RX 637 (ALT 250 FOR IP): Performed by: INTERNAL MEDICINE

## 2022-08-09 PROCEDURE — 97165 OT EVAL LOW COMPLEX 30 MIN: CPT

## 2022-08-09 PROCEDURE — 97530 THERAPEUTIC ACTIVITIES: CPT

## 2022-08-09 PROCEDURE — 2060000000 HC ICU INTERMEDIATE R&B

## 2022-08-09 PROCEDURE — 80048 BASIC METABOLIC PNL TOTAL CA: CPT

## 2022-08-09 PROCEDURE — 2700000000 HC OXYGEN THERAPY PER DAY

## 2022-08-09 PROCEDURE — 97161 PT EVAL LOW COMPLEX 20 MIN: CPT

## 2022-08-09 PROCEDURE — 97535 SELF CARE MNGMENT TRAINING: CPT

## 2022-08-09 PROCEDURE — 6360000002 HC RX W HCPCS: Performed by: INTERNAL MEDICINE

## 2022-08-09 PROCEDURE — 36415 COLL VENOUS BLD VENIPUNCTURE: CPT

## 2022-08-09 PROCEDURE — 2580000003 HC RX 258: Performed by: INTERNAL MEDICINE

## 2022-08-09 RX ORDER — POTASSIUM CHLORIDE 20 MEQ/1
20 TABLET, EXTENDED RELEASE ORAL ONCE
Status: COMPLETED | OUTPATIENT
Start: 2022-08-09 | End: 2022-08-09

## 2022-08-09 RX ADMIN — Medication 10 ML: at 20:50

## 2022-08-09 RX ADMIN — PANTOPRAZOLE SODIUM 40 MG: 40 TABLET, DELAYED RELEASE ORAL at 05:28

## 2022-08-09 RX ADMIN — CEFTRIAXONE 1000 MG: 1 INJECTION, POWDER, FOR SOLUTION INTRAMUSCULAR; INTRAVENOUS at 20:49

## 2022-08-09 RX ADMIN — Medication 1 CAPSULE: at 09:08

## 2022-08-09 RX ADMIN — Medication 10 ML: at 09:09

## 2022-08-09 RX ADMIN — FUROSEMIDE 20 MG: 10 INJECTION, SOLUTION INTRAMUSCULAR; INTRAVENOUS at 17:40

## 2022-08-09 RX ADMIN — POTASSIUM CHLORIDE 20 MEQ: 1500 TABLET, EXTENDED RELEASE ORAL at 15:52

## 2022-08-09 RX ADMIN — ENOXAPARIN SODIUM 40 MG: 100 INJECTION SUBCUTANEOUS at 09:08

## 2022-08-09 RX ADMIN — FUROSEMIDE 20 MG: 10 INJECTION, SOLUTION INTRAMUSCULAR; INTRAVENOUS at 09:09

## 2022-08-09 RX ADMIN — TAMSULOSIN HYDROCHLORIDE 0.4 MG: 0.4 CAPSULE ORAL at 09:08

## 2022-08-09 NOTE — PLAN OF CARE
Patient's chart updated to reflect:      . - HF care plan, HF education points and HF discharge instructions.  -Orders: 2 gram sodium diet, daily weights, I/O.  -PCP and cardiology follow up appointments to be scheduled within 7 days of hospital discharge. -CHF education session will be provided to the patient prior to hospital discharge.     Jennifer Crouch RN RN, BSN  Heart Failure Navigator

## 2022-08-09 NOTE — PROGRESS NOTES
1 capsule, 1 capsule, Oral, Daily, Shelda Abed Malmer, DO, 1 capsule at 08/09/22 0908    tamsulosin (FLOMAX) capsule 0.4 mg, 0.4 mg, Oral, Daily, Shelda Abed Malmer, DO, 0.4 mg at 08/09/22 2520    sodium chloride flush 0.9 % injection 5-40 mL, 5-40 mL, IntraVENous, 2 times per day, Rell Thompson, DO, 10 mL at 08/09/22 3566    sodium chloride flush 0.9 % injection 5-40 mL, 5-40 mL, IntraVENous, PRN, Shelda Abed Malmer, DO    0.9 % sodium chloride infusion, , IntraVENous, PRN, Shelda Abed Malmer, DO    enoxaparin (LOVENOX) injection 40 mg, 40 mg, SubCUTAneous, Daily, Shelda Abed Malmer, DO, 40 mg at 08/09/22 0908    ondansetron (ZOFRAN-ODT) disintegrating tablet 4 mg, 4 mg, Oral, Q8H PRN **OR** ondansetron (ZOFRAN) injection 4 mg, 4 mg, IntraVENous, Q6H PRN, Shelda Abed Malmer, DO    polyethylene glycol (GLYCOLAX) packet 17 g, 17 g, Oral, Daily PRN, Shelda Abed Malmer, DO    acetaminophen (TYLENOL) tablet 650 mg, 650 mg, Oral, Q6H PRN **OR** acetaminophen (TYLENOL) suppository 650 mg, 650 mg, Rectal, Q6H PRN, Shelda Abed Malmer, DO    furosemide (LASIX) injection 20 mg, 20 mg, IntraVENous, BID, Shelda Abed Malmer, DO, 20 mg at 08/09/22 9649    cefTRIAXone (ROCEPHIN) 1,000 mg in sterile water 10 mL IV syringe, 1,000 mg, IntraVENous, Q24H, Shelda Abed Malmer, DO, 1,000 mg at 08/08/22 2104    pantoprazole (PROTONIX) tablet 40 mg, 40 mg, Oral, QAM AC, Shelda Abed Malmer, DO, 40 mg at 08/09/22 9586    ADULT DIET; Regular; Low Fat/Low Chol/High Fiber/THEE    US DUP LOWER EXTREMITIES BILATERAL VENOUS   Final Result   Within the visualized vessels there is no evidence for deep venous   thrombosis               XR CHEST PORTABLE   Final Result   Cardiomegaly with pulmonary edema and bilateral pleural effusions consistent   with congestive heart failure acute exacerbation.              Assessment:    Principal Problem:    Acute diastolic CHF (congestive heart failure) ([facility])  Active Problems:    Severe aortic stenosis  Resolved Problems:    * No resolved hospital problems. *      Plan:    Continue diuresis  Needing 2 L of oxygen  Cardiology to see        Blanca Matos MD  3:21 PM  8/9/2022    NOTE: This report was transcribed using voice recognition software.  Every effort was made to ensure accuracy; however, inadvertent transcription errors may be present

## 2022-08-09 NOTE — PROGRESS NOTES
Paged Dr. Ting Rodriguez for cardiology consult. Awaiting call back      Dr. Ting Rodriguez will be up to see patient.

## 2022-08-09 NOTE — PROGRESS NOTES
[address]                                                  Patient Name: Stephanie Ruiz    MRN: [id number]    : 1919    Room: [address] [place] [address]-A      Evaluating OT: Christina Guillory, [address] OTR/L; [id number]      Referring Provider: Erasmo Brown MD    Specific Provider Orders/Date: OT Eval and Treat 22      Diagnosis:  Acute diastolic CHF (congestive heart failure)    Surgery: none this admission     Pertinent Medical History:  has a past medical history of Anemia, Aortic stenosis, Arthritis, Cm's esophagus with esophagitis, Cancer (HCC), Cellulitis, Dizziness, Easy bruising, GERD (gastroesophageal reflux disease), H/O emphysema, Heart murmur, Heartburn, History of stress test, Lightheadedness, Macular degeneration, Passed out, Stroke ([address] and Unspecified cerebral artery occlusion with cerebral infarction.       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)     Assessment of current deficits:    [x] Functional mobility  [x]ADLs  [x] Strength               [x]Cognition    [x] Functional transfers   [x] IADLs         [x] Safety Awareness   [x]Endurance    [x] Fine Coordination              [x] Balance      [] Vision/perception   []Sensation     []Gross Motor Coordination  [] ROM  [] Delirium                   [] Motor Control     OT PLAN OF CARE   OT POC based on physician orders, patient diagnosis and results of clinical assessment    Frequency/Duration: 1-3 days/wk for 2 weeks PRN   Specific OT Treatment Interventions to include:   * Instruction/training on adapted ADL techniques and AE recommendations to as stated below and level of assistance needed prior to admission, this therapist believes that the patient would benefit from further skilled OT following hospital stay in an effort to increase safety, functional independence, and quality of life. Comment: Cleared by RN to see pt. Upon arrival patient lying supine in bed with daughter present and agreeable to OT session. At end of session, patient lying supine in bed with daughter present with call light and phone within reach, all lines and tubes intact. Overall patient demonstrated  decreased independence and safety during completion of ADL/functional transfer/mobility tasks. Pt would benefit from continued skilled OT to increase safety and independence with completion of ADL/IADL tasks for functional independence and quality of life. Treatment: Pt required vc's for proper technique/safety with hand placement/body mechanics/posture for bed mobility/ADLs/functional tranfers/mobility/ww management. Pt required vc's for sequencing/initiation of ADLs/functional transfers. Pt able to  sit EOB ~10  mins to increase core strength/balance/activity tolerance for ease with ADLs. Pt required increased time to complete ADLs/functional transfers due to management of multiple lines and overall fatigue/weakness. Pt ambulated to doorway and required return to EOB d/t increased SOB and difficulty recovering with standing rest break. Pt incontinent of bowel movement standing EOB required assist with pericare and changing soiled gown. Pt required skilled monitoring of SpO2 during session and education on energy conservation techniques. Pt required rest breaks during session and educated on pursed lip breathing. Pt appeared to have tolerated session well and appears motivated/cooperative/pleasant . Pt instructed on use of call light for assistance and fall prevention. Pt demo'ing fair understanding of education provided. Continue to educate.        Rehab Potential: Good  for established goals       LTG: maximize independence with ADLs to return to PLOF    Patient and/or family were instructed on functional diagnosis, prognosis/goals and OT plan of care. Demonstrated FAIR understanding. [] Malnutrition indicators have been identified and nursing has been notified to ensure a dietitian consult is ordered. Eval Complexity: Low     Evaluation time includes thorough review of current medical information, gathering information on past medical & social history & PLOF, completion of standardized testing, informal observation of tasks, consultation with other medical professions/disciplines, assessment of data & development of POC/goals. Time In: 1405  Time Out: 1455  Total Treatment Time: 35    Min Units   OT Eval Low 43524  x     OT Eval Medium 22168      OT Eval High 84123      OT Re-Eval [id number]       Therapeutic Ex 37725       Therapeutic Activities 31238  15  1   ADL/Self Care 51545  20  2   Orthotic Management 03231       Manual [id number]     Neuro Re-Ed 00438       Non-Billable Time          Evaluation Time additionally includes thorough review of current medical information, gathering information on past medical history/social history and prior level of function, interpretation of standardized testing/informal observation of tasks, assessment of data and development of plan of care and goals.           ANEUDY Schwartz OTR/L; [id number]

## 2022-08-09 NOTE — CONSULTS
Patient currently admitted with diagnosis of Diastolic heart failure. Patient was alert and oriented during the consultation with daughter Karishma Garcia at the bedside providing most answers to the questions. He was engaged and asked appropriate questions throughout the education session. She is agreeable to talking with Dr. Jessica Low regarding a referral to the CHF clinic, they also want to discuss AVR. PCP Dr. Higinio Tomlin 8/18/22 @ 1:30 PM.  No future appointments. Barriers to Care:  Contributing risk factors for Heart Failure are identified as advanced age. The patient is ordered:  Diet: ADULT DIET; Regular; Low Fat/Low Chol/High Fiber/THEE   Sodium controlled diet Yes  Fluid restriction daily ordered (fluid restriction recommended if patient is hyponatremic and/or diuretic is initiated or increased) No  FR:   Daily Weights: Patient Vitals for the past 96 hrs (Last 3 readings):   Weight   08/09/22 0420 150 lb (68 kg)   08/08/22 0516 150 lb 0.8 oz (68.1 kg)   08/07/22 2245 152 lb 4 oz (69.1 kg)     I/O:   Intake/Output Summary (Last 24 hours) at 8/9/2022 1329  Last data filed at 8/9/2022 1305  Gross per 24 hour   Intake 120 ml   Output 1550 ml   Net -1430 ml              We reviewed the introduction to Heart Failure, the HF zones, signs and symptoms to report on day 1 of onset, medications, medication compliance, the importance of obtaining daily weights, following a low sodium diet, reading food labels for the sodium content, keeping physician appointments, and smoking cessation. We discussed writing / tracking daily weights on a calendar / log because a 5 pound gain in 1 week can sneak up if you are not tracking it. I advised patient they can reduce the risk for Heart Failure exacerbations by modifying / controlling the risk factors.  We discussed self-managed care which includes the following:  to take medications as prescribed, report any intolerable side effects of medications to the cardiologist / doctor, do not just stop taking the medication; follow a cardiac heart healthy / low sodium diet; weigh yourself daily, exercise regularly- per doctor recommendation and not to smoke or use an excess amount of alcohol. We discussed calling the cardiologist / doctor with a weight gain of 3 pounds in one day or a total of 5 pounds or more in one week. Also, if you should have a significant weight loss of 3# or more in one day to call the doctor, they may need to decrease or hold the diuretic dose. On days you feels nauseated and not eating / drinking, having emesis or diarrhea,  informed to call the cardiologist  / doctor, they may need to decrease or hold diuretic to avoid dehydration. I stressed the importance of informing their cardiologist the first day of onset of any of the signs and symptoms in the \"Yellow Zone\" of the HF Zones. Patient verbalizes understanding. Greater than 30 minutes was spent educating patient. The Heart Failure Booklet given to the patient with additional patient education addressing:  What is Heart Failure? Things You Can Do to Live Well with HF  How to Take Your Medications  How to Eat Less Salt  [place] its Salt? Exercising Well with Heart Failure  Signs and symptoms of HF to report  Weight Yourself Each Day  Home Self Management- activity, weight tracking, taking medications as prescribed, meals /diet planning (sodium and fluid restriction), how to read food labels, keeping physician follow ups, smoking cessation, follow the Heart Failure Zones  The Heart Failure zones  Every Dose Every Day      Instructed  to call 911 if you have any of the following symptoms:       Struggling to breathe unrelieved with rest,     Having chest pain     Have confusion or cant think clearly          Tisha Salazar RN BSN, RN  Heart Failure [address] [place] [address],[address] (CHF) AHA GUIDELINES  (Must be completed for Primary Diagnosis CHF or History of CHF)    Discharge Plan:   I placed the Heart Failure Home Instructions in patient's discharge instructions. Per Heart Failure [facility]WT[facility], the patient should have a follow-up appointment made within 7 days of discharge.     New Diagnosis No    ECHO Results most recent:  Lab Results   Component Value Date    LVEF 60 02/05/2021                                        Social History     Tobacco Use   Smoking Status Never   Smokeless Tobacco Never   Tobacco Comments    quit smoking cigars a very long time ago        Immunization History   Administered Date(s) Administered    COVID-19, MODERNA BLUE border, Primary or Immunocompromised, (age 12y+), IM, 100 mcg/0.5mL 01/12/2021    COVID-19, PFIZER GRAY top, DO NOT Dilute, (age 15 y+), IM, 30 mcg/0.3 mL 06/04/2022    COVID-19, PFIZER PURPLE top, DILUTE for use, (age 15 y+), 30mcg/0.3mL 10/14/2021    Influenza Virus Vaccine 10/06/2015          Angiotensin-Converting-Enzyme (ACE) inhibitor ordered:  [] Yes  [x] No (specify contraindication):    [] Contraindicated  [] Hypotensive patient who was at immediate risk of cardiogenic shock  [] Hospitalized patient who experienced marked azotemia  [] Other Contraindications  [] Not Eligible  [] Not Tolerant  [] Patient Reason  [] System Reason  [] Other Reason    Angiotensin II receptor blockers (ARB) ordered:  [] Yes  [x] No (specify contraindication):    [] Contraindicated  [] Hypotensive patient who was at immediate risk of cardiogenic shock  [] Hospitalized patient who experienced marked azotemia  [] Other Contraindications    ARNI - Angiotensin Receptor Neprilysin Inhibitor ordered:  [] Yes  [x] No (specify contraindication):    [] ACE inhibitor use within the prior 36 hours  [] Allergy  [] Hyperkalemia  [] Hypotension  [] Renal dysfunction defined as creatinine > 2.5 mg/dL in men or > 2.0 mg/dL in women  [] Other Contraindications  [] Not Eligible  [] Not Tolerant  [] Patient Reason  []System Reason  []Other Reason      Beta Blocker ordered:    [] Yes  [x] No (specify contraindication):    [] Contraindicated  [] Asthma  [] Fluid Overload  [] Low Blood Pressure  [] Patient recently treated with an intravenous positive inotropic agent  [] Other Contraindications  [] Not Eligible  [] Not Tolerant  [] Patient Reason  [] System Reason    SGLT2 Inhibitor ordered:  [] Yes  [x] No (specify contraindication):    [] Contraindicated  [] Patient currently on dialysis  [] Ketoacidosis  [] Known hypersensitivity to the medication  [] Type I diabetes (not approved for use in patients with Type I diabetes due to increased risk of ketoacidosis)  [] Other Contraindications  [] Not Eligible  [] Not Tolerant  [] Patient Reason  [] System Reason  [] Other Reason    Aldosterone Antagonist ordered:  [] Yes  [x] No (specify contraindication):    [] Contraindicated  [] Allergy due to aldosterone receptor antagonist  [] Hyperkalemia  [] Renal dysfunction defined as creatinine >2.5 mg/dL in men or >2.0 mg/dL in women.   [] Other contraindications  [] Not Eligible  [] Not Tolerant  [] Patient Reason  [] System Reason  [] Other Reason

## 2022-08-09 NOTE — PLAN OF CARE

## 2022-08-09 NOTE — PROGRESS NOTES
Physical Therapy  Physical Therapy Initial Assessment     Name: Hina Ren  : 1919  MRN: [id number]      Date of Service: 2022    Evaluating PT:  Walker Kaye, PT, DPT [id number]    Room #:  4256/6245-E  Diagnosis:  Bilateral pleural effusion [B53]  Acute systolic CHF (congestive heart failure) ([facility] [address] [I50.21]  Urinary tract infection without hematuria, site unspecified [N39.0]  Acute on chronic heart failure, unspecified heart failure type ([facility] [address] [I50.9]  PMHx/PSHx:    Past Medical History:   Diagnosis Date    Anemia     Aortic stenosis     Arthritis     Cm's esophagus with esophagitis     Scope every 2 years with Dr. Telma Bray. Cancer (HCC)     melanoma x3    Cellulitis     Dizziness     Easy bruising     GERD (gastroesophageal reflux disease)     H/O emphysema     Heart murmur     Heartburn     History of stress test     Lightheadedness     Macular degeneration     Passed out     Stroke [facility])     Unspecified cerebral artery occlusion with cerebral infarction      Precautions:  Fall risk, Alarms, External cath, [place], TAPs  Equipment Needs:  TBD    SUBJECTIVE:    Pt lives alone in a one story home with ramp acces. Bed is on first floor and bath is on first floor. Daughter notes that she provides care >10 hours a day and lives 1 min away. Pt ambulated with rollator PTA. Equipment Owned: FWW, rollator    OBJECTIVE:   Initial Evaluation  Date: 22 Treatment Short Term/ Long Term   Goals   AM-PAC 6 Clicks 84/18     Was pt agreeable to Eval/treatment? Yes     Does pt have pain?  No     Bed Mobility  Rolling: Min A  Supine to sit: Min A  Sit to supine: Min A  Scooting: Min A  Independent   Transfers Sit to stand: Min A  Stand to sit: Min A  Stand pivot: Min A with [employer]  Supervision with rollator   Ambulation    20 feet with FWW Min A  >50 feet with rollator supervision   Stair negotiation: ascended and descended  NT  No functional goal required   ROM BUE:  Refer to OT note  BLE:  [facility] BUE:  Refer to OT note  BLE:  4/5  4+/5   Balance Sitting EOB:  SBA  Dynamic Standing:  Min A with rollator  Sitting EOB:  Independent  Dynamic Standing:  Supervision with rollator     Pt is A & O x 4  Sensation:  Pt denies numbness and tingling to extremities  Edema:  Mild B LEs    Vitals:  Blood Pressure at rest - Blood Pressure post session -   Heart Rate at rest - Heart Rate post session -   SPO2 at rest 97 SPO2 post session 94       Patient education  Pt educated on role and benefits of physical therapy, safety and technique for mobility    Patient response to education:   Pt verbalized understanding Pt demonstrated skill Pt requires further education in this area   x x x     ASSESSMENT:    Conditions Requiring Skilled Therapeutic Intervention:    [x]Decreased strength     []Decreased ROM  [x]Decreased functional mobility  [x]Decreased balance   [x]Decreased endurance   []Decreased posture  []Decreased sensation  []Decreased coordination   []Decreased vision  [x]Decreased safety awareness   []Increased pain       Comments:  Patient deemed medically stable prior to therapy evaluation. Patient was supine in bed upon therapy arrival. Patient provided consent to evaluation and PT, OT collaboration. They were able to execute all bed mobility with minimal physical assistance but did require increased time. Transfers performed with appropriate technique to AD. Ambulation stable with reduced miguel but was limited due to fatigue and notable decreased in O2 from 96-89%. O2 was increased to 6L on portable as purse lip breathing for >2min did not improve levels. Once returned to supine in bed, following bed changing, patient was returned to 4L of O2 and was able to stabilize at 94%. Patient left supine in bed with daughter present, with all needs met and call light in reach for safety.      Treatment:  Patient practiced and was instructed in the following treatment:    Bed mobility training - pt given verbal and tactile cues to facilitate proper sequencing and safety during rolling and supine>sit as well as provided with physical assistance to complete task   Rolling x2 bidirectional for changing bedding and optimal TAPs placement  Sitting EOB for >5 minutes for upright tolerance, postural awareness and BLE ROM  Transfer training - pt was given verbal and tactile cues to facilitate proper hand placement, technique and safety during sit to stand and stand to sit as well as provided with physical assistance. Gait training- pt was given verbal and tactile cues to facilitate appropriate use of FWW during ambulation as well as provided with physical assistance for safety/stability. Pt's/ family goals   1. Return to PLOF    Prognosis is good for reaching above PT goals. Patient and or family understand(s) diagnosis, prognosis, and plan of care.   Yes    PHYSICAL THERAPY PLAN OF CARE:    PT POC is established based on physician order and patient diagnosis     Referring provider/PT Order:    PT eval and treat  Start:  08/08/22 1445,   End:  08/08/22 1445,   ONE TIME,   Standing Count:  1 Occurrences,   Jennifer Colbert MD     Diagnosis:  Bilateral pleural effusion [Y26]  Acute systolic CHF (congestive heart failure) ([address] [I50.21]  Urinary tract infection without hematuria, site unspecified [N39.0]  Acute on chronic heart failure, unspecified heart failure type ([address] [I50.9]  Specific instructions for next treatment:  progress mobility    Current Treatment Recommendations:     [x] Strengthening to improve independence with functional mobility   [] ROM to improve independence with functional mobility   [x] Balance Training to improve static/dynamic balance and to reduce fall risk  [x] Endurance Training to improve activity tolerance during functional mobility   [] Transfer Training to improve safety and independence with all functional transfers   [x] Gait Training to improve gait mechanics, endurance and assess need for appropriate assistive device  [] Stair Training in preparation for safe discharge home and/or into the community   [] Positioning to prevent skin breakdown and contractures  [x] Safety and Education Training   [x] Patient/Caregiver Education   [] HEP  [] Other     PT long term treatment goals are located in above grid    Frequency of treatments: 2-5x/week x 1-2 weeks. Time in  1405  Time out  1455    Total Treatment Time  35 minutes     Evaluation Time includes thorough review of current medical information, gathering information on past medical history/social history and prior level of function, completion of standardized testing/informal observation of tasks, assessment of data and education on plan of care and goals.     CPT codes:  [x] Low Complexity PT evaluation 17607  [] Moderate Complexity PT evaluation 12587  [] High Complexity PT evaluation 64706  [] PT Re-evaluation 97971  [] Gait training 85310 - minutes  [] Manual therapy 18737 - minutes  [x] Therapeutic activities 92845 35 minutes  [] Therapeutic exercises 76112 - minutes  [] Neuromuscular reeducation 06613 - minutes     Chavo Carvajal, PT, DPT [id number]

## 2022-08-09 NOTE — DISCHARGE INSTRUCTIONS
HEART FAILURE  / CONGESTIVE HEART FAILURE  DISCHARGE INSTRUCTIONS:  GUIDELINES TO FOLLOW AT HOME    Self- Managed Care:     MEDICATIONS:  Take your medication as directed. If you are experiencing any side effects, inform your doctor, Do not stop taking any of your medications without letting your doctor know. Check with your doctor before taking any over-the-counter medications / herbal / or dietary supplements. They may interfere with your other medications. Do not take ibuprofen (Advil or Motrin) and naproxen (Aleve) without talking to your doctor first. They could make your heart failure worse. WEIGHT MONITORING:   Weigh yourself everyday (with the same scale) around the same time of the day and write it down. (you can chart them on a calendar or keep track of them on paper. Notify your doctor of a weight gain of 3 pounds or more in 1 day   OR a total of 5 pounds or more in 1 week    Take your weight record to your doctor visits  Also, the same goes if you loose more than 3# in one day, let your heart doctor know. DIET:   Cardiac heart healthy diet- Low saturated / low trans fat, no added salt, caffeine restricted, Low sodium diet-   No more than 2,000mg (2 grams) of salt / sodium per day (which equals to a little less than  a teaspoon of salt)  If your doctor wants you on a fluid restriction. ..it is usually recommended a fluid limit of 2,000cc -  Fluid restriction- 2,000 ml (milliliters) = 64 ounces = you can have 8 glasses of fluid per day (each glass 8 ounces)    Follow a low salt diet - avoid using salt at the table, avoid / limit use of canned soups, processed / packaged foods, salted snacks, olives and pickles. Do not use a salt substitute without checking with your doctor, they may contain a high amount of potassioum. (Mrs. Farhad Severino is safe to use).     Limit the use of alcohol       CALL YOUR DOCTOR THE FIRST DAY YOU NOTICE ANY OF THESE   SYMPTOMS:  You have a weight gain of 3 pounds or more in 1 day         OR 5 pounds or more in one week  More shortness of breath  More swelling of your stomach, legs, ankles or feet  Feeling more tired, No energy  Dry hacky cough  Dizziness  More chest pain / discomfort       (CALL 911 IF ANY OF THE FOLLOWING OCCURS  Chest pain (not relieved with nitroglycerine, if you have been prescribed this medication)  Severe shortness of breath  Faint / Pass out  Confusion / cannot think clearly  If symptoms get worse           SMOKING - TOBACCO USE:  * IF YOU SMOKE - STOP! Kick the habit. [address] Program is offered at [facility] and [address] [facility]. Call [phone] extension [phone] for more information. ACTIVITY:   (Ask your doctor when you will be able to return to work and before starting any exercise program.  Do not drive unless unless your doctor has given you permission to do so). Start light exercise. Even if you can only do a small amount, exercise will help you get stronger, have more energy, help manage your weight and decrease  stress. Walking is an easy way to get exercise. Start out slowly and  increase the amount you walk as tolerated  If you become short of breath, dizzy or have chest pain; stop, sit down, and rest.  If you feel \"wiped out\" the day after you exercise, walk at a slower pace or for a shorter distance. You can gradually increase the pace or amount of time. (Do not exercise right after a meal or in extreme temperatures, such as above 85 degrees, if the air is really humid, or wind chill is less than 20 degrees)                                             ADDITIONAL INFORMATION:  Avoid getting sick from colds and the flu. Stay away from friends or family that you know may have a contagious illness  Get plenty of rest   Get a flu shot each year. Get a pneumococcal vaccine shot.  If you have had one before, ask your doctor whether you need another dose. My Goal for Self-management of Heart Failure Includes 5 steps :    1. Notice a change in symptoms ( weight gain, short of breath, leg swelling, decreased activity level, bloating. ...)    2. Evaluate the change: (use the Heart Failure Zones )     3. Decide to take action: decide what your options are, such as: (call your doctor for an extra visit, take a prescribed medication, such as your water pill if your doctor has given you directions to do so, [address])    4. Come up with a strategy:  (now you call the doctor for advice / appointment. This is where you take action!!! Do not wait, catch the symptom early and treat it before it worsens. 5. Evaluate the response: The next day, check your Heart Failure Zones: are you in the GREEN ZONE (safe zone)? Worsening symptoms of YELLOW ZONE? Or have you moved to the RED ZONE and need to call 911 or go to the Emergency Room for evaluation? Call your doctor's office to update them on your symptoms of heart failure.

## 2022-08-10 LAB
ANION GAP SERPL CALCULATED.3IONS-SCNC: 11 MMOL/L (ref 7–16)
BUN BLDV-MCNC: 30 MG/DL (ref 6–23)
CALCIUM SERPL-MCNC: 8.5 MG/DL (ref 8.6–10.2)
CHLORIDE BLD-SCNC: 106 MMOL/L (ref 98–107)
CO2: 25 MMOL/L (ref 22–29)
CREAT SERPL-MCNC: 1.1 MG/DL (ref 0.7–1.2)
GFR AFRICAN AMERICAN: >60
GFR NON-AFRICAN AMERICAN: >60 ML/MIN/1.73
GLUCOSE BLD-MCNC: 79 MG/DL (ref 74–99)
LV EF: 68 %
LVEF MODALITY: NORMAL
POTASSIUM SERPL-SCNC: 4.2 MMOL/L (ref 3.5–5)
SODIUM BLD-SCNC: 142 MMOL/L (ref 132–146)

## 2022-08-10 PROCEDURE — 2700000000 HC OXYGEN THERAPY PER DAY

## 2022-08-10 PROCEDURE — 36415 COLL VENOUS BLD VENIPUNCTURE: CPT

## 2022-08-10 PROCEDURE — 6370000000 HC RX 637 (ALT 250 FOR IP): Performed by: INTERNAL MEDICINE

## 2022-08-10 PROCEDURE — 80048 BASIC METABOLIC PNL TOTAL CA: CPT

## 2022-08-10 PROCEDURE — 97530 THERAPEUTIC ACTIVITIES: CPT

## 2022-08-10 PROCEDURE — 2580000003 HC RX 258: Performed by: INTERNAL MEDICINE

## 2022-08-10 PROCEDURE — 2060000000 HC ICU INTERMEDIATE R&B

## 2022-08-10 PROCEDURE — 97535 SELF CARE MNGMENT TRAINING: CPT

## 2022-08-10 PROCEDURE — 6360000002 HC RX W HCPCS: Performed by: INTERNAL MEDICINE

## 2022-08-10 PROCEDURE — 93306 TTE W/DOPPLER COMPLETE: CPT

## 2022-08-10 RX ADMIN — Medication 1 CAPSULE: at 10:10

## 2022-08-10 RX ADMIN — TAMSULOSIN HYDROCHLORIDE 0.4 MG: 0.4 CAPSULE ORAL at 10:10

## 2022-08-10 RX ADMIN — Medication 10 ML: at 10:07

## 2022-08-10 RX ADMIN — FUROSEMIDE 20 MG: 10 INJECTION, SOLUTION INTRAMUSCULAR; INTRAVENOUS at 17:42

## 2022-08-10 RX ADMIN — Medication 10 ML: at 20:00

## 2022-08-10 RX ADMIN — PANTOPRAZOLE SODIUM 40 MG: 40 TABLET, DELAYED RELEASE ORAL at 05:23

## 2022-08-10 RX ADMIN — CEFTRIAXONE 1000 MG: 1 INJECTION, POWDER, FOR SOLUTION INTRAMUSCULAR; INTRAVENOUS at 19:59

## 2022-08-10 RX ADMIN — ENOXAPARIN SODIUM 40 MG: 100 INJECTION SUBCUTANEOUS at 10:04

## 2022-08-10 RX ADMIN — FUROSEMIDE 20 MG: 10 INJECTION, SOLUTION INTRAMUSCULAR; INTRAVENOUS at 10:08

## 2022-08-10 NOTE — CONSULTS
CARDIOLOGY CONSULTATION    Patient Name:  Marciano Giron    :  1919    Reason for Consultation:   Aortic valve disease    History of Present Illness: Marciano Giron presents to [place] [id number],  He does have known heart disease in the form of aortic stenosis based upon an echocardiogram obtained 6 years ago which was deemed moderate in severity however most recent echocardiogram suggests critical aortic stenosis. Over the past week or so he has noticed rapidly progressive shortness of breath with exertion and now even at rest.he also noted in his lower extremities were swelling. 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 in addition to known severe aortic stenosis he does have a previous history of an apparent TIA and was found to have bilateral vertebral artery stenosis as well. He is now admitted with acute decompensated heart failure  Past Medical History:   has a past medical history of Anemia, Aortic stenosis, Arthritis, Cm's esophagus with esophagitis, Cancer (HCC), Cellulitis, Dizziness, Easy bruising, GERD (gastroesophageal reflux disease), H/O emphysema, Heart murmur, Heartburn, History of stress test, Lightheadedness, Macular degeneration, Passed out, Stroke [facility]), and Unspecified cerebral artery occlusion with cerebral infarction. Surgical History:   has a past surgical history that includes Hemorrhoid surgery; Tonsillectomy and adenoidectomy; Cataract removal; Upper gastrointestinal endoscopy; ERCP (N/A, 3/11/2020); ERCP (3/11/2020); joint replacement; ERCP (N/A, 2020); ERCP (N/A, 2020); ERCP (N/A, 2/3/2021); ERCP (N/A, 2/3/2021); ERCP (N/A, 2/3/2021); Endoscopy, colon, diagnostic; fracture surgery (); Cholecystectomy, laparoscopic (N/A, 2021); ERCP (N/A, 2021); and ERCP (N/A, 2021). Social History:   reports that he has never smoked. He has never used smokeless tobacco. He reports that he does not drink alcohol and does not use drugs.      Family History:  family history includes Heart Attack in his brother; Heart Disease in his brother, father, and mother. Father also had cancer and mother  secondary to infirmities of old age. Medications:  Prior to Admission medications    Medication Sig Start Date End Date Taking? Authorizing Provider   aspirin 81 MG chewable tablet Take 81 mg by mouth in the morning. Yes Historical Provider, MD   Probiotic Product (CULTURELLE PROBIOTICS PO) Take by mouth   Yes Historical Provider, MD   tamsulosin (FLOMAX) 0.4 MG capsule TAKE ONE CAPSULE BY MOUTH AT BEDTIME 3/3/21   Historical Provider, MD   omeprazole (PRILOSEC) 40 MG delayed release capsule TAKE ONE CAPSULE BY MOUTH EVERY DAY 20   Historical Provider, MD   vitamin E 1000 units capsule Take 1,000 Units by mouth daily    Historical Provider, MD   Multiple Vitamins-Minerals (MULTIVITAMIN PO) Take 1 tablet by mouth daily     Historical Provider, MD   Multiple Vitamins-Minerals (PRESERVISION AREDS) CAPS Take 1 capsule by mouth daily    Historical Provider, MD   Vitamin D (CHOLECALCIFEROL) 1000 UNITS CAPS capsule Take 1,000 Units by mouth 2 times daily    Historical Provider, MD       Allergies:  Patient has no known allergies. Review of Systems:   Constitutional: there has been no unanticipated weight loss. There's been no significant change in energy level, sleep pattern or activity level. No fever chills or rigors. Eyes: No visual changes or diplopia. No scleral icterus. ENT: No Headaches, hearing loss or vertigo. No mouth sores or sore throat. No change in taste or smell. Cardiovascular: No chest discomfort, +dyspnea on exertion, deniespalpitations, loss of consciousness, no phlebitis, no claudication. Respiratory: No cough or wheezing, no sputum production. No hemoptysis, pleuritic pain. Gastrointestinal: No abdominal pain, appetite loss, + blood in stools. No change in bowel habits.  No hematemesis  Genitourinary: No dysuria, trouble voiding or hematuria. No nocturia or increased frequency. Musculoskeletal:  No gait disturbance, weakness or joint complaints. Integumentary: No rash or pruritis. Neurological: No headache, diplopia, change in muscle strength, numbness or tingling. No change in gait, balance, coordination, mood, affect, memory, mentation, behavior. Psychiatric: No anxiety or depression. Endocrine: No temperature intolerance. No excessive thirst, fluid intake, or urination. No tremor. Hematologic/Lymphatic: No abnormal bruising or bleeding, blood clots or swollen lymph nodes. Allergic/Immunologic: No nasal congestion or hives. Physical Examination:    Vital Signs: /66   Pulse 79   Temp 97.9 °F (36.6 °C) (Oral)   Resp 15   Ht 5' 9\" (1.753 m)   Wt 150 lb (68 kg)   SpO2 97%   BMI 22.15 kg/m²   General appearance: Well preserved, mesomorphic body habitus, alert, no distress. Skin: Skin color, texture, turgor normal. No rashes or lesions. No induration or tightening palpated. Head: Normocephalic. No masses, lesions, tenderness or abnormalities  Eyes: Conjunctivae/corneas clear. PERRL, EOMs intact. Sclera non icteric. Ears: External ears normal. Canals clear. TM's clear bilaterally. Hearing normal to finger rub. Nose/Sinuses: Nares normal. Septum midline. Mucosa normal. No drainage or sinus tenderness. Oropharynx: Lips, mucosa, and tongue normal. Oropharynx clear with no exudate seen. Neck: Neck supple and symmetric. No adenopathy. Thyroid symmetric, normal size, without nodules. Trachea is midline. Carotids brisk in upstroke without bruits, no abnormal JVP noted at 45°. Chest: Even excursion  Lungs: Lungs clear to auscultation bilaterally. No retractions or use of accessory muscles. No tactile vocal fremitus. No rhonchi, crackles or rales. Heart:  S1 > S2. Regular rhythm S4 gallop; grade 3/6 harsh systolic murmur heard at the apex as well as the second right intercostal space. No diastolic murmurs noted. Lia Orozco No rub, palpable thrill or heave noted. PMI 5th intercostal space midclavicular line. Abdomen: Abdomen soft, mildly protuberant, non-tender. BS normal. No masses, organomegaly. No hernia noted. Extremities: Extremities normal. No deformities, 1 - 2+ bilateral pretibial edema, mild skin discoloration. No cyanosis or clubbing noted to the nails. Peripheral pulses present 2+ upper extremities and present 1+  lower extremities. Musculoskeletal: Spine ROM normal. Muscular strength intact. Neuro: Cranial nerves intact. Motor: Strength 5/5 in all extremities. Reflexes 2+ in all extremities. No focal weakness. Sensory: grossly normal to touch. Coordination intact. Pertinent Labs:  CBC:   Recent Labs     22  1640   WBC 5.9   HGB 12.0*        BMP:  Recent Labs     22  1640 22  0609 22  0534    146 141   K 4.0 3.7 3.4*   * 112* 108*   CO2 20* 20* 22   BUN 21 22 29*   CREATININE 1.0 0.9 1.1   GLUCOSE 124* 153* 83   LABGLOM >60 >60 >60     ABGs: No results found for: PH, PO2, PCO2  INR: No results for input(s): INR in the last 72 hours. PRO-BNP:   Lab Results   Component Value Date    PROBNP 1,232 (H) 2022    PROBNP 1,097 (H) 2021      Cardiac Injury Profile:   No results for input(s): CKTOTAL, CKMB, CKMBINDEX, TROPONINI in the last 72 hours.      Lipid Profile:   Lab Results   Component Value Date/Time    TRIG 74 2021 02:00 PM    HDL 58 2021 02:00 PM    LDLCALC 97 2021 02:00 PM    CHOL 170 2021 02:00 PM      Thyroid:   Lab Results   Component Value Date    TSH 2.330 2021      Hemoglobin A1C: No components found for: HGBA1C   ECG:  See report    Radiology:    US DUP LOWER EXTREMITIES BILATERAL VENOUS    Result Date: 2022  Patient MRN:  [id number] : 1919 Age: 80 years Gender: Male Order Date:  2022 8:59 AM EXAM: US DUP LOWER EXTREMITIES BILATERAL VENOUS NUMBER OF IMAGES:  54 INDICATION:  leg edema leg edema What reading provider will be dictating this exam?->[facility] COMPARISON: None Within the visualized vessels, there is no evidence for deep venous thrombosis There is good compressibility, there is good augmentation, there is good color flow. Within the visualized vessels there is no evidence for deep venous thrombosis           Assessment:    Principal Problem:    Acute diastolic CHF (congestive heart failure) (HCC)  Active Problems:    Severe aortic stenosis  Resolved Problems:    * No resolved hospital problems. *      Plan:  Based upon Mr. Stanton's clinical presentation it would appear that his acute heart failure is secondary to his underlying severe aortic stenosis. What is unknown presently is his left ventricular function now that he is hemodynamically decompensated. Based upon his preserved cognitive status he may potentially be a candidate for catheter-based intervention at this time. I have spent more than 55minutes face to face with Adonay Thapa reviewing notes and laboratory data with greater than 50% of this time instructing and counseling the patient regarding my findings and recommendations and I have answered all questions as posed to me by Mr. Stanton. Thank you, Shahana Feng MD and Hayley Venegas MD for allowing me to consult in the care of this patient. Bob Pascual DO DO, FACP, [facility], [facility]    NOTE:  This report was transcribed using voice recognition software. Every effort was made to ensure accuracy; however, inadvertent computerized transcription errors may be present.

## 2022-08-10 NOTE — CARE COORDINATION
On echo list today. Currently on 3 L Oxygen spo2  96% pt eval 16/24  ot 15/24. Per  Cardiology consult- (appear that his acute heart failure is secondary to his underlying severe aortic stenosis. What is unknown presently is his left ventricular function now that he is hemodynamically decompensated. Based upon his preserved cognitive status he may potentially be a candidate for catheter-based intervention at this time.) No history of [facility] but has been to [facility] in past and would go there again if needed. Cm/sw to follow. Electronically signed by Monica Rivera RN on 8/10/2022 at 11:05 AM

## 2022-08-10 NOTE — PLAN OF CARE
Problem: Chronic Conditions and Co-morbidities  Goal: Patient's chronic conditions and co-morbidity symptoms are monitored and maintained or improved  Outcome: Progressing     Problem: Discharge Planning  Goal: Discharge to home or other facility with appropriate resources  Outcome: Progressing     Problem: Safety - Adult  Goal: Free from fall injury  Outcome: Progressing     Problem: ABCDS Injury Assessment  Goal: Absence of physical injury  Outcome: Progressing     Problem: Skin/Tissue Integrity  Goal: Absence of new skin breakdown  Description: 1. Monitor for areas of redness and/or skin breakdown  2. Assess vascular access sites hourly  3. Every 4-6 hours minimum:  Change oxygen saturation probe site  4. Every 4-6 hours:  If on nasal continuous positive airway pressure, respiratory therapy assess nares and determine need for appliance change or resting period.   Outcome: Progressing     Problem: Cardiovascular - Adult  Goal: Maintains optimal cardiac output and hemodynamic stability  Outcome: Progressing

## 2022-08-10 NOTE — PROGRESS NOTES
Occupational Therapy  OT BEDSIDE TREATMENT NOTE   [facility] [address]  [address] [place] [address]       Date:8/10/2022  Patient Name: Jenifer Tavares  MRN: [id number]  : 1919  Room: [address] [place] [address]-A     Per OT Eval:    Evaluating OT: Kushal Emanuel, [address] OTR/L; [id number]       Referring Provider: Zoe James MD    Specific Provider Orders/Date: OT Eval and Treat 22       Diagnosis:  Acute diastolic CHF (congestive heart failure)    Surgery: none this admission     Pertinent Medical History:  has a past medical history of Anemia, Aortic stenosis, Arthritis, Cm's esophagus with esophagitis, Cancer ([address] Cellulitis, Dizziness, Easy bruising, GERD (gastroesophageal reflux disease), H/O emphysema, Heart murmur, Heartburn, History of stress test, Lightheadedness, Macular degeneration, Passed out, Stroke ([address] and Unspecified cerebral artery occlusion with cerebral infarction.        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)      Assessment of current deficits:    [x] Functional mobility            [x]ADLs           [x] Strength                  [x]Cognition   [x] Functional transfers          [x] IADLs         [x] Safety Awareness   [x]Endurance   [x] Fine Coordination                         [x] Balance      [] Vision/perception   []Sensation      []Gross Motor Coordination             [] ROM           [] Delirium                   [] Motor Control     OT PLAN OF CARE   OT POC based on physician orders, patient diagnosis and results of clinical assessment     Frequency/Duration: 1-3 days/wk for 2 weeks PRN  Specific OT Treatment Interventions to include:   * Instruction/training on adapted ADL techniques and AE recommendations to increase functional independence within precautions       * Training on energy conservation strategies, correct breathing pattern and techniques to improve independence/tolerance for self-care routine  * Functional transfer/mobility training/DME recommendations for increased independence, safety, and fall prevention  * Patient/Family education to increase follow through with safety techniques and functional independence  * Recommendation of environmental modifications for increased safety with functional transfers/mobility and ADLs  * Therapeutic exercise to improve motor endurance, ROM, and functional strength for ADLs/functional transfers  * Therapeutic activities to facilitate/challenge dynamic balance, stand tolerance for increased safety and independence with ADLs     Home Living: Pt lives alone in 1 story home with ramp to enter.    Bathroom setup: walk in shower with shower chair   [place] [facility] [place] 2, rollator     Family Support/Assist: daughter lives \"1 minute away\" and visits/checks on patient daily     Prior Level of Function: ind with ADLs  Mod ind with IADLs - however daughter assists with groceries  ambulated with rollator prior  Driving: no - d/t macular degeneration   Occupation: [employer], retired [employer]      Pain Level: none    Cognition: A&O: 4/4; Follows multi step directions, pleasant & cooperative              Memory:  good              Sequencing:  fair              Problem solving:  fair              Judgement/safety:  fair                Functional Assessment:  AM-PAC Daily Activity Raw Score: 15/24    Initial Eval Status  Date: 8/9/22 Treatment Status  Date:  8/10/22 STGs = LTGs  Time frame: 10-14 days   Feeding Stand by Assist   Tray set up  Supervision  Seated in chair  Independent    Grooming Stand by Assist  Limited d/t decreased BUE ROM  Educated on task modifications seated EOB  Supervision  Seated with simple grooming tasks  Independent UB Dressing Minimal Assist   Doff soiled gown and jesus new gown lying supine Min A   assist around back with gown seated at EOB Independent    LB Dressing Moderate Assist   Jesus socks seated EOB  Dizziness with bending over unable to simulate figure 4 technique d/t limited hip flexion  Mod A  Limited by ROM, rigid, assist required to thread legs into brief & pants  Independent    Bathing Moderate Assist  Educated on continued use of shower chair for safety d/t decreased activity tolerance and dynamic standing balance   Mod A  simulated Independent    Toileting Moderate Assist   External male catheter (wears depends at baseline)  Incontinent of bowels during session  Limited functional reach for pericare Max A  Baseline incontinent   Independent    Bed Mobility Log Roll: Min A  Supine to sit: Minimal Assist   Sit to supine: Minimal Assist   SBA  Supine to sit  Supine to sit: Independent   Sit to supine: Independent    Functional Transfers Sit to stand: Min A   Stand to sit:Min A  Stand pivot: Min A with ww  Commode: NT SBA from EOB  Min A from chair     Cues for hand placement & technique   Sit to stand: Mod Ind with AD   Stand to sit:Mod Ind with AD   Stand pivot: Mod Ind with AD   Commode: Mod Ind with AD     Functional Mobility Min A with ww  From EOB <> Doorway  Limited d/t SOB and destating SpO2       CGA  With walker, short household distances with assist needed for O2 line management Mod Ind with AD     Balance Sitting:     Static - SBA     Dynamic - SBA  Standing: Min A with ww  Sitting: Supervision  Standing: CGA  With walker  Sitting:  Static: IND  Dynamic: IND  Standing:  Mod Ind with AD   Activity Tolerance Fair  Limited d/t SOB with light ADLs  Fair   Good   Visual/  Perceptual Glasses: no macular degeneration     pt unable to read clock on wall - d/t visual deficits             Vitals    SpO2 at rest on 3L: 97%  SpO2 following functional mobility: 90% required increase to 4L NC to recover to 94%  SpO2 end of session on 3L: 95%  No SOB  98-96% on 3L            BUE  ROM/Strength/  Fine motor Coordination Hand dominance: Right     RUE: shoulder flexion ~70 degrees     Strength: 3-/5      Strength: FAIR     Coordination:  FAIR     LUE: shoulder flexion ~45 degrees     Strength: 3-/5      Strength: FAIR     Coordination: FAIR  increase BUE muscle strength for improved indep with functional transfers        Education:  Pt was educated through out treatment regarding proper technique & safety with bed mobility, functional transfers & mobility, O2 line management, energy conservation & ADL compensatory strategies to ease tasks, improve safety & prevent falls to return home safely. Daughter present through out session, educated through out session with Good understanding & reports pt has AE at home but pt reports he uses a stool & is able to complete LB dressing with no difficult. Comments: Upon arrival pt was in bed & agreeable for therapy, daughter present. At end of session pt was seated in chair daughter present, all lines and tubes intact, call light within reach. Pt has made Good progress towards set goals. Continue with current plan of care      Treatment Time In: 1:40            Treatment Time Out: 2:30           Treatment Charges: Mins Units   Ther Ex  31469     Manual Therapy 43373     Thera Activities 58219 20 1   ADL/Home Mgt 00229 30 2   Neuro Re-ed 20113     Group Therapy      Orthotic manage/training  97143     Non-Billable Time     Total Timed Treatment 50 3       Julisa CALLES  [address] [place] [address], [address] [place] [address]

## 2022-08-10 NOTE — PROGRESS NOTES
ocuvite-lutein multivitamin 1 capsule, 1 capsule, Oral, Daily, Darío Littlemer, DO, 1 capsule at 08/10/22 1010    tamsulosin (FLOMAX) capsule 0.4 mg, 0.4 mg, Oral, Daily, Darío Littlemer, DO, 0.4 mg at 08/10/22 1010    sodium chloride flush 0.9 % injection 5-40 mL, 5-40 mL, IntraVENous, 2 times per day, Toby Bardales DO, 10 mL at 08/10/22 1007    sodium chloride flush 0.9 % injection 5-40 mL, 5-40 mL, IntraVENous, PRN, Darío Littlemer, DO    0.9 % sodium chloride infusion, , IntraVENous, PRN, Darío Littlemer, DO    enoxaparin (LOVENOX) injection 40 mg, 40 mg, SubCUTAneous, Daily, Darío Mishra, DO, 40 mg at 08/10/22 1004    ondansetron (ZOFRAN-ODT) disintegrating tablet 4 mg, 4 mg, Oral, Q8H PRN **OR** ondansetron (ZOFRAN) injection 4 mg, 4 mg, IntraVENous, Q6H PRN, Darío Littlemer, DO    polyethylene glycol (GLYCOLAX) packet 17 g, 17 g, Oral, Daily PRN, Darío Littlemer, DO    acetaminophen (TYLENOL) tablet 650 mg, 650 mg, Oral, Q6H PRN **OR** acetaminophen (TYLENOL) suppository 650 mg, 650 mg, Rectal, Q6H PRN, Darío Littlemer, DO    furosemide (LASIX) injection 20 mg, 20 mg, IntraVENous, BID, Darío Littlemer, DO, 20 mg at 08/10/22 1008    cefTRIAXone (ROCEPHIN) 1,000 mg in sterile water 10 mL IV syringe, 1,000 mg, IntraVENous, Q24H, Darío Mishra, DO, 1,000 mg at 08/09/22 2049    pantoprazole (PROTONIX) tablet 40 mg, 40 mg, Oral, QAM AC, Darío Littlemer, DO, 40 mg at 08/10/22 8319    ADULT DIET; Regular; Low Fat/Low Chol/High Fiber/THEE    US DUP LOWER EXTREMITIES BILATERAL VENOUS   Final Result   Within the visualized vessels there is no evidence for deep venous   thrombosis               XR CHEST PORTABLE   Final Result   Cardiomegaly with pulmonary edema and bilateral pleural effusions consistent   with congestive heart failure acute exacerbation.              Assessment:    Principal Problem:    Acute diastolic CHF (congestive heart failure) (HCC)  Active Problems:    Severe aortic stenosis  Resolved Problems:    * No resolved hospital problems. *      Plan:    Continue diuresis  Wean oxygen as able  Echocardiogram awaited    Kiran Smiley MD  1:14 PM  8/10/2022    NOTE: This report was transcribed using voice recognition software.  Every effort was made to ensure accuracy; however, inadvertent transcription errors may be present

## 2022-08-11 VITALS
WEIGHT: 144.8 LBS | RESPIRATION RATE: 17 BRPM | OXYGEN SATURATION: 94 % | BODY MASS INDEX: 21.45 KG/M2 | DIASTOLIC BLOOD PRESSURE: 72 MMHG | HEART RATE: 77 BPM | TEMPERATURE: 97.2 F | SYSTOLIC BLOOD PRESSURE: 111 MMHG | HEIGHT: 69 IN

## 2022-08-11 PROCEDURE — 6370000000 HC RX 637 (ALT 250 FOR IP): Performed by: INTERNAL MEDICINE

## 2022-08-11 PROCEDURE — 2580000003 HC RX 258: Performed by: INTERNAL MEDICINE

## 2022-08-11 PROCEDURE — 97530 THERAPEUTIC ACTIVITIES: CPT

## 2022-08-11 PROCEDURE — 2700000000 HC OXYGEN THERAPY PER DAY

## 2022-08-11 PROCEDURE — 6360000002 HC RX W HCPCS: Performed by: INTERNAL MEDICINE

## 2022-08-11 RX ORDER — BUMETANIDE 1 MG/1
1 TABLET ORAL DAILY
Status: DISCONTINUED | OUTPATIENT
Start: 2022-08-12 | End: 2022-08-11 | Stop reason: HOSPADM

## 2022-08-11 RX ORDER — BUMETANIDE 1 MG/1
1 TABLET ORAL DAILY
Qty: 30 TABLET | Refills: 3 | Status: SHIPPED | OUTPATIENT
Start: 2022-08-12

## 2022-08-11 RX ORDER — FUROSEMIDE 20 MG/1
20 TABLET ORAL DAILY
Qty: 60 TABLET | Refills: 3 | Status: SHIPPED | OUTPATIENT
Start: 2022-08-11 | End: 2022-08-11 | Stop reason: HOSPADM

## 2022-08-11 RX ADMIN — ENOXAPARIN SODIUM 40 MG: 100 INJECTION SUBCUTANEOUS at 08:33

## 2022-08-11 RX ADMIN — TAMSULOSIN HYDROCHLORIDE 0.4 MG: 0.4 CAPSULE ORAL at 08:32

## 2022-08-11 RX ADMIN — FUROSEMIDE 20 MG: 10 INJECTION, SOLUTION INTRAMUSCULAR; INTRAVENOUS at 08:33

## 2022-08-11 RX ADMIN — Medication 1 CAPSULE: at 08:32

## 2022-08-11 RX ADMIN — PANTOPRAZOLE SODIUM 40 MG: 40 TABLET, DELAYED RELEASE ORAL at 06:08

## 2022-08-11 RX ADMIN — Medication 10 ML: at 08:33

## 2022-08-11 NOTE — PROGRESS NOTES
Dr. Fortino Richmond stated ok to discharge from his standpoint, speaking with daughter via telephone currently

## 2022-08-11 NOTE — CARE COORDINATION
Discharge order noted. Met with daughter Adi Shaffer in room. Pulse ox noted -does not require oxygen, Daughter requesting home with [facility] - referral to [facility]. Daughter will transport. Await call back /determination. Electronically signed by Jennifer Faustin RN on 8/11/2022 at 12:09 PM    Per Nola Stoddard- accepted for [facility]. Electronically signed by Jennifer Faustin RN on 8/11/2022 at 1:43 PM    Order for oxygen noted. [facility] and info given and ntfd of discharge order. Will bring transport tank to room for home. Electronically signed by Jennifer Faustin RN on 8/11/2022 at 3:51 PM

## 2022-08-11 NOTE — PROGRESS NOTES
CLINICAL PHARMACY NOTE: MEDS TO BEDS    Total # of Prescriptions Filled: 1   The following medications were delivered to the patient:  Lasix 20mg    Additional Documentation:  Delivered to patient and his daughter JETHRO GALINDO) 8/11/22 @12:14pm

## 2022-08-11 NOTE — PROGRESS NOTES
Subjective:    Chief complaint:    Doing better  Apparently ambulated with out difficulty  Objective:    /72   Pulse 77   Temp 97.2 °F (36.2 °C) (Temporal)   Resp 17   Ht 5' 9\" (1.753 m)   Wt 144 lb 12.8 oz (65.7 kg)   SpO2 95%   BMI 21.38 kg/m²   General : Awake ,alert,no distress. Heart:  RRR, no murmurs, gallops, or rubs. Lungs:  CTA bilaterally, no wheeze, rales or rhonchi  Abd: bowel sounds present, nontender, nondistended, no masses  Extrem:  No clubbing, cyanosis, or edema    CBC:   Lab Results   Component Value Date/Time    WBC 5.9 08/07/2022 04:40 PM    RBC 3.90 08/07/2022 04:40 PM    HGB 12.0 08/07/2022 04:40 PM    HCT 36.6 08/07/2022 04:40 PM    MCV 93.8 08/07/2022 04:40 PM    MCH 30.8 08/07/2022 04:40 PM    MCHC 32.8 08/07/2022 04:40 PM    RDW 15.4 08/07/2022 04:40 PM     08/07/2022 04:40 PM    MPV 9.8 08/07/2022 04:40 PM     BMP:    Lab Results   Component Value Date/Time     08/10/2022 05:33 AM    K 4.2 08/10/2022 05:33 AM    K 4.0 08/07/2022 04:40 PM     08/10/2022 05:33 AM    CO2 25 08/10/2022 05:33 AM    BUN 30 08/10/2022 05:33 AM    LABALBU 3.5 08/07/2022 04:40 PM    LABALBU 4.3 08/03/2011 07:55 AM    CREATININE 1.1 08/10/2022 05:33 AM    CALCIUM 8.5 08/10/2022 05:33 AM    GFRAA >60 08/10/2022 05:33 AM    LABGLOM >60 08/10/2022 05:33 AM    GLUCOSE 79 08/10/2022 05:33 AM    GLUCOSE 90 08/03/2011 07:55 AM     PT/INR:    Lab Results   Component Value Date/Time    PROTIME 13.1 03/10/2020 11:57 AM    INR 1.2 03/10/2020 11:57 AM     Troponin:    Lab Results   Component Value Date/Time    TROPONINI <0.01 02/01/2021 12:33 PM       No results for input(s): LABURIN in the last 72 hours. No results for input(s): BC in the last 72 hours. No results for input(s): Eliu Edenton in the last 72 hours.       Current Facility-Administered Medications:     perflutren lipid microspheres (DEFINITY) injection 1.65 mg, 1.5 mL, IntraVENous, ONCE PRN, Tosha Masker, DO    ocuvite-lutein multivitamin 1 capsule, 1 capsule, Oral, Daily, Yolanda Finger Malmer, DO, 1 capsule at 08/11/22 8375    tamsulosin (FLOMAX) capsule 0.4 mg, 0.4 mg, Oral, Daily, Yolanda Finger Malmer, DO, 0.4 mg at 08/11/22 1130    sodium chloride flush 0.9 % injection 5-40 mL, 5-40 mL, IntraVENous, 2 times per day, Harden Abby, DO, 10 mL at 08/11/22 5486    sodium chloride flush 0.9 % injection 5-40 mL, 5-40 mL, IntraVENous, PRN, Yolanda Finger Malmer, DO    0.9 % sodium chloride infusion, , IntraVENous, PRN, Yolanda Finger Malmer, DO    enoxaparin (LOVENOX) injection 40 mg, 40 mg, SubCUTAneous, Daily, Yolanda Finger Malmer, DO, 40 mg at 08/11/22 0833    ondansetron (ZOFRAN-ODT) disintegrating tablet 4 mg, 4 mg, Oral, Q8H PRN **OR** ondansetron (ZOFRAN) injection 4 mg, 4 mg, IntraVENous, Q6H PRN, Yolanda Finger Malmer, DO    polyethylene glycol (GLYCOLAX) packet 17 g, 17 g, Oral, Daily PRN, Yolanda Finger Malmer, DO    acetaminophen (TYLENOL) tablet 650 mg, 650 mg, Oral, Q6H PRN **OR** acetaminophen (TYLENOL) suppository 650 mg, 650 mg, Rectal, Q6H PRN, Yolanda Finger Malmer, DO    furosemide (LASIX) injection 20 mg, 20 mg, IntraVENous, BID, Yolanda Finger Malmer, DO, 20 mg at 08/11/22 8981    cefTRIAXone (ROCEPHIN) 1,000 mg in sterile water 10 mL IV syringe, 1,000 mg, IntraVENous, Q24H, Yolanda Finger Malmer, DO, 1,000 mg at 08/10/22 1959    pantoprazole (PROTONIX) tablet 40 mg, 40 mg, Oral, QAM AC, Yolanda Finger Malmer, DO, 40 mg at 08/11/22 8949    ADULT DIET; Regular; Low Fat/Low Chol/High Fiber/THEE    US DUP LOWER EXTREMITIES BILATERAL VENOUS   Final Result   Within the visualized vessels there is no evidence for deep venous   thrombosis               XR CHEST PORTABLE   Final Result   Cardiomegaly with pulmonary edema and bilateral pleural effusions consistent   with congestive heart failure acute exacerbation.              Assessment:    Principal Problem:    Acute diastolic CHF (congestive heart failure) (HCC)  Active Problems:    Severe aortic stenosis  Resolved Problems:    * No resolved hospital problems. *      Plan:    Diuresing well  ?dc home today if ok with cardiology  Echo results noted  BP soft off and on  Dc on low dose jerel Rae MD  9:16 AM  8/11/2022    NOTE: This report was transcribed using voice recognition software.  Every effort was made to ensure accuracy; however, inadvertent transcription errors may be present

## 2022-08-11 NOTE — PROGRESS NOTES
Physical Therapy  Physical Therapy Treatment    Name: Marifer Anneain  : 1919  MRN: [id number]      Date of Service: 2022    Evaluating PT:  Piedad Underwood, PT, DPT [id number]    Room #:  2683/7395-X  Diagnosis:  Bilateral pleural effusion [E38]  Acute systolic CHF (congestive heart failure) ([facility] [I50.21]  Urinary tract infection without hematuria, site unspecified [N39.0]  Acute on chronic heart failure, unspecified heart failure type ([facility] [I50.9]  PMHx/PSHx:    Past Medical History:   Diagnosis Date    Anemia     Aortic stenosis     Arthritis     Cm's esophagus with esophagitis     Scope every 2 years with Dr. Nixon Arrieta. Cancer (HCC)     melanoma x3    Cellulitis     Dizziness     Easy bruising     GERD (gastroesophageal reflux disease)     H/O emphysema     Heart murmur     Heartburn     History of stress test     Lightheadedness     Macular degeneration     Passed out     Stroke [facility])     Unspecified cerebral artery occlusion with cerebral infarction      Precautions:  Fall risk, Alarms, External cath, [place], TAPs  Equipment Needs:  TBD    SUBJECTIVE:    Pt lives alone in a one story home with ramp acces. Bed is on first floor and bath is on first floor. Daughter notes that she provides care >10 hours a day and lives 1 min away. Pt ambulated with rollator PTA. Equipment Owned: FWW, rollator    OBJECTIVE:   Initial Evaluation  Date: 22 Treatment  22 Short Term/ Long Term   Goals   AM-PAC 6 Clicks 60/47 28/45    Was pt agreeable to Eval/treatment? Yes Yes    Does pt have pain?  No No    Bed Mobility  Rolling: Min A  Supine to sit: Min A  Sit to supine: Min A  Scooting: Min A Supine to sit: SBA  Scooting: SBA Independent   Transfers Sit to stand: Min A  Stand to sit: Min A  Stand pivot: Min A with [employer] Sit to stand: Min A  Stand to sit: Min A  Stand pivot: Min A with rollator Supervision with rollator   Ambulation    20 feet with FWW Min A 75ft with rollator CGA >50 feet with rollator supervision   Stair negotiation: ascended and descended  NT  No functional goal required   ROM BUE:  Refer to OT note  BLE:  WFL     Strength BUE:  Refer to OT note  BLE:  4/5  4+/5   Balance Sitting EOB:  SBA  Dynamic Standing:  Min A with rollator Sitting EOB:  SBA  Dynamic Standing:  Min A to CGA with rollator Sitting EOB:  Independent  Dynamic Standing:  Supervision with rollator     Pt is A & O x 4  Sensation:  Pt denies numbness and tingling to extremities  Edema:  Mild B LEs    Vitals:  Blood Pressure at rest - Blood Pressure post session -   Heart Rate at rest - Heart Rate post session -   SPO2 at rest 96 RA SPO2 post session 94 RA       Patient education  Pt educated on role and benefits of physical therapy, safety and technique for mobility    Patient response to education:   Pt verbalized understanding Pt demonstrated skill Pt requires further education in this area   x x x     ASSESSMENT:    Conditions Requiring Skilled Therapeutic Intervention:    [x]Decreased strength     []Decreased ROM  [x]Decreased functional mobility  [x]Decreased balance   [x]Decreased endurance   []Decreased posture  []Decreased sensation  []Decreased coordination   []Decreased vision  [x]Decreased safety awareness   []Increased pain       Comments:  Patient deemed medically stable prior to therapy treatment. Patient was supine in bed without NC on upon therapy arrival with support staff present to assist in adjusting external catheter. Due to mild saturation patient was cleaned, bedding and TAPs replaced. Patient mentioned that he independently removed the oxygen to wipe his nose, but was WNLs on RA. To assess tolerance to activity, patient performed all bed mobility and transfers on RA without desat. Following increased ambulation distance patient did drop to 90%, but was able to return to 94% with purse lip breathing and a standing break.  Patient skillfully positioned with Les elevated, upright in chair with waffle cushion, with all needs met and call light in reach for safety. Oxygen 94% on RA at conclusion and was left off. Nursing notified and consented. Patient asked for assistance with adjusting when performing evaluation in bed B. Patient assisted with removal of ottoman, adjustement of waffle cushion, pillows and tray. Left upright with call light for future concerns. Treatment:  Patient practiced and was instructed in the following treatment:    Bed mobility training - pt given verbal and tactile cues to facilitate proper sequencing and safety during supine>sit as well as provided with physical assistance to complete task   Sitting EOB for >5 minutes for upright tolerance, postural awareness and BLE ROM  LE activities, ankle pumps and LAQs for warm up 1 set 5 reps each  Transfer training - pt was given verbal and tactile cues to facilitate proper hand placement, technique and safety during sit to stand and stand to sit as well as provided with physical assistance. Use of rollator with education on proximity to rollator with stand pivot transferring and sit to stand transfers  STS x4 for adjustment of cushions and pillows in chair  Gait training- pt was given verbal and tactile cues to facilitate appropriate use of rollator during ambulation as well as provided with physical assistance for safety/stability. Pt's/ family goals   1. Return to PLOF    Prognosis is good for reaching above PT goals. Patient and or family understand(s) diagnosis, prognosis, and plan of care.   Yes    PHYSICAL THERAPY PLAN OF CARE:    PT POC is established based on physician order and patient diagnosis     Referring provider/PT Order:    PT eval and treat  Start:  08/08/22 1445,   End:  08/08/22 1445,   ONE TIME,   Standing Count:  1 Occurrences,   Tonja Castañeda MD     Diagnosis:  Bilateral pleural effusion [K52]  Acute systolic CHF (congestive heart failure) ([facility] [I50.21]  Urinary tract infection without hematuria, site unspecified [N39.0]  Acute on chronic heart failure, unspecified heart failure type ([facility] [I50.9]  Specific instructions for next treatment:  progress mobility    Time in  0920  Time out  0950  Time in 1023  Time out 1033    Total Treatment Time  45 minutes     CPT codes:  [] Low Complexity PT evaluation 98806  [] Moderate Complexity PT evaluation 35473  [] High Complexity PT evaluation 21721  [] PT Re-evaluation 48164  [] Gait training 17555 - minutes  [] Manual therapy 06026 - minutes  [x] Therapeutic activities 19273 45 minutes  [] Therapeutic exercises 82323 - minutes  [] Neuromuscular reeducation 37782 - minutes     Osbaldo Noun, PT, DPT [id number]

## 2022-08-11 NOTE — PROGRESS NOTES
CLINICAL PHARMACY NOTE: MEDS TO BEDS    Total # of Prescriptions Filled: 1   The following medications were delivered to the patient:  Second delivery  Bumetanide 1 mg  Delivered to pt @ 4:25p    Additional Documentation:

## 2022-08-11 NOTE — PROGRESS NOTES
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 He denies any chest discomfort nor lightheadedness. REVIEW OF SYSTEMS:  General ROS: negative for - fatigue, malaise,  weight gain or weight loss  Psychological ROS: negative for - anxiety , depression  Ophthalmic ROS: negative for - decreased vision or visual distortion. ENT ROS: negative  Allergy and Immunology ROS: negative  Hematological and Lymphatic ROS: negative  Endocrine: no heat or cold intolerance and no polyphagia, polydipsia, or polyuria  Respiratory ROS: positive for - shortness of breath-improved  Cardiovascular ROS: positive for - dyspnea on exertion, irregular heartbeat, murmur, and shortness of breath. Gastrointestinal ROS: no abdominal pain, change in bowel habits, or black or bloody stools  Genito-Urinary ROS: no nocturia, dysuria, trouble voiding, frequency or hematuria  Musculoskeletal ROS: negative for- myalgias, arthralgias, or claudication  Neurological ROS: no TIA or stroke symptoms otherwise no significant change in symptoms or problems since yesterday as documented in previous progress notes.     SCHEDULED MEDICATIONS:   ocuvite-lutein  1 capsule Oral Daily    tamsulosin  0.4 mg Oral Daily    sodium chloride flush  5-40 mL IntraVENous 2 times per day    enoxaparin  40 mg SubCUTAneous Daily    furosemide  20 mg IntraVENous BID    cefTRIAXone (ROCEPHIN) IV  1,000 mg IntraVENous Q24H    pantoprazole  40 mg Oral QAM AC       VITAL SIGNS:                                                                                                                          /72   Pulse 77   Temp 97.2 °F (36.2 °C) (Temporal)   Resp 17   Ht 5' 9\" (1.753 m)   Wt 144 lb 12.8 oz (65.7 kg)   SpO2 94%   BMI 21.38 kg/m²   Patient Vitals for the past 96 hrs (Last 3 readings):   Weight   08/11/22 0630 144 lb 12.8 oz (65.7 kg)   08/10/22 0341 144 lb (65.3 kg)   08/09/22 0420 150 lb (68 kg)     OBJECTIVE:    HEENT: PERRL, EOM  Intact; sclera non-icteric, conjunctiva pink. Carotids are brisk in upstroke with decreased and delayed upstroke contour. No carotid bruits. Normal jugular venous pulsation at 45°. No palpable cervical nor supraclavicular nodes. Thyroid not palpable. Trachea midline. Chest: Even excursion  Lungs: Mostly clear to auscultation bilaterally. No expiratory wheezes or rhonchi, no decreased tactile fremitus without inspiratory rales. Heart: Regular, irregular few ectopics noted. Rhythm; S1 > S2, no gallop; grade 3-6/2 systolic murmur heard in the second right intercostal space as well as the apex. No clicks, rub, palpable thrills   or heaves. PMI nondisplaced, 5th intercostal space MCL. Abdomen: Soft, nontender, nondistended,  mildly protuberant, no masses or organomegaly. Bowel sounds active. Extremities: Without clubbing, cyanosis or edema. Pulses present 3+ upper extermities bilaterally; present 1+ DP and present 1+ PT bilaterally.      Data:   Scheduled Meds: Reviewed  Continuous Infusions:    sodium chloride         Intake/Output Summary (Last 24 hours) at 8/11/2022 1425  Last data filed at 8/11/2022 0630  Gross per 24 hour   Intake --   Output 1950 ml   Net -1950 ml     CBC: No results for input(s): WBC, HGB, HCT, PLT in the last 72 remains excellent. He is now quite symptomatic with shortness of breath with minimal exertion and fortunately his left ventricular systolic function remains normal despite his severe calcific aortic valve disease. I have spent more than 25 minutes face to face with Segundo Avendaño and reviewing notes and laboratory data, with greater than 50% of this time instructing and counseling the patient face to face regarding my findings and recommendations and I have answered all questions as posed to me by  Maximino. Chadd Nascimento, DO FACP,FACC,[facility]      NOTE:  This report was transcribed using voice recognition software.   Every effort was made to ensure accuracy; however, inadvertent computerized transcription errors may be present

## 2022-08-11 NOTE — PROGRESS NOTES
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) I50.31       SUBJECTIVE:  Ramu Campos states he feels better but feels that his pain and oxygen as he becomes short of breath with exertion despite his present medical regimen. He denies any lightheadedness nor chest discomfort presently. REVIEW OF SYSTEMS:  General ROS: negative for - fatigue, malaise,  weight gain or weight loss  Psychological ROS: negative for - anxiety , depression  Ophthalmic ROS: negative for - decreased vision or visual distortion. ENT ROS: negative  Allergy and Immunology ROS: negative  Hematological and Lymphatic ROS: negative  Endocrine: no heat or cold intolerance and no polyphagia, polydipsia, or polyuria  Respiratory ROS: positive for - shortness of breath-improved  Cardiovascular ROS: positive for - dyspnea on exertion, irregular heartbeat, murmur, and shortness of breath. Gastrointestinal ROS: no abdominal pain, change in bowel habits, or black or bloody stools  Genito-Urinary ROS: no nocturia, dysuria, trouble voiding, frequency or hematuria  Musculoskeletal ROS: negative for- myalgias, arthralgias, or claudication  Neurological ROS: no TIA or stroke symptoms otherwise no significant change in symptoms or problems since yesterday as documented in previous progress notes.     SCHEDULED MEDICATIONS:   [START ON 8/12/2022] bumetanide  1 mg Oral Daily    ocuvite-lutein  1 capsule Oral Daily    tamsulosin  0.4 mg Oral Daily    sodium chloride flush  5-40 mL IntraVENous 2 times per day    enoxaparin  40 mg SubCUTAneous Daily    cefTRIAXone (ROCEPHIN) IV  1,000 mg IntraVENous Q24H    pantoprazole  40 mg Oral QAM AC       VITAL SIGNS:                                                                                                                          /72   Pulse 77   Temp 97.2 °F (36.2 °C) (Temporal)   Resp 17   Ht 5' 9\" (1.753 m)   Wt 144 lb 12.8 oz (65.7 kg)   SpO2 94%   BMI 21.38 kg/m²   Patient Vitals for the past 96 hrs (Last 3 readings):   Weight   08/11/22 0630 144 lb 12.8 oz (65.7 kg)   08/10/22 0341 144 lb (65.3 kg)   08/09/22 0420 150 lb (68 kg)     OBJECTIVE:    HEENT: PERRL, EOM  Intact; sclera non-icteric, conjunctiva pink. Carotids are brisk in upstroke with decreased and delayed upstroke contour. No carotid bruits. Normal jugular venous pulsation at 45°. No palpable cervical nor supraclavicular nodes. Thyroid not palpable. Trachea midline. Chest: Even excursion  Lungs: Mostly clear to auscultation bilaterally. No expiratory wheezes or rhonchi, no decreased tactile fremitus without inspiratory rales. Heart: Regular, irregular few ectopics noted. Rhythm; S1 > S2, no gallop; grade 0-5/3 systolic murmur heard in the second right intercostal space as well as the apex. No clicks, rub, palpable thrills   or heaves. PMI nondisplaced, 5th intercostal space MCL. Abdomen: Soft, nontender, nondistended,  mildly protuberant, no masses or organomegaly. Bowel sounds active. Extremities: Without clubbing, cyanosis or edema. Pulses present 3+ upper extermities bilaterally; present 1+ DP and present 1+ PT bilaterally.      Data:   Scheduled Meds: Reviewed  Continuous Infusions:    sodium chloride         Intake/Output Summary (Last 24 hours) at 8/11/2022 1509  Last data filed at 8/11/2022 0630  Gross per 24 hour   Intake --   Output 1950 ml   Net -1950 ml     CBC: No results for input(s): WBC, HGB, HCT, PLT in the last 72 hours.  BMP:  Recent Labs     08/09/22  0534 08/10/22  0533    142   K 3.4* 4.2   * 106   CO2 22 25   BUN 29* 30*   CREATININE 1.1 1.1   LABGLOM >60 >60     ABGs: No results found for: PH, PO2, PCO2  INR: No results for input(s): INR in the last 72 hours. PRO-BNP:   Lab Results   Component Value Date    PROBNP 1,232 (H) 08/07/2022    PROBNP 1,097 (H) 02/01/2021      TSH:   Lab Results   Component Value Date    TSH 2.330 11/09/2021      Cardiac Injury Profile: No results for input(s): TROPHS in the last 72 hours. Lipid Profile:   Lab Results   Component Value Date/Time    TRIG 74 11/09/2021 02:00 PM    HDL 58 11/09/2021 02:00 PM    LDLCALC 97 11/09/2021 02:00 PM    CHOL 170 11/09/2021 02:00 PM      Hemoglobin A1C: No components found for: HGBA1C      RAD:   US DUP LOWER EXTREMITIES BILATERAL VENOUS   Final Result   Within the visualized vessels there is no evidence for deep venous   thrombosis               XR CHEST PORTABLE   Final Result   Cardiomegaly with pulmonary edema and bilateral pleural effusions consistent   with congestive heart failure acute exacerbation. 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 Aure Ingram be discharged on nasal cannula oxygen until he is evaluated for potential percutaneous valve replacement. Likewise he is to take bumetanide 1 mg daily and carefully monitor his renal function and blood pressure as an outpatient.   Will see in follow-up and I recommended that he see his primary physician, Dr. Rolando Mchugh within the next 7-10 days following discharge. I have spent more than 25 minutes face to face with Rachelle Santizo and reviewing notes and laboratory data, with greater than 50% of this time instructing and counseling the patient face to face regarding my findings and recommendations and I have answered all questions as posed to me by Mr. Stanton. Alpha Papa, DO FACP,FACC,FSCAI      NOTE:  This report was transcribed using voice recognition software.   Every effort was made to ensure accuracy; however, inadvertent computerized transcription errors may be present

## 2022-09-15 NOTE — DISCHARGE SUMMARY
Physician Discharge Summary     Patient ID:  Hina Ren  [id number]  89 y.o.  5/12/1919    Admit date: 8/7/2022    Discharge date and time: 8/11/2022  5:50 PM     Admission Diagnoses: Principal Problem:    Acute diastolic CHF (congestive heart failure) (HCC)  Active Problems:    Severe aortic stenosis  Resolved Problems:    * No resolved hospital problems. *      Discharge Diagnoses: Principal Problem:    Acute diastolic CHF (congestive heart failure) (HCC)  Active Problems:    Severe aortic stenosis  Resolved Problems:    * No resolved hospital problems. *      Condition at discharge :       Consults: IP CONSULT TO INTERNAL MEDICINE  IP CONSULT TO HEART FAILURE NURSE/COORDINATOR  IP CONSULT TO CARDIOLOGY  TEST MOCK CON71    Procedures: None    Hospital Course: Patient is 8-year-old gentleman presents to the emergency room with leg increasing leg edema and shortness of breath. He was noted to have acute CHF and UTI. He was admitted for further evaluation and treatment. He was started on diuretics. Also empiric antibiotics. He was also evaluated by cardiology. Echo was done. This revealed severe aortic stenosis. Conservative medical management was recommended. He was then discharged home. US DUP LOWER EXTREMITIES BILATERAL VENOUS   Final Result   Within the visualized vessels there is no evidence for deep venous   thrombosis               XR CHEST PORTABLE   Final Result   Cardiomegaly with pulmonary edema and bilateral pleural effusions consistent   with congestive heart failure acute exacerbation. No results found for this or any previous visit (from the past 336 hour(s)).       Discharge Exam:  See progress note from today    Disposition: Home    Patient Instructions:   Discharge Medication List as of 8/11/2022  4:34 PM        START taking these medications    Details   bumetanide (BUMEX) 1 MG tablet Take 1 tablet by mouth in the morning., Disp-30 tablet, R-3Normal           CONTINUE these medications which have NOT CHANGED    Details   aspirin 81 MG chewable tablet Take 81 mg by mouth in the morning. Historical Med      Probiotic Product (CULTURELLE PROBIOTICS PO) Take by mouthHistorical Med      tamsulosin (FLOMAX) 0.4 MG capsule TAKE ONE CAPSULE BY MOUTH AT BEDTIMEHistorical Med      omeprazole (PRILOSEC) 40 MG delayed release capsule TAKE ONE CAPSULE BY MOUTH EVERY DAYHistorical Med      vitamin E 1000 units capsule Take 1,000 Units by mouth dailyHistorical Med      Multiple Vitamins-Minerals (MULTIVITAMIN PO) Take 1 tablet by mouth daily Historical Med      Multiple Vitamins-Minerals (PRESERVISION AREDS) CAPS Take 1 capsule by mouth dailyHistorical Med      Vitamin D (CHOLECALCIFEROL) 1000 UNITS CAPS capsule Take 1,000 Units by mouth 2 times dailyHistorical Med           STOP taking these medications       ondansetron (ZOFRAN) 4 MG tablet Comments:   Reason for Stopping:         bevacizumab (AVASTIN) 2.5 mg/0.1 ml syringe Comments:   Reason for Stopping:         carboxymethylcellulose 1 % ophthalmic solution Comments:   Reason for Stopping:               Activity: As tolerated    Diet: Cardiac    Follow-up with Landon Meyer MD  [address] [place] [address] [phone]    Go on 8/18/2022  Appointment scheduled on 8/18/22 @ 1:30 Pm, if you need to reschedule please call [address] [place] [address]  [facility] [address] [place] [address]  [phone]    Call today  to schedule hospital follow up appointment, for heart failure management        Note that over 30 minutes was spent in preparing discharge papers, discussing discharge with patient, medication review, etc.    Signed:  Sammy Osorio MD  9/15/2022  10:09 AM

## 2022-11-07 ENCOUNTER — HOSPITAL ENCOUNTER (OUTPATIENT)
Age: 87
Discharge: HOME | End: 2022-11-07
Payer: MEDICARE

## 2022-11-07 LAB
ANION GAP SERPL CALCULATED.3IONS-SCNC: 8 MMOL/L (ref 7–16)
BASOPHILS ABSOLUTE: 0.04 E9/L (ref 0–0.2)
BASOPHILS RELATIVE PERCENT: 0.6 % (ref 0–2)
BUN BLDV-MCNC: 18 MG/DL (ref 6–23)
CALCIUM SERPL-MCNC: 9.1 MG/DL (ref 8.6–10.2)
CHLORIDE BLD-SCNC: 106 MMOL/L (ref 98–107)
CO2: 25 MMOL/L (ref 22–29)
CREAT SERPL-MCNC: 1 MG/DL (ref 0.7–1.2)
EOSINOPHILS ABSOLUTE: 0.34 E9/L (ref 0.05–0.5)
EOSINOPHILS RELATIVE PERCENT: 5.5 % (ref 0–6)
FERRITIN: 70 NG/ML
GFR SERPL CREATININE-BSD FRML MDRD: >60 ML/MIN/1.73
GLUCOSE BLD-MCNC: 98 MG/DL (ref 74–99)
HCT VFR BLD CALC: 35.3 % (ref 37–54)
HEMOGLOBIN: 11.6 G/DL (ref 12.5–16.5)
IMMATURE GRANULOCYTES #: 0.03 E9/L
IMMATURE GRANULOCYTES %: 0.5 % (ref 0–5)
INR BLD: 1.2
LYMPHOCYTES ABSOLUTE: 1.93 E9/L (ref 1.5–4)
LYMPHOCYTES RELATIVE PERCENT: 31 % (ref 20–42)
MCH RBC QN AUTO: 31.6 PG (ref 26–35)
MCHC RBC AUTO-ENTMCNC: 32.9 % (ref 32–34.5)
MCV RBC AUTO: 96.2 FL (ref 80–99.9)
MONOCYTES ABSOLUTE: 0.61 E9/L (ref 0.1–0.95)
MONOCYTES RELATIVE PERCENT: 9.8 % (ref 2–12)
NEUTROPHILS ABSOLUTE: 3.28 E9/L (ref 1.8–7.3)
NEUTROPHILS RELATIVE PERCENT: 52.6 % (ref 43–80)
PDW BLD-RTO: 15.6 FL (ref 11.5–15)
PLATELET # BLD: 239 E9/L (ref 130–450)
PMV BLD AUTO: 9.2 FL (ref 7–12)
POTASSIUM SERPL-SCNC: 4.7 MMOL/L (ref 3.5–5)
PROTHROMBIN TIME: 12.7 SEC (ref 9.3–12.4)
RBC # BLD: 3.67 E12/L (ref 3.8–5.8)
SODIUM BLD-SCNC: 139 MMOL/L (ref 132–146)
VITAMIN D 25-HYDROXY: 60 NG/ML (ref 30–100)
WBC # BLD: 6.2 E9/L (ref 4.5–11.5)

## 2022-11-07 PROCEDURE — 80048 BASIC METABOLIC PNL TOTAL CA: CPT

## 2022-11-07 PROCEDURE — 36415 COLL VENOUS BLD VENIPUNCTURE: CPT

## 2022-11-07 PROCEDURE — 85025 COMPLETE CBC W/AUTO DIFF WBC: CPT

## 2022-11-07 PROCEDURE — 85610 PROTHROMBIN TIME: CPT

## 2022-11-07 PROCEDURE — 82306 VITAMIN D 25 HYDROXY: CPT

## 2022-11-07 PROCEDURE — 82728 ASSAY OF FERRITIN: CPT

## 2022-12-09 ENCOUNTER — HOSPITAL ENCOUNTER (OUTPATIENT)
Age: 87
Discharge: HOME | End: 2022-12-09
Payer: MEDICARE

## 2022-12-09 LAB
ALBUMIN SERPL-MCNC: 3.1 G/DL (ref 3.5–5.2)
ALP BLD-CCNC: 76 U/L (ref 40–129)
ALT SERPL-CCNC: 12 U/L (ref 0–40)
ANION GAP SERPL CALCULATED.3IONS-SCNC: 22 MMOL/L (ref 7–16)
AST SERPL-CCNC: 20 U/L (ref 0–39)
BASOPHILS ABSOLUTE: 0.03 E9/L (ref 0–0.2)
BASOPHILS RELATIVE PERCENT: 0.3 % (ref 0–2)
BILIRUB SERPL-MCNC: 1.3 MG/DL (ref 0–1.2)
BILIRUBIN DIRECT: 0.5 MG/DL (ref 0–0.3)
BILIRUBIN, INDIRECT: 0.8 MG/DL (ref 0–1)
BUN BLDV-MCNC: 42 MG/DL (ref 6–23)
CALCIUM SERPL-MCNC: 9.8 MG/DL (ref 8.6–10.2)
CHLORIDE BLD-SCNC: 109 MMOL/L (ref 98–107)
CHOLESTEROL, TOTAL: 133 MG/DL (ref 0–199)
CO2: 20 MMOL/L (ref 22–29)
CREAT SERPL-MCNC: 1.2 MG/DL (ref 0.7–1.2)
EOSINOPHILS ABSOLUTE: 0.02 E9/L (ref 0.05–0.5)
EOSINOPHILS RELATIVE PERCENT: 0.2 % (ref 0–6)
GFR SERPL CREATININE-BSD FRML MDRD: 53 ML/MIN/1.73
GLUCOSE BLD-MCNC: 115 MG/DL (ref 74–99)
HCT VFR BLD CALC: 39.7 % (ref 37–54)
HDLC SERPL-MCNC: 43 MG/DL
HEMOGLOBIN: 12.5 G/DL (ref 12.5–16.5)
IMMATURE GRANULOCYTES #: 0.08 E9/L
IMMATURE GRANULOCYTES %: 0.7 % (ref 0–5)
IRON: 35 MCG/DL (ref 59–158)
LDL CHOLESTEROL CALCULATED: 76 MG/DL (ref 0–99)
LYMPHOCYTES ABSOLUTE: 1.41 E9/L (ref 1.5–4)
LYMPHOCYTES RELATIVE PERCENT: 12.7 % (ref 20–42)
MCH RBC QN AUTO: 30.3 PG (ref 26–35)
MCHC RBC AUTO-ENTMCNC: 31.5 % (ref 32–34.5)
MCV RBC AUTO: 96.1 FL (ref 80–99.9)
MONOCYTES ABSOLUTE: 1.25 E9/L (ref 0.1–0.95)
MONOCYTES RELATIVE PERCENT: 11.3 % (ref 2–12)
NEUTROPHILS ABSOLUTE: 8.31 E9/L (ref 1.8–7.3)
NEUTROPHILS RELATIVE PERCENT: 74.8 % (ref 43–80)
PDW BLD-RTO: 15.7 FL (ref 11.5–15)
PLATELET # BLD: 256 E9/L (ref 130–450)
PMV BLD AUTO: 10.2 FL (ref 7–12)
POTASSIUM SERPL-SCNC: 2.9 MMOL/L (ref 3.5–5)
PROSTATE SPECIFIC ANTIGEN: 11.36 NG/ML (ref 0–4)
RBC # BLD: 4.13 E12/L (ref 3.8–5.8)
SEDIMENTATION RATE, ERYTHROCYTE: 53 MM/HR (ref 0–15)
SODIUM BLD-SCNC: 151 MMOL/L (ref 132–146)
T4 FREE: 1.3 NG/DL (ref 0.93–1.7)
TOTAL PROTEIN: 6.9 G/DL (ref 6.4–8.3)
TRIGL SERPL-MCNC: 70 MG/DL (ref 0–149)
TSH SERPL DL<=0.05 MIU/L-ACNC: 2.46 UIU/ML (ref 0.27–4.2)
URIC ACID, SERUM: 10.3 MG/DL (ref 3.4–7)
VITAMIN D 25-HYDROXY: 60 NG/ML (ref 30–100)
VLDLC SERPL CALC-MCNC: 14 MG/DL
WBC # BLD: 11.1 E9/L (ref 4.5–11.5)

## 2022-12-09 PROCEDURE — 85025 COMPLETE CBC W/AUTO DIFF WBC: CPT

## 2022-12-09 PROCEDURE — 80053 COMPREHEN METABOLIC PANEL: CPT

## 2022-12-09 PROCEDURE — 85651 RBC SED RATE NONAUTOMATED: CPT

## 2022-12-09 PROCEDURE — 82306 VITAMIN D 25 HYDROXY: CPT

## 2022-12-09 PROCEDURE — 84550 ASSAY OF BLOOD/URIC ACID: CPT

## 2022-12-09 PROCEDURE — 82248 BILIRUBIN DIRECT: CPT

## 2022-12-09 PROCEDURE — 84439 ASSAY OF FREE THYROXINE: CPT

## 2022-12-09 PROCEDURE — 83540 ASSAY OF IRON: CPT

## 2022-12-09 PROCEDURE — 80061 LIPID PANEL: CPT

## 2022-12-09 PROCEDURE — 84443 ASSAY THYROID STIM HORMONE: CPT

## 2022-12-09 PROCEDURE — G0103 PSA SCREENING: HCPCS

## 2022-12-09 PROCEDURE — 36415 COLL VENOUS BLD VENIPUNCTURE: CPT

## 2023-01-08 ENCOUNTER — APPOINTMENT (OUTPATIENT)
Dept: GENERAL RADIOLOGY | Age: 88
DRG: 292 | End: 2023-01-08
Payer: MEDICARE

## 2023-01-08 ENCOUNTER — HOSPITAL ENCOUNTER (INPATIENT)
Age: 88
LOS: 8 days | Discharge: NURSING, REHAB OR LONG-TERM CARE | DRG: 292 | End: 2023-01-16
Attending: EMERGENCY MEDICINE | Admitting: INTERNAL MEDICINE
Payer: MEDICARE

## 2023-01-08 DIAGNOSIS — E87.6 HYPOKALEMIA: ICD-10-CM

## 2023-01-08 DIAGNOSIS — I50.33 ACUTE ON CHRONIC DIASTOLIC CONGESTIVE HEART FAILURE (HCC): Primary | ICD-10-CM

## 2023-01-08 LAB
ALBUMIN SERPL-MCNC: 3 G/DL (ref 3.5–5.2)
ALP BLD-CCNC: 76 U/L (ref 40–129)
ALT SERPL-CCNC: 12 U/L (ref 0–40)
ANION GAP SERPL CALCULATED.3IONS-SCNC: 12 MMOL/L (ref 7–16)
AST SERPL-CCNC: 22 U/L (ref 0–39)
BASOPHILS ABSOLUTE: 0.05 E9/L (ref 0–0.2)
BASOPHILS RELATIVE PERCENT: 0.7 % (ref 0–2)
BILIRUB SERPL-MCNC: 0.7 MG/DL (ref 0–1.2)
BUN BLDV-MCNC: 20 MG/DL (ref 6–23)
CALCIUM SERPL-MCNC: 8.7 MG/DL (ref 8.6–10.2)
CHLORIDE BLD-SCNC: 106 MMOL/L (ref 98–107)
CO2: 27 MMOL/L (ref 22–29)
CREAT SERPL-MCNC: 1.1 MG/DL (ref 0.7–1.2)
EOSINOPHILS ABSOLUTE: 0.06 E9/L (ref 0.05–0.5)
EOSINOPHILS RELATIVE PERCENT: 0.9 % (ref 0–6)
GFR SERPL CREATININE-BSD FRML MDRD: 59 ML/MIN/1.73
GLUCOSE BLD-MCNC: 127 MG/DL (ref 74–99)
HCT VFR BLD CALC: 39.3 % (ref 37–54)
HEMOGLOBIN: 12.6 G/DL (ref 12.5–16.5)
IMMATURE GRANULOCYTES #: 0.05 E9/L
IMMATURE GRANULOCYTES %: 0.7 % (ref 0–5)
INFLUENZA A BY PCR: NOT DETECTED
INFLUENZA B BY PCR: NOT DETECTED
INR BLD: 1.3
LYMPHOCYTES ABSOLUTE: 1.19 E9/L (ref 1.5–4)
LYMPHOCYTES RELATIVE PERCENT: 17.2 % (ref 20–42)
MCH RBC QN AUTO: 29.6 PG (ref 26–35)
MCHC RBC AUTO-ENTMCNC: 32.1 % (ref 32–34.5)
MCV RBC AUTO: 92.5 FL (ref 80–99.9)
MONOCYTES ABSOLUTE: 0.68 E9/L (ref 0.1–0.95)
MONOCYTES RELATIVE PERCENT: 9.8 % (ref 2–12)
NEUTROPHILS ABSOLUTE: 4.88 E9/L (ref 1.8–7.3)
NEUTROPHILS RELATIVE PERCENT: 70.7 % (ref 43–80)
PDW BLD-RTO: 16.5 FL (ref 11.5–15)
PLATELET # BLD: 214 E9/L (ref 130–450)
PMV BLD AUTO: 9.7 FL (ref 7–12)
POTASSIUM SERPL-SCNC: 3.4 MMOL/L (ref 3.5–5)
PRO-BNP: 2973 PG/ML (ref 0–450)
PROTHROMBIN TIME: 13.6 SEC (ref 9.3–12.4)
RBC # BLD: 4.25 E12/L (ref 3.8–5.8)
SARS-COV-2, NAAT: NOT DETECTED
SODIUM BLD-SCNC: 145 MMOL/L (ref 132–146)
TOTAL PROTEIN: 6.1 G/DL (ref 6.4–8.3)
TROPONIN, HIGH SENSITIVITY: 51 NG/L (ref 0–11)
TROPONIN, HIGH SENSITIVITY: 52 NG/L (ref 0–11)
WBC # BLD: 6.9 E9/L (ref 4.5–11.5)

## 2023-01-08 PROCEDURE — 96374 THER/PROPH/DIAG INJ IV PUSH: CPT

## 2023-01-08 PROCEDURE — 87635 SARS-COV-2 COVID-19 AMP PRB: CPT

## 2023-01-08 PROCEDURE — 84484 ASSAY OF TROPONIN QUANT: CPT

## 2023-01-08 PROCEDURE — 6370000000 HC RX 637 (ALT 250 FOR IP): Performed by: INTERNAL MEDICINE

## 2023-01-08 PROCEDURE — 99285 EMERGENCY DEPT VISIT HI MDM: CPT

## 2023-01-08 PROCEDURE — 83880 ASSAY OF NATRIURETIC PEPTIDE: CPT

## 2023-01-08 PROCEDURE — 85025 COMPLETE CBC W/AUTO DIFF WBC: CPT

## 2023-01-08 PROCEDURE — 80053 COMPREHEN METABOLIC PANEL: CPT

## 2023-01-08 PROCEDURE — 85610 PROTHROMBIN TIME: CPT

## 2023-01-08 PROCEDURE — 6360000002 HC RX W HCPCS: Performed by: EMERGENCY MEDICINE

## 2023-01-08 PROCEDURE — 87502 INFLUENZA DNA AMP PROBE: CPT

## 2023-01-08 PROCEDURE — 2580000003 HC RX 258: Performed by: INTERNAL MEDICINE

## 2023-01-08 PROCEDURE — 2140000000 HC CCU INTERMEDIATE R&B

## 2023-01-08 PROCEDURE — 6370000000 HC RX 637 (ALT 250 FOR IP): Performed by: EMERGENCY MEDICINE

## 2023-01-08 PROCEDURE — 6360000002 HC RX W HCPCS: Performed by: INTERNAL MEDICINE

## 2023-01-08 PROCEDURE — 71045 X-RAY EXAM CHEST 1 VIEW: CPT | Performed by: RADIOLOGY

## 2023-01-08 PROCEDURE — 71045 X-RAY EXAM CHEST 1 VIEW: CPT

## 2023-01-08 PROCEDURE — 93005 ELECTROCARDIOGRAM TRACING: CPT | Performed by: EMERGENCY MEDICINE

## 2023-01-08 RX ORDER — PANTOPRAZOLE SODIUM 40 MG/1
40 TABLET, DELAYED RELEASE ORAL
Status: DISCONTINUED | OUTPATIENT
Start: 2023-01-09 | End: 2023-01-16 | Stop reason: HOSPADM

## 2023-01-08 RX ORDER — OMEPRAZOLE 40 MG/1
1 CAPSULE, DELAYED RELEASE ORAL DAILY
Status: DISCONTINUED | OUTPATIENT
Start: 2023-01-08 | End: 2023-01-08 | Stop reason: CLARIF

## 2023-01-08 RX ORDER — ACETAMINOPHEN 650 MG/1
650 SUPPOSITORY RECTAL EVERY 6 HOURS PRN
Status: DISCONTINUED | OUTPATIENT
Start: 2023-01-08 | End: 2023-01-16 | Stop reason: HOSPADM

## 2023-01-08 RX ORDER — ONDANSETRON 2 MG/ML
4 INJECTION INTRAMUSCULAR; INTRAVENOUS EVERY 6 HOURS PRN
Status: DISCONTINUED | OUTPATIENT
Start: 2023-01-08 | End: 2023-01-16 | Stop reason: HOSPADM

## 2023-01-08 RX ORDER — SODIUM CHLORIDE 0.9 % (FLUSH) 0.9 %
5-40 SYRINGE (ML) INJECTION EVERY 12 HOURS SCHEDULED
Status: DISCONTINUED | OUTPATIENT
Start: 2023-01-08 | End: 2023-01-16 | Stop reason: HOSPADM

## 2023-01-08 RX ORDER — SODIUM CHLORIDE 0.9 % (FLUSH) 0.9 %
5-40 SYRINGE (ML) INJECTION PRN
Status: DISCONTINUED | OUTPATIENT
Start: 2023-01-08 | End: 2023-01-16 | Stop reason: HOSPADM

## 2023-01-08 RX ORDER — FUROSEMIDE 10 MG/ML
40 INJECTION INTRAMUSCULAR; INTRAVENOUS ONCE
Status: COMPLETED | OUTPATIENT
Start: 2023-01-08 | End: 2023-01-08

## 2023-01-08 RX ORDER — VIT C/E/ZN/COPPR/LUTEIN/ZEAXAN 60 MG-6 MG
1 CAPSULE ORAL DAILY
Status: DISCONTINUED | OUTPATIENT
Start: 2023-01-08 | End: 2023-01-16 | Stop reason: HOSPADM

## 2023-01-08 RX ORDER — POLYETHYLENE GLYCOL 3350 17 G/17G
17 POWDER, FOR SOLUTION ORAL DAILY PRN
Status: DISCONTINUED | OUTPATIENT
Start: 2023-01-08 | End: 2023-01-16 | Stop reason: HOSPADM

## 2023-01-08 RX ORDER — TAMSULOSIN HYDROCHLORIDE 0.4 MG/1
0.4 CAPSULE ORAL DAILY
Status: DISCONTINUED | OUTPATIENT
Start: 2023-01-08 | End: 2023-01-16 | Stop reason: HOSPADM

## 2023-01-08 RX ORDER — ONDANSETRON 4 MG/1
4 TABLET, ORALLY DISINTEGRATING ORAL EVERY 8 HOURS PRN
Status: DISCONTINUED | OUTPATIENT
Start: 2023-01-08 | End: 2023-01-16 | Stop reason: HOSPADM

## 2023-01-08 RX ORDER — ACETAMINOPHEN 325 MG/1
650 TABLET ORAL EVERY 6 HOURS PRN
Status: DISCONTINUED | OUTPATIENT
Start: 2023-01-08 | End: 2023-01-16 | Stop reason: HOSPADM

## 2023-01-08 RX ORDER — FUROSEMIDE 10 MG/ML
20 INJECTION INTRAMUSCULAR; INTRAVENOUS 2 TIMES DAILY
Status: DISCONTINUED | OUTPATIENT
Start: 2023-01-09 | End: 2023-01-14

## 2023-01-08 RX ORDER — ASPIRIN 81 MG/1
81 TABLET, CHEWABLE ORAL DAILY
Status: DISCONTINUED | OUTPATIENT
Start: 2023-01-08 | End: 2023-01-16 | Stop reason: HOSPADM

## 2023-01-08 RX ORDER — POTASSIUM CHLORIDE 20 MEQ/1
40 TABLET, EXTENDED RELEASE ORAL ONCE
Status: COMPLETED | OUTPATIENT
Start: 2023-01-08 | End: 2023-01-08

## 2023-01-08 RX ORDER — ENOXAPARIN SODIUM 100 MG/ML
40 INJECTION SUBCUTANEOUS DAILY
Status: DISCONTINUED | OUTPATIENT
Start: 2023-01-08 | End: 2023-01-16 | Stop reason: HOSPADM

## 2023-01-08 RX ORDER — SODIUM CHLORIDE 9 MG/ML
INJECTION, SOLUTION INTRAVENOUS PRN
Status: DISCONTINUED | OUTPATIENT
Start: 2023-01-08 | End: 2023-01-16 | Stop reason: HOSPADM

## 2023-01-08 RX ADMIN — TAMSULOSIN HYDROCHLORIDE 0.4 MG: 0.4 CAPSULE ORAL at 21:51

## 2023-01-08 RX ADMIN — ENOXAPARIN SODIUM 40 MG: 100 INJECTION SUBCUTANEOUS at 21:51

## 2023-01-08 RX ADMIN — Medication 10 ML: at 21:54

## 2023-01-08 RX ADMIN — ASPIRIN 81 MG CHEWABLE TABLET 81 MG: 81 TABLET CHEWABLE at 21:52

## 2023-01-08 RX ADMIN — POTASSIUM CHLORIDE 40 MEQ: 1500 TABLET, EXTENDED RELEASE ORAL at 20:01

## 2023-01-08 RX ADMIN — FUROSEMIDE 40 MG: 10 INJECTION, SOLUTION INTRAMUSCULAR; INTRAVENOUS at 19:11

## 2023-01-08 RX ADMIN — Medication 1 CAPSULE: at 21:54

## 2023-01-08 ASSESSMENT — PAIN - FUNCTIONAL ASSESSMENT: PAIN_FUNCTIONAL_ASSESSMENT: NONE - DENIES PAIN

## 2023-01-08 NOTE — ED PROVIDER NOTES
HPI:  1/8/23, Time: 6:33 PM JONAS Siddiqui is a 80 y.o. male presenting to the ED for shortness of breath beginning today. Patient presents with her daughter who helps to provide history. Patient was admitted in August 7, 2022 for CHF, he had an echocardiogram at that time. Please see results below and ED course. Patient's daughter states that he has had sudden onset swelling to his bilateral lower extremities with associated shortness of breath. Patient is on 3 to 4 L of oxygen at baseline, and he has not had any increased oxygen requirements. Patient's daughter states that the patient underwent a TAVR at [facility] on 10/29/22 after that admission. His cardiologist is Dr. Sang Bell. Patient has had no fevers, chest pain, syncope, abdominal pain, nausea, vomiting, or diarrhea. He presents from home.      --------------------------------------------- PAST HISTORY ---------------------------------------------  Past Medical History:  has a past medical history of Anemia, Aortic stenosis, Arthritis, Cm's esophagus with esophagitis, Cancer (HCC), Cellulitis, Dizziness, Easy bruising, GERD (gastroesophageal reflux disease), H/O emphysema, Heart murmur, Heartburn, History of stress test, Lightheadedness, Macular degeneration, Passed out, Stroke [facility]), and Unspecified cerebral artery occlusion with cerebral infarction. Past Surgical History:  has a past surgical history that includes Hemorrhoid surgery; Tonsillectomy and adenoidectomy; Cataract removal; Upper gastrointestinal endoscopy; ERCP (N/A, 3/11/2020); ERCP (3/11/2020); joint replacement; ERCP (N/A, 12/11/2020); ERCP (N/A, 12/11/2020); ERCP (N/A, 2/3/2021); ERCP (N/A, 2/3/2021); ERCP (N/A, 2/3/2021); Endoscopy, colon, diagnostic; fracture surgery (2013); Cholecystectomy, laparoscopic (N/A, 2/25/2021); ERCP (N/A, 2/25/2021); and ERCP (N/A, 2/25/2021). Social History:  reports that he has never smoked.  He has never used smokeless tobacco. He reports that he does not drink alcohol and does not use drugs. Family History: family history includes Heart Attack in his brother; Heart Disease in his brother, father, and mother. The patients home medications have been reviewed. Allergies: Patient has no known allergies.     -------------------------------------------------- RESULTS -------------------------------------------------  All laboratory and radiology results have been personally reviewed by myself   LABS:  Results for orders placed or performed during the hospital encounter of 01/08/23   COVID-19, Rapid    Specimen: Nasopharyngeal Swab   Result Value Ref Range    SARS-CoV-2, NAAT Not Detected Not Detected   RAPID INFLUENZA A/B ANTIGENS    Specimen: Nasopharyngeal   Result Value Ref Range    Influenza A by PCR Not Detected Not Detected    Influenza B by PCR Not Detected Not Detected   Brain Natriuretic Peptide   Result Value Ref Range    Pro-BNP 2,973 (H) 0 - 450 pg/mL   CBC with Auto Differential   Result Value Ref Range    WBC 6.9 4.5 - 11.5 E9/L    RBC 4.25 3.80 - 5.80 E12/L    Hemoglobin 12.6 12.5 - 16.5 g/dL    Hematocrit 39.3 37.0 - 54.0 %    MCV 92.5 80.0 - 99.9 fL    MCH 29.6 26.0 - 35.0 pg    MCHC 32.1 32.0 - 34.5 %    RDW 16.5 (H) 11.5 - 15.0 fL    Platelets 845 355 - 176 E9/L    MPV 9.7 7.0 - 12.0 fL    Neutrophils % 70.7 43.0 - 80.0 %    Immature Granulocytes % 0.7 0.0 - 5.0 %    Lymphocytes % 17.2 (L) 20.0 - 42.0 %    Monocytes % 9.8 2.0 - 12.0 %    Eosinophils % 0.9 0.0 - 6.0 %    Basophils % 0.7 0.0 - 2.0 %    Neutrophils Absolute 4.88 1.80 - 7.30 E9/L    Immature Granulocytes # 0.05 E9/L    Lymphocytes Absolute 1.19 (L) 1.50 - 4.00 E9/L    Monocytes Absolute 0.68 0.10 - 0.95 E9/L    Eosinophils Absolute 0.06 0.05 - 0.50 E9/L    Basophils Absolute 0.05 0.00 - 0.20 E9/L   CMP   Result Value Ref Range    Sodium 145 132 - 146 mmol/L    Potassium 3.4 (L) 3.5 - 5.0 mmol/L    Chloride 106 98 - 107 mmol/L    CO2 27 22 - 29 mmol/L Anion Gap 12 7 - 16 mmol/L    Glucose 127 (H) 74 - 99 mg/dL    BUN 20 6 - 23 mg/dL    Creatinine 1.1 0.7 - 1.2 mg/dL    EstInna Filt Rate 59 >=60 mL/min/1.73    Calcium 8.7 8.6 - 10.2 mg/dL    Total Protein 6.1 (L) 6.4 - 8.3 g/dL    Albumin 3.0 (L) 3.5 - 5.2 g/dL    Total Bilirubin 0.7 0.0 - 1.2 mg/dL    Alkaline Phosphatase 76 40 - 129 U/L    ALT 12 0 - 40 U/L    AST 22 0 - 39 U/L   Troponin   Result Value Ref Range    Troponin, High Sensitivity 52 (H) 0 - 11 ng/L   Protime-INR   Result Value Ref Range    Protime 13.6 (H) 9.3 - 12.4 sec    INR 1.3    EKG 12 Lead   Result Value Ref Range    Ventricular Rate 71 BPM    Atrial Rate 71 BPM    QRS Duration 90 ms    Q-T Interval 396 ms    QTc Calculation (Bazett) 430 ms    R Axis 9 degrees    T Axis -127 degrees       RADIOLOGY:  Interpreted by Radiologist.  XR CHEST PORTABLE   Final Result   Moderate right pleural effusion and small left pleural effusion. Mild pneumonitis in right upper lobe. Suspect bilateral lower lobe atelectasis and/or pneumonitis.             ------------------------- NURSING NOTES AND VITALS REVIEWED ---------------------------   The nursing notes within the ED encounter and vital signs as below have been reviewed. BP (!) 140/73   Pulse 65   Temp 97.8 °F (36.6 °C)   Resp 16   SpO2 100%   Oxygen Saturation Interpretation: Abnormal - but at baseline      ---------------------------------------------------PHYSICAL EXAM--------------------------------------      Constitutional/General: Alert, appears ill, non toxic in NAD  Head: Normocephalic and atraumatic  Eyes: EOMI  Mouth: Oropharynx clear, handling secretions, no trismus  Neck: Supple, full ROM,   Pulmonary: Lungs diminished at bases. Not in respiratory distress  Cardiovascular:  Regular rate, irregular rhythm, no murmurs, gallops, or rubs. Abdomen: Soft, non tender, non distended,   Extremities: Moves all extremities x 4. Warm and well perfused.  Pitting edema to bilateral lower extremities, soft and easily compressible compartments  Skin: warm and dry without rash  Neurologic: GCS 15, no focal motor or sensory deficits   Psych: Normal Affect. Behavior normal.      ------------------------------ ED COURSE/MEDICAL DECISION MAKING----------------------  Medications   furosemide (LASIX) injection 40 mg (40 mg IntraVENous Given 1/8/23 1911)   potassium chloride (KLOR-CON M) extended release tablet 40 mEq (40 mEq Oral Given 1/8/23 2001)       Medical Decision Making/ED COURSE:    History From: patient, daughter, chart review     Patient is a 80 y.o. male presenting to the ED for acute onset shortness of breath and leg swelling, severe in severity. In the ED, patient was hemodynamically stable and afebrile. On exam, patient had diminished breath sounds to bilateral lung bases and pitting edema to his lower extremities. Patient was placed on the cardiac monitor. I interpreted findings. Rhythm - a. fib. Labs and CXR obtained to evaluate for CHF. Patient administered IV lasix. I reviewed and interpreted labs. CBC was within normal limits. Patient had mild hypokalemia of 3.4 and BMP, so oral potassium was ordered. BNP was elevated at 2973. Troponin was 52, repeat pending. He has no active chest pain or acute ischemic changes on EKG. I suspect troponin elevation is due to his fluid overload. Chest xray interpreted by me. Interpretation-bilateral pulmonary edema with large right pleural effusion, increased when compared to prior chest x-ray. Awaiting read from radiologist.     Patient requires admission for IV diuresis. CONSULTS: (Who and What was discussed)  None    Hospitalist-Dr. Mishra accepted the patient for admission under Dr. Kal Mccarthy.     Social Determinants of Health : None    Chronic Conditions affecting care:    has a past medical history of Anemia, Aortic stenosis, Arthritis, Cm's esophagus with esophagitis, Cancer ([facility] [address] Cellulitis, Dizziness, Easy bruising, GERD (gastroesophageal reflux disease), H/O emphysema, Heart murmur, Heartburn, History of stress test, Lightheadedness, Macular degeneration, Passed out, Stroke [facility]), and Unspecified cerebral artery occlusion with cerebral infarction. Records Reviewed( Source)   I reviewed recent admission. See date and findings in HPI and in ED course below. Patient remained hemodynamically stable throughout ED course. ED Course as of 01/08/23 2019   Kandace Hernández Jan 08, 2023 1826 I reviewed the patient's chart. ECHO 8/10/22:   Left ventricle grossly normal in size. Proximal septal thickening. Normal LV segmental wall motion. Hyperdynamic left ventricular systolic contractility. The LAESV Index is >34 ml/m2. Mildly dilated right ventricle. Right ventricular segmental wall motion is normal.   TAPSE is normal   Mild mitral regurgitation is present. The aortic valve appears severely sclerotic. Trace-mild aortic regurgitation is noted. Severe aortic stenosis is present. Physiologic and/or trace pulmonic regurgitation present. The tricuspid valve appears structurally normal.   Mild to moderate tricuspid regurgitation. RVSP is 36 mmHg. Technically fair quality study. Patient was admitted that time on 8/7/2022 and treated for CHF. He was seen by his cardiologist, Dr. Jennifer Osullivan EKG: This EKG is signed and interpreted by me, Nghia Gordillo MD.    Rate: 71  Rhythm: Atrial fibrillation  Interpretation: atrial fibrillation (chronic), nonspecific ST and T wave abnormalities do not appear significantly changed when compared to prior EKG, normal QTC  Comparison: stable as compared to patient's most recent EKG   [JA]   1959 Hospitalist was consulted. Dr. Lexie Hernandez accepted the patient for admission under Dr. Dinora Mclean     [JA]      ED Course User Index  [JA] Nghia Gordillo MD       New Prescriptions    No medications on file       Counseling:    The emergency provider has spoken with the patient and daughter discussed todays results, in addition to providing specific details for the plan of care and counseling regarding the diagnosis and prognosis. Questions are answered at this time and they are agreeable with the plan.      --------------------------------- IMPRESSION AND DISPOSITION ---------------------------------    IMPRESSION  1. Acute on chronic diastolic congestive heart failure ([facility] [address]    2. Hypokalemia        DISPOSITION  Disposition: Admit to telemetry  Patient condition is stable      NOTE: This report was transcribed using voice recognition software.  Every effort was made to ensure accuracy; however, inadvertent computerized transcription errors may be present    IMarcela MD, am the primary provider of this record        Marcela Johnston MD  01/08/23 2019

## 2023-01-08 NOTE — LETTER
[address] [place] [address] Dr Department Medicaid  CERTIFICATION OF NECESSITY  FOR NON-EMERGENCY TRANSPORTATION   BY GROUND AMBULANCE      Individual Information   1. Name: Isa Calloway 2. [address] [place] [address] Dr Medicaid Billing Number:    3. Address: [address] [place] [address]      Transportation Provider Information   4. Provider Name:    5. [address] [place] [address] Dr Medicaid Provider Number:  National Provider Identifier (NPI):      Certification  7. Criteria:  During transport, this individual requires:  [x] Medical treatment or continuous     supervision by an EMT. [x] The administration or regulation of oxygen by another person. [] Supervised protective restraint. 8. Period Beginning Date:    5. Length  [x] Not more than 1 day(s)  [] One Year     Additional Information Relevant to Certification   10. 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 Name of Practitioner: Dr. Shanti Hess MD   12. [address] [place] [address]  Medicaid Provider Number, If Applicable:  [address] Provider Identifier (NPI):      Signature Information   14. Date of Signature:    13. Name of Person Signin. Signature and Professional Designation:          M [id number]  Rev. 2015          [address] Encounter Date/Time: 2023    Hospital Account: [de-identified]    MRN: [id number]    Patient: Hortencia Louis Serial #: [id number]      ENCOUNTER          Patient Class: I Private Enc?   No Unit RM BD: [facility] [id number]   Hospital Service: MED   Encounter DX: Hypokalemia [E87.6]   ADM Provider: Della Truong MD   Procedure:     ATT Provider: Della Truong MD   REF Provider:        Admission DX: Hypokalemia, Acute on chronic diastolic congestive heart failure ([facility] [address] Acute on chronic diastolic (congestive) heart failure ([facility] [address] and DX codes: E87.6, I50.33, I50.33      PATIENT                 Name: Rosanna Brooke : 1919 (89 yrs)   Address: [address] [place] [address] Sex: Male   [facility] [address]         Marital Status:    Employer: RETIRED         Sabianist: Mandaeism   Primary Care Provider: Alison Altman MD         Primary Phone: [phone]   EMERGENCY CONTACT   Contact Name Legal Guardian? Relationship to Patient Home Phone Work Phone   1. Brianne Fields  2. Nola Celis No  No Child  Grandchild [phone]              GUARANTOR            Guarantor: Rosanna Brooke     : 1919   Address: [address] [place] [address] Sex: Male   [place]  [id number]     Relation to Patient: Self       Home Phone: [phone]   Guarantor ID: [id number]       Work Phone:     Guarantor Employer: RETIRED         Status: RETIRED      COVERAGE        PRIMARY INSURANCE   Payor: Deidre Sniff MEDICARE Plan: Mary Jo Akers*   Payor Address: [facility] [id number], [place] [address]       Group Number: [id number] Insurance Type: [id number] Name: Lucie Mae : 1919   Subscriber ID: [id number] Dionisio Horne. Rel. to Sub: Self   SECONDARY INSURANCE   Payor:   Plan:     Payor Address:  ,           Group Number:   Insurance Type:     Subscriber Name:   Subscriber :     Subscriber ID:   Pat.  Rel. to Sub:           CSN: [id number]

## 2023-01-09 PROBLEM — E44.0 MODERATE PROTEIN-CALORIE MALNUTRITION (HCC): Chronic | Status: ACTIVE | Noted: 2023-01-09

## 2023-01-09 LAB
ANION GAP SERPL CALCULATED.3IONS-SCNC: 9 MMOL/L (ref 7–16)
BUN BLDV-MCNC: 18 MG/DL (ref 6–23)
CALCIUM SERPL-MCNC: 8.2 MG/DL (ref 8.6–10.2)
CHLORIDE BLD-SCNC: 109 MMOL/L (ref 98–107)
CO2: 30 MMOL/L (ref 22–29)
CREAT SERPL-MCNC: 1 MG/DL (ref 0.7–1.2)
D DIMER: 933 NG/ML DDU
EKG ATRIAL RATE: 71 BPM
EKG Q-T INTERVAL: 396 MS
EKG QRS DURATION: 90 MS
EKG QTC CALCULATION (BAZETT): 430 MS
EKG R AXIS: 9 DEGREES
EKG T AXIS: -127 DEGREES
EKG VENTRICULAR RATE: 71 BPM
GFR SERPL CREATININE-BSD FRML MDRD: >60 ML/MIN/1.73
GLUCOSE BLD-MCNC: 93 MG/DL (ref 74–99)
POTASSIUM SERPL-SCNC: 3.4 MMOL/L (ref 3.5–5)
SODIUM BLD-SCNC: 148 MMOL/L (ref 132–146)

## 2023-01-09 PROCEDURE — 80048 BASIC METABOLIC PNL TOTAL CA: CPT

## 2023-01-09 PROCEDURE — 2700000000 HC OXYGEN THERAPY PER DAY

## 2023-01-09 PROCEDURE — 93010 ELECTROCARDIOGRAM REPORT: CPT | Performed by: INTERNAL MEDICINE

## 2023-01-09 PROCEDURE — 6360000002 HC RX W HCPCS: Performed by: INTERNAL MEDICINE

## 2023-01-09 PROCEDURE — 2580000003 HC RX 258: Performed by: INTERNAL MEDICINE

## 2023-01-09 PROCEDURE — 85378 FIBRIN DEGRADE SEMIQUANT: CPT

## 2023-01-09 PROCEDURE — 36415 COLL VENOUS BLD VENIPUNCTURE: CPT

## 2023-01-09 PROCEDURE — 6370000000 HC RX 637 (ALT 250 FOR IP): Performed by: INTERNAL MEDICINE

## 2023-01-09 PROCEDURE — 2140000000 HC CCU INTERMEDIATE R&B

## 2023-01-09 RX ADMIN — TAMSULOSIN HYDROCHLORIDE 0.4 MG: 0.4 CAPSULE ORAL at 20:30

## 2023-01-09 RX ADMIN — ENOXAPARIN SODIUM 40 MG: 100 INJECTION SUBCUTANEOUS at 09:15

## 2023-01-09 RX ADMIN — Medication 10 ML: at 20:30

## 2023-01-09 RX ADMIN — Medication 1 CAPSULE: at 09:16

## 2023-01-09 RX ADMIN — ASPIRIN 81 MG CHEWABLE TABLET 81 MG: 81 TABLET CHEWABLE at 20:30

## 2023-01-09 RX ADMIN — Medication 10 ML: at 09:16

## 2023-01-09 RX ADMIN — FUROSEMIDE 20 MG: 10 INJECTION, SOLUTION INTRAMUSCULAR; INTRAVENOUS at 18:35

## 2023-01-09 RX ADMIN — FUROSEMIDE 20 MG: 10 INJECTION, SOLUTION INTRAMUSCULAR; INTRAVENOUS at 09:16

## 2023-01-09 RX ADMIN — PANTOPRAZOLE SODIUM 40 MG: 40 TABLET, DELAYED RELEASE ORAL at 09:16

## 2023-01-09 ASSESSMENT — PAIN SCALES - GENERAL
PAINLEVEL_OUTOF10: 0
PAINLEVEL_OUTOF10: 0

## 2023-01-09 NOTE — PROGRESS NOTES
Comprehensive Nutrition Assessment    Type and Reason for Visit:  Initial, Consult    Nutrition Recommendations/Plan:   Recommend and start Ensure Plus supplement BID and Beto wound healing supplement BID to help meet increased nutritional needs from wound healing. Malnutrition Assessment:  Malnutrition Status: Moderate malnutrition (01/09/23 1131)    Context:  Chronic Illness     Findings of the 6 clinical characteristics of malnutrition:  Energy Intake:  75% or less estimated energy requirements for 1 month or longer  Weight Loss:  No significant weight loss     Body Fat Loss:   (moderate) Orbital, Triceps   Muscle Mass Loss:   (moderate) Temples (temporalis), Clavicles (pectoralis & deltoids)  Fluid Accumulation:  No significant fluid accumulation     Strength:  Not Performed    Nutrition Assessment:    Patient adm w/ SOB and leg swelling ; wounds noted ; hx of CHF/CVA/emphysema ; hx of Cm's esophagus with esophagitis ; 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 ; hx of multiple ERCP's ; pt also meets criteria for moderate malnutrition ; will start ONS    Nutrition Related Findings:    I&Os WNL, 3-4+ edema, active BS, poor skin turgor, muscle/fat wasting ; Wound Type: Multiple, Open Wounds, Wound Consult Pending (wounds x 2)       Current Nutrition Intake & Therapies:    Average Meal Intake: 26-50%     ADULT DIET; Regular; Low Sodium (2 gm)    Anthropometric Measures:  Height: 5' 8\" (172.7 cm)  Ideal Body Weight (IBW): 154 lbs (70 kg)    Admission Body Weight: 144 lb (65.3 kg) (1/8, actual)  Current Body Weight: 144 lb (65.3 kg) (1/8, actual ; will not use 152# on 1/9 d/t likely fluid accumulation), 93.5 % IBW.     Current BMI (kg/m2): 21.9  Usual Body Weight:  (UTO ; EMR shows past weights of 144# bedscale on 8/11/22 and 133# standing scale on 2/22/21)                       BMI Categories: Normal Weight (BMI 22.0 to 24.9) age over 72    Estimated Daily Nutrient Needs:  Energy Requirements Based On: Formula  Weight Used for Energy Requirements: Admission  Energy (kcal/day): 4472-8596 (REE 1224 x 1.1 SF)  Weight Used for Protein Requirements: Current  Protein (g/day): 80-90 (1.2-1.4g/kg CBW)  Method Used for Fluid Requirements: 1 ml/kcal  Fluid (ml/day): 3029-7668    Nutrition Diagnosis:   Moderate malnutrition, In context of chronic illness related to cognitive or neurological impairment (2/2 advanced age) as evidenced by poor intake prior to admission, moderate loss of subcutaneous fat, moderate muscle loss    Nutrition Interventions:   Food and/or Nutrient Delivery: Continue Current Diet, Start Oral Nutrition Supplement  Nutrition Education/Counseling: Education not indicated  Coordination of Nutrition Care: Continue to monitor while inpatient       Goals:  Previous Goal Met: Progressing toward Goal(s)  Goals: PO intake 50% or greater, by next RD assessment       Nutrition Monitoring and Evaluation:   Behavioral-Environmental Outcomes: None Identified  Food/Nutrient Intake Outcomes: Food and Nutrient Intake, Supplement Intake  Physical Signs/Symptoms Outcomes: Biochemical Data, Chewing or Swallowing, GI Status, Fluid Status or Edema, Hemodynamic Status, Meal Time Behavior, Nutrition Focused Physical Findings, Skin, Weight    Discharge Planning:     Too soon to determine     Katie Garcia RD, LD  Contact: [phone]

## 2023-01-09 NOTE — CONSULTS
CHF NURSE NAVIGATOR CONSULT NOTE:      Patient currently admitted with diagnosis of Diastolic heart failure. Patient was resting peacefully during the consultation with daughter Aleyda Rizo present at the bedside. She was engaged and asked appropriate questions throughout the education session. She is agreeable to continued self monitoring and outpatient follow up. She wants to continue with PCP follow up at this time, and will keep progressing as patient wishes with his advanced age. Scheduling with the CHF clinic No: declines at this time . PCP (Dr. Ermelinda Jensen 1/16/23 @ 9:30 AM). No future appointments. Barriers to Care:  Contributing risk factors for Heart Failure are identified as advanced aging. The patient is ordered:  Diet: ADULT DIET; Regular; Low Sodium (2 gm)  ADULT ORAL NUTRITION SUPPLEMENT; Lunch, Dinner; Standard High Calorie/High Protein Oral Supplement  ADULT ORAL NUTRITION SUPPLEMENT; Breakfast, Dinner; Wound Healing Oral Supplement   Sodium controlled diet Yes  Fluid restriction daily ordered (fluid restriction recommended if patient is hyponatremic and/or diuretic is initiated or increased) No  FR:   Daily Weights: Patient Vitals for the past 96 hrs (Last 3 readings):   Weight   01/09/23 0015 152 lb 12.5 oz (69.3 kg)   01/08/23 2128 144 lb 13.5 oz (65.7 kg)     I/O:   Intake/Output Summary (Last 24 hours) at 1/9/2023 1152  Last data filed at 1/9/2023 0645  Gross per 24 hour   Intake --   Output 400 ml   Net -400 ml              We reviewed the introduction to Heart Failure, the HF zones, signs and symptoms to report on day 1 of onset, medications, medication compliance, the importance of obtaining daily weights, following a low sodium diet, reading food labels for the sodium content, keeping physician appointments, and smoking cessation. We discussed writing / tracking daily weights on a calendar / log because a 5 pound gain in 1 week can sneak up if you are not tracking it.           I advised patient they can reduce the risk for Heart Failure exacerbations by modifying / controlling the risk factors. We discussed self-managed care which includes the following:  to take medications as prescribed, report any intolerable side effects of medications to the cardiologist / doctor, do not just stop taking the medication; follow a cardiac heart healthy / low sodium diet; weigh yourself daily, exercise regularly- per doctor recommendation and not to smoke or use an excess amount of alcohol. We discussed calling the cardiologist / doctor with a weight gain of 3 pounds in one day or a total of 5 pounds or more in one week. Also, if you should have a significant weight loss of 3# or more in one day to call the doctor, they may need to decrease or hold the diuretic dose. On days you feels nauseated and not eating / drinking, having emesis or diarrhea,  informed to call the cardiologist  / doctor, they may need to decrease or hold diuretic to avoid dehydration. I stressed the importance of informing their cardiologist the first day of onset of any of the signs and symptoms in the \"Yellow Zone\" of the HF Zones. Patient verbalizes understanding. Greater than 30 minutes was spent educating patient. The Heart Failure Booklet given to the patient with additional patient education addressing:  What is Heart Failure? Things You Can Do to Live Well with HF  How to Take Your Medications  How to Eat Less Salt  Wilson its Salt?   Exercising Well with Heart Failure  Signs and symptoms of HF to report  Weight Yourself Each Day  Home Self Management- activity, weight tracking, taking medications as prescribed, meals /diet planning (sodium and fluid restriction), how to read food labels, keeping physician follow ups, smoking cessation, follow the Heart Failure Zones  The Heart Failure zones  Every Dose Every Day      Instructed  to call 911 if you have any of the following symptoms:       Struggling to breathe unrelieved with rest,     Having chest pain     Have confusion or cant think clearly          Emmy Pelayo RN BSN, RN  Heart Failure [address] [facility],[address] [facility] (CHF) AHA GUIDELINES  (Must be completed for Primary Diagnosis CHF or History of CHF)    Discharge Plan:  I placed the Heart Failure Home Instructions in patient's discharge instructions. Per Heart Failure [facility], the patient should have a follow-up appointment made within 7 days of discharge.     New Diagnosis No    ECHO Results most recent:  Lab Results   Component Value Date    LVEF 68 08/10/2022                                        Social History     Tobacco Use   Smoking Status Never   Smokeless Tobacco Never   Tobacco Comments    quit smoking cigars a very long time ago        Immunization History   Administered Date(s) Administered    COVID-19, MODERNA BLUE border, Primary or Immunocompromised, (age 12y+), IM, 100 mcg/0.5mL 01/12/2021    COVID-19, PFIZER Bivalent BOOSTER, DO NOT Dilute, (age 12y+), IM, 30 mcg/0.3 mL 10/10/2022    COVID-19, PFIZER GRAY top, DO NOT Dilute, (age 15 y+), IM, 30 mcg/0.3 mL 06/04/2022    COVID-19, PFIZER PURPLE top, DILUTE for use, (age 15 y+), 30mcg/0.3mL 10/14/2021    Influenza Virus Vaccine 10/06/2015          Angiotensin-Converting-Enzyme (ACE) inhibitor ordered:  [] Yes  [x] No (specify contraindication):    [] Contraindicated  [] Hypotensive patient who was at immediate risk of cardiogenic shock  [] Hospitalized patient who experienced marked azotemia  [] Other Contraindications  [] Not Eligible  [] Not Tolerant  [] Patient Reason  [] System Reason  [] Other Reason    Angiotensin II receptor blockers (ARB) ordered:  [] Yes  [x] No (specify contraindication):    [] Contraindicated  [] Hypotensive patient who was at immediate risk of cardiogenic shock  [] Hospitalized patient who experienced marked azotemia  [] Other Contraindications    ARNI - Angiotensin Receptor Neprilysin Inhibitor ordered:  [] Yes  [] No (specify contraindication):    [] ACE inhibitor use within the prior 36 hours  [] Allergy  [] Hyperkalemia  [] Hypotension  [] Renal dysfunction defined as creatinine > 2.5 mg/dL in men or > 2.0 mg/dL in women  [] Other Contraindications  [] Not Eligible  [] Not Tolerant  [] Patient Reason  []System Reason  []Other Reason      Beta Blocker ordered:    [] Yes  [x] No (specify contraindication):    [] Contraindicated  [] Asthma  [] Fluid Overload  [] Low Blood Pressure  [] Patient recently treated with an intravenous positive inotropic agent  [] Other Contraindications  [] Not Eligible  [] Not Tolerant  [] Patient Reason  [] System Reason    SGLT2 Inhibitor ordered:  [] Yes  [x] No (specify contraindication):    [] Contraindicated  [] Patient currently on dialysis  [] Ketoacidosis  [] Known hypersensitivity to the medication  [] Type I diabetes (not approved for use in patients with Type I diabetes due to increased risk of ketoacidosis)  [] Other Contraindications  [] Not Eligible  [] Not Tolerant  [] Patient Reason  [] System Reason  [] Other Reason    Aldosterone Antagonist ordered:  [] Yes  [x] No (specify contraindication):    [] Contraindicated  [] Allergy due to aldosterone receptor antagonist  [] Hyperkalemia  [] Renal dysfunction defined as creatinine >2.5 mg/dL in men or >2.0 mg/dL in women.   [] Other contraindications  [] Not Eligible  [] Not Tolerant  [] Patient Reason  [] System Reason  [] Other Reason

## 2023-01-09 NOTE — PLAN OF CARE
Patient's chart updated to reflect:      . - HF care plan, HF education points and HF discharge instructions.  -Orders: 2 gram sodium diet, daily weights, I/O.  -PCP and cardiology follow up appointments to be scheduled within 7 days of hospital discharge. -CHF education session will be provided to the patient prior to hospital discharge.     Jeraldine Closs, RN RN, BSN  Heart Failure Navigator

## 2023-01-09 NOTE — H&P
[facility] Internal Medicine  History and Physical      CHIEF COMPLAINT: Shortness of breath    Reason for Admission:. Acute on chronic diastolic dysfunction    History Obtained From: Patient, daughter    PCP :  Heber Myrick MD    [address] [place] [address]. / formerly [facility] [place] [address]      HISTORY OF PRESENT ILLNESS:      The patient is a 80 y.o. male presents to the emergency room for shortness of breath. Patient has an underlying history of severe aortic stenosis and is status post TAVR surgery at [address] in October 2022. Patient has been 3 L of oxygen at baseline. Recently he was noted to have worsening bilateral lower extremity swelling. In the emergency room patient was evaluated. Patient was basically at baseline as far as oxygen requirements are concerned. Patient did have elevated BNP. He was noted to have leg edema. Patient was noted to have moderate right-sided pleural effusion. It was felt the patient was suffering from acute on chronic diastolic dysfunction. Patient was then admitted. At the time of questioning patient reports some improvement. Daughter is by the bedside. Past Medical History:        Diagnosis Date    Anemia     Aortic stenosis     Arthritis     Cm's esophagus with esophagitis     Scope every 2 years with Dr. Ray Modi.     Cancer (HCC)     melanoma x3    Cellulitis     Dizziness     Easy bruising     GERD (gastroesophageal reflux disease)     H/O emphysema     Heart murmur     Heartburn     History of stress test     Lightheadedness     Macular degeneration     Passed out     Stroke [facility])     Unspecified cerebral artery occlusion with cerebral infarction      Past Surgical History:        Procedure Laterality Date    CATARACT REMOVAL      CHOLECYSTECTOMY, LAPAROSCOPIC N/A 2/25/2021    LAPAROSCOPIC CHOLECYSTECTOMY performed by Luz Iniguez MD at [address] [facility], DIAGNOSTIC      ERCP N/A 3/11/2020    ERCP STONE REMOVAL performed by Wolf Fontana MD at [address] [place] [address]    ERCP  3/11/2020    ERCP SPHINCTER/PAPILLOTOMY performed by Wolf Fontana MD at [facility]    ERCP N/A 12/11/2020    ERCP STENT REMOVAL/EXCHANGE performed by Wolf Fontana MD at [address]    ERCP N/A 12/11/2020    ERCP STONE REMOVAL performed by Wolf Fontana MD at [address]    ERCP N/A 2/3/2021    ERCP STONE REMOVAL performed by Wolf Fontana MD at [address] [place] [address]    ERCP N/A 2/3/2021    ERCP STENT REMOVAL/EXCHANGE performed by Wolf Fontana MD at [address] [place] [address]    ERCP N/A 2/3/2021    ERCP DILATION BALLOON performed by Wolf Fontana MD at [address] [place] [address]    ERCP N/A 2/25/2021    ERCP STENT REMOVAL performed by Wolf Fontana MD at [address]    ERCP N/A 2/25/2021    ERCP STONE REMOVAL performed by Wolf Fontana MD at [place] [address]    Left hip     HEMORRHOID SURGERY      JOINT REPLACEMENT      left hip    TONSILLECTOMY AND ADENOIDECTOMY      UPPER GASTROINTESTINAL ENDOSCOPY           Medications Prior to Admission:    Medications Prior to Admission: bumetanide (BUMEX) 1 MG tablet, Take 1 tablet by mouth in the morning. aspirin 81 MG chewable tablet, Take 81 mg by mouth in the morning. Probiotic Product (CULTURELLE PROBIOTICS PO), Take by mouth  tamsulosin (FLOMAX) 0.4 MG capsule, TAKE ONE CAPSULE BY MOUTH AT BEDTIME  omeprazole (PRILOSEC) 40 MG delayed release capsule, TAKE ONE CAPSULE BY MOUTH EVERY DAY  vitamin E 1000 units capsule, Take 1,000 Units by mouth daily  Multiple Vitamins-Minerals (MULTIVITAMIN PO), Take 1 tablet by mouth daily   Multiple Vitamins-Minerals (PRESERVISION AREDS) CAPS, Take 1 capsule by mouth daily  Vitamin D (CHOLECALCIFEROL) 1000 UNITS CAPS capsule, Take 1,000 Units by mouth 2 times daily    Allergies:  Patient has no known allergies.     Social History:   Social History     Socioeconomic History    Marital status:      Spouse name: linn    Number of children: 3    Years of education: 6 Highest education level: Not on file   Occupational History    Not on file   Tobacco Use    Smoking status: Never    Smokeless tobacco: Never    Tobacco comments:     quit smoking cigars a very long time ago   Vaping Use    Vaping Use: Never used   Substance and Sexual Activity    Alcohol use: No    Drug use: No    Sexual activity: Not on file   Other Topics Concern    Not on file   Social History Narrative    Not on file     Social Determinants of Health     Financial Resource Strain: Not on file   Food Insecurity: Not on file   Transportation Needs: Not on file   Physical Activity: Not on file   Stress: Not on file   Social Connections: Not on file   Intimate Partner Violence: Not on file   Housing Stability: Not on file         Family History:       Problem Relation Age of Onset    Heart Disease Mother     Heart Disease Father     Heart Disease Brother     Heart Attack Brother        REVIEW OF SYSTEMS:    General ROS: negative  Hematological and Lymphatic ROS: negative  Endocrine ROS: negative  Respiratory ROS: no cough,  wheezing  or shortness of breath,   Cardiovascular ROS: no chest pain or dyspnea on exertion  Gastrointestinal ROS: no abdominal pain, change in bowel habits, or black or bloody stools  Genito-Urinary ROS: no dysuria, trouble voiding, or hematuria  Neurological ROS: no TIA or stroke symptoms  negative    Vitals:  /63   Pulse 72   Temp (!) 96.6 °F (35.9 °C) (Temporal)   Resp 18   Ht 5' 8\" (1.727 m)   Wt 152 lb 12.5 oz (69.3 kg)   SpO2 100%   BMI 23.23 kg/m²     PHYSICAL EXAM:  General:  Awake, alert, oriented X 3. Well developed, well nourished, well groomed. No apparent distress. HEENT:  Normocephalic, atraumatic. Pupils equal, round, reactive to light. No scleral icterus. No conjunctival injection. Neck:  Supple, no carotid bruits  Heart:  RRR,   Lungs:  CTA bilaterally, bilat symmetrical expansion, no wheeze, rales, or rhonchi  Abdomen:   Bowel sounds present, soft, nontender, no masses, no organomegaly, no peritoneal signs  Extremities:  No clubbing, cyanosis, 2+ pitting edema bilaterally  Skin:  Warm and dry, no open lesions or rash  Neuro:  Cranial nerves 2-12 intact, no focal deficits      DATA:     Recent Results (from the past 24 hour(s))   Brain Natriuretic Peptide    Collection Time: 01/08/23  6:30 PM   Result Value Ref Range    Pro-BNP 2,973 (H) 0 - 450 pg/mL   CBC with Auto Differential    Collection Time: 01/08/23  6:30 PM   Result Value Ref Range    WBC 6.9 4.5 - 11.5 E9/L    RBC 4.25 3.80 - 5.80 E12/L    Hemoglobin 12.6 12.5 - 16.5 g/dL    Hematocrit 39.3 37.0 - 54.0 %    MCV 92.5 80.0 - 99.9 fL    MCH 29.6 26.0 - 35.0 pg    MCHC 32.1 32.0 - 34.5 %    RDW 16.5 (H) 11.5 - 15.0 fL    Platelets 106 851 - 699 E9/L    MPV 9.7 7.0 - 12.0 fL    Neutrophils % 70.7 43.0 - 80.0 %    Immature Granulocytes % 0.7 0.0 - 5.0 %    Lymphocytes % 17.2 (L) 20.0 - 42.0 %    Monocytes % 9.8 2.0 - 12.0 %    Eosinophils % 0.9 0.0 - 6.0 %    Basophils % 0.7 0.0 - 2.0 %    Neutrophils Absolute 4.88 1.80 - 7.30 E9/L    Immature Granulocytes # 0.05 E9/L    Lymphocytes Absolute 1.19 (L) 1.50 - 4.00 E9/L    Monocytes Absolute 0.68 0.10 - 0.95 E9/L    Eosinophils Absolute 0.06 0.05 - 0.50 E9/L    Basophils Absolute 0.05 0.00 - 0.20 E9/L   CMP    Collection Time: 01/08/23  6:30 PM   Result Value Ref Range    Sodium 145 132 - 146 mmol/L    Potassium 3.4 (L) 3.5 - 5.0 mmol/L    Chloride 106 98 - 107 mmol/L    CO2 27 22 - 29 mmol/L    Anion Gap 12 7 - 16 mmol/L    Glucose 127 (H) 74 - 99 mg/dL    BUN 20 6 - 23 mg/dL    Creatinine 1.1 0.7 - 1.2 mg/dL    Est, Glom Filt Rate 59 >=60 mL/min/1.73    Calcium 8.7 8.6 - 10.2 mg/dL    Total Protein 6.1 (L) 6.4 - 8.3 g/dL    Albumin 3.0 (L) 3.5 - 5.2 g/dL    Total Bilirubin 0.7 0.0 - 1.2 mg/dL    Alkaline Phosphatase 76 40 - 129 U/L    ALT 12 0 - 40 U/L    AST 22 0 - 39 U/L   Troponin    Collection Time: 01/08/23  6:30 PM   Result Value Ref Range Troponin, High Sensitivity 52 (H) 0 - 11 ng/L   Protime-INR    Collection Time: 01/08/23  6:30 PM   Result Value Ref Range    Protime 13.6 (H) 9.3 - 12.4 sec    INR 1.3    COVID-19, Rapid    Collection Time: 01/08/23  6:30 PM    Specimen: Nasopharyngeal Swab   Result Value Ref Range    SARS-CoV-2, NAAT Not Detected Not Detected   RAPID INFLUENZA A/B ANTIGENS    Collection Time: 01/08/23  6:30 PM    Specimen: Nasopharyngeal   Result Value Ref Range    Influenza A by PCR Not Detected Not Detected    Influenza B by PCR Not Detected Not Detected   EKG 12 Lead    Collection Time: 01/08/23  6:53 PM   Result Value Ref Range    Ventricular Rate 71 BPM    Atrial Rate 71 BPM    QRS Duration 90 ms    Q-T Interval 396 ms    QTc Calculation (Bazett) 430 ms    R Axis 9 degrees    T Axis -127 degrees   Troponin    Collection Time: 01/08/23  8:56 PM   Result Value Ref Range    Troponin, High Sensitivity 51 (H) 0 - 11 ng/L   Basic Metabolic Panel    Collection Time: 01/09/23  4:43 AM   Result Value Ref Range    Sodium 148 (H) 132 - 146 mmol/L    Potassium 3.4 (L) 3.5 - 5.0 mmol/L    Chloride 109 (H) 98 - 107 mmol/L    CO2 30 (H) 22 - 29 mmol/L    Anion Gap 9 7 - 16 mmol/L    Glucose 93 74 - 99 mg/dL    BUN 18 6 - 23 mg/dL    Creatinine 1.0 0.7 - 1.2 mg/dL    Est, Glom Filt Rate >60 >=60 mL/min/1.73    Calcium 8.2 (L) 8.6 - 10.2 mg/dL       XR CHEST PORTABLE   Final Result   Moderate right pleural effusion and small left pleural effusion. Mild pneumonitis in right upper lobe. Suspect bilateral lower lobe atelectasis and/or pneumonitis. ASSESSMENT :      Principal Problem:    Acute on chronic diastolic (congestive) heart failure (HCC)  Active Problems: Moderate protein-calorie malnutrition ([address]  Resolved Problems:    * No resolved hospital problems.  *    Aortic stenosis status post TAVR surgery recently  Chronic hypoxia  Moderate protein calorie malnutrition      Plan :    [address]  Cardiology to see  He is s/p TAVR 10/22 at [facility]      Electronically signed by Patricio Evangelista MD on 1/9/2023 at 12:52 PM    NOTE: This report was transcribed using voice recognition software.  Every effort was made to ensure accuracy; however, inadvertent transcription errors may be present

## 2023-01-09 NOTE — PATIENT CARE CONFERENCE
[facility] Quality Flow/Interdisciplinary Rounds Progress Note        Quality Flow Rounds held on January 9, 2023    Disciplines Attending:  Bedside Nurse, , , and Nursing Unit Leadership    Kaleb Stanton was admitted on 1/8/2023  6:17 PM    Anticipated Discharge Date:       Disposition:    Jose Score:  Jose Scale Score: 14    Readmission Risk              Risk of Unplanned Readmission:  15           Discussed patient goal for the day, patient clinical progression, and barriers to discharge.  The following Goal(s) of the Day/Commitment(s) have been identified:  Labs - Report Results      Sravanthi Mar RN  January 9, 2023

## 2023-01-09 NOTE — ACP (ADVANCE CARE PLANNING)
Advance Care Planning   Healthcare Decision Maker:    Primary Decision Maker: Gilford Fan - Child - [phone]    Click here to complete Healthcare Decision Makers including selection of the Healthcare Decision Maker Relationship (ie \"Primary\"). Today we documented Decision Maker(s) consistent with ACP documents on file.        CHRISTIAN Mcadams.S.W.  [phone]

## 2023-01-09 NOTE — CARE COORDINATION
Spoke with Pt and Dtr about Transition plan of Care. Pt lives alone with ramp to enter. Pt has a Rolator, www, and WC. PCP: Dr. Claudia Kahn. [facility]. Dtr is getting services through the [place] [facility]. [facility] Dtr said that Pt can eat whatever he wants per the Dr. Antoni Lyles. 3L resting comfortably up to 4L when walking or difficult time breathing. Discharge Plan is to return home with [facility] Frankel and [facility] when medically stable. SW/CM to follow for discharge needs.    Eddy Perez, L.S.W.  [phone]

## 2023-01-09 NOTE — DISCHARGE INSTRUCTIONS
HEART FAILURE  / CONGESTIVE HEART FAILURE  DISCHARGE INSTRUCTIONS:  GUIDELINES TO FOLLOW AT HOME    Self- Managed Care:     MEDICATIONS:  Take your medication as directed. If you are experiencing any side effects, inform your doctor, Do not stop taking any of your medications without letting your doctor know. Check with your doctor before taking any over-the-counter medications / herbal / or dietary supplements. They may interfere with your other medications. Do not take ibuprofen (Advil or Motrin) and naproxen (Aleve) without talking to your doctor first. They could make your heart failure worse. WEIGHT MONITORING:   Weigh yourself everyday (with the same scale) around the same time of the day and write it down. (you can chart them on a calendar or keep track of them on paper. Notify your doctor of a weight gain of 3 pounds or more in 1 day   OR a total of 5 pounds or more in 1 week    Take your weight record to your doctor visits  Also, the same goes if you loose more than 3# in one day, let your heart doctor know. DIET:   Cardiac heart healthy diet- Low saturated / low trans fat, no added salt, caffeine restricted, Low sodium diet-   No more than 2,000mg (2 grams) of salt / sodium per day (which equals to a little less than  a teaspoon of salt)  If your doctor wants you on a fluid restriction. ..it is usually recommended a fluid limit of 2,000cc -  Fluid restriction- 2,000 ml (milliliters) = 64 ounces = you can have 8 glasses of fluid per day (each glass 8 ounces)    Follow a low salt diet - avoid using salt at the table, avoid / limit use of canned soups, processed / packaged foods, salted snacks, olives and pickles. Do not use a salt substitute without checking with your doctor, they may contain a high amount of potassioum. (Mrs. Daina Buchanan is safe to use).     Limit the use of alcohol       CALL YOUR DOCTOR THE FIRST DAY YOU NOTICE ANY OF THESE   SYMPTOMS:  You have a weight gain of 3 pounds or more in 1 day         OR 5 pounds or more in one week  More shortness of breath  More swelling of your stomach, legs, ankles or feet  Feeling more tired, No energy  Dry hacky cough  Dizziness  More chest pain / discomfort       (CALL 911 IF ANY OF THE FOLLOWING OCCURS  Chest pain (not relieved with nitroglycerine, if you have been prescribed this medication)  Severe shortness of breath  Faint / Pass out  Confusion / cannot think clearly  If symptoms get worse           SMOKING - TOBACCO USE:  * IF YOU SMOKE - STOP! Kick the habit. [address] Program is offered at [facility] [address] and [address] [facility]. Call [phone] extension [phone] for more information. ACTIVITY:   (Ask your doctor when you will be able to return to work and before starting any exercise program.  Do not drive unless unless your doctor has given you permission to do so). Start light exercise. Even if you can only do a small amount, exercise will help you get stronger, have more energy, help manage your weight and decrease  stress. Walking is an easy way to get exercise. Start out slowly and  increase the amount you walk as tolerated  If you become short of breath, dizzy or have chest pain; stop, sit down, and rest.  If you feel \"wiped out\" the day after you exercise, walk at a slower pace or for a shorter distance. You can gradually increase the pace or amount of time. (Do not exercise right after a meal or in extreme temperatures, such as above 85 degrees, if the air is really humid, or wind chill is less than 20 degrees)                                             ADDITIONAL INFORMATION:  Avoid getting sick from colds and the flu. Stay away from friends or family that you know may have a contagious illness  Get plenty of rest   Get a flu shot each year. Get a pneumococcal vaccine shot.  If you have had one before, ask your doctor whether you need another dose. My Goal for Self-management of Heart Failure Includes 5 steps :    1. Notice a change in symptoms ( weight gain, short of breath, leg swelling, decreased activity level, bloating. ...)    2. Evaluate the change: (use the Heart Failure Zones )     3. Decide to take action: decide what your options are, such as: (call your doctor for an extra visit, take a prescribed medication, such as your water pill if your doctor has given you directions to do so, [address])    4. Come up with a strategy:  (now you call the doctor for advice / appointment. This is where you take action!!! Do not wait, catch the symptom early and treat it before it worsens. 5. Evaluate the response: The next day, check your Heart Failure Zones: are you in the GREEN ZONE (safe zone)? Worsening symptoms of YELLOW ZONE? Or have you moved to the RED ZONE and need to call 911 or go to the Emergency Room for evaluation? Call your doctor's office to update them on your symptoms of heart failure. Your information:  Name: Vadim Peterson  : 1919    Your instructions:  Home care for dressing change:    Left dorsal foot: clean with normal saline, apply xeroform (Cut to the size of the wound) then cover with dry dressing and wrap with kerlix. Change dressing daily    What to do after you leave the hospital:    Recommended diet: {diet:53386}    Recommended activity: {discharge activity:49945}        The following personal items were collected during your admission and were returned to you:    Belongings  Dental Appliances: Uppers, Lowers  Vision - Corrective Lenses: Eyeglasses  Hearing Aid: Bilateral hearing aids (sometimes)  Clothing: Footwear, Pants, Shirt  Jewelry: Ring  Electronic Devices: None  Weapons (Notify Protective Services/Security): None  Home Medications: None  Valuables Given To:  Other (Comment)  Provide Name(s) of Who Valuable(s) Were Given To: n/a    Information obtained by:  By signing below, I understand that if any problems occur once I leave the hospital I am to contact a medical doctor or call 911 if an .  I understand and acknowledge receipt of the instructions indicated above.

## 2023-01-10 LAB
ANION GAP SERPL CALCULATED.3IONS-SCNC: 10 MMOL/L (ref 7–16)
BUN BLDV-MCNC: 23 MG/DL (ref 6–23)
CALCIUM SERPL-MCNC: 8.4 MG/DL (ref 8.6–10.2)
CHLORIDE BLD-SCNC: 105 MMOL/L (ref 98–107)
CO2: 30 MMOL/L (ref 22–29)
CREAT SERPL-MCNC: 1.1 MG/DL (ref 0.7–1.2)
GFR SERPL CREATININE-BSD FRML MDRD: 59 ML/MIN/1.73
GLUCOSE BLD-MCNC: 93 MG/DL (ref 74–99)
POTASSIUM SERPL-SCNC: 3.9 MMOL/L (ref 3.5–5)
SODIUM BLD-SCNC: 145 MMOL/L (ref 132–146)

## 2023-01-10 PROCEDURE — 80048 BASIC METABOLIC PNL TOTAL CA: CPT

## 2023-01-10 PROCEDURE — 6360000002 HC RX W HCPCS: Performed by: INTERNAL MEDICINE

## 2023-01-10 PROCEDURE — 2140000000 HC CCU INTERMEDIATE R&B

## 2023-01-10 PROCEDURE — 2580000003 HC RX 258: Performed by: INTERNAL MEDICINE

## 2023-01-10 PROCEDURE — 99222 1ST HOSP IP/OBS MODERATE 55: CPT | Performed by: INTERNAL MEDICINE

## 2023-01-10 PROCEDURE — 2700000000 HC OXYGEN THERAPY PER DAY

## 2023-01-10 PROCEDURE — 6370000000 HC RX 637 (ALT 250 FOR IP): Performed by: INTERNAL MEDICINE

## 2023-01-10 PROCEDURE — 36415 COLL VENOUS BLD VENIPUNCTURE: CPT

## 2023-01-10 RX ORDER — FLUTICASONE PROPIONATE 50 MCG
1 SPRAY, SUSPENSION (ML) NASAL DAILY
Status: DISCONTINUED | OUTPATIENT
Start: 2023-01-10 | End: 2023-01-16 | Stop reason: HOSPADM

## 2023-01-10 RX ORDER — IPRATROPIUM BROMIDE AND ALBUTEROL SULFATE 2.5; .5 MG/3ML; MG/3ML
1 SOLUTION RESPIRATORY (INHALATION) EVERY 4 HOURS PRN
Status: DISCONTINUED | OUTPATIENT
Start: 2023-01-10 | End: 2023-01-16 | Stop reason: HOSPADM

## 2023-01-10 RX ADMIN — FUROSEMIDE 20 MG: 10 INJECTION, SOLUTION INTRAMUSCULAR; INTRAVENOUS at 08:44

## 2023-01-10 RX ADMIN — PANTOPRAZOLE SODIUM 40 MG: 40 TABLET, DELAYED RELEASE ORAL at 05:31

## 2023-01-10 RX ADMIN — FUROSEMIDE 20 MG: 10 INJECTION, SOLUTION INTRAMUSCULAR; INTRAVENOUS at 18:25

## 2023-01-10 RX ADMIN — ENOXAPARIN SODIUM 40 MG: 100 INJECTION SUBCUTANEOUS at 08:44

## 2023-01-10 RX ADMIN — Medication 10 ML: at 20:46

## 2023-01-10 RX ADMIN — ASPIRIN 81 MG CHEWABLE TABLET 81 MG: 81 TABLET CHEWABLE at 20:45

## 2023-01-10 RX ADMIN — FLUTICASONE PROPIONATE 1 SPRAY: 50 SPRAY, METERED NASAL at 18:25

## 2023-01-10 RX ADMIN — Medication 1 CAPSULE: at 08:45

## 2023-01-10 RX ADMIN — Medication 10 ML: at 08:45

## 2023-01-10 RX ADMIN — TAMSULOSIN HYDROCHLORIDE 0.4 MG: 0.4 CAPSULE ORAL at 20:45

## 2023-01-10 NOTE — PLAN OF CARE
Problem: Chronic Conditions and Co-morbidities  Goal: Patient's chronic conditions and co-morbidity symptoms are monitored and maintained or improved  1/10/2023 1019 by Shani Valles RN  Outcome: Progressing  Flowsheets (Taken 1/10/2023 0845)  Care Plan - Patient's Chronic Conditions and Co-Morbidity Symptoms are Monitored and Maintained or Improved: Monitor and assess patient's chronic conditions and comorbid symptoms for stability, deterioration, or improvement  1/9/2023 2338 by Rodriguez Bell RN  Outcome: Progressing     Problem: Discharge Planning  Goal: Discharge to home or other facility with appropriate resources  Outcome: Progressing  Flowsheets (Taken 1/10/2023 0845)  Discharge to home or other facility with appropriate resources: Identify barriers to discharge with patient and caregiver     Problem: ABCDS Injury Assessment  Goal: Absence of physical injury  1/10/2023 1019 by Shani Valles RN  Outcome: Progressing  Flowsheets (Taken 1/10/2023 1018)  Absence of Physical Injury: Implement safety measures based on patient assessment  1/9/2023 2338 by Rodriguez Bell RN  Outcome: Progressing     Problem: Skin/Tissue Integrity  Goal: Absence of new skin breakdown  Description: 1. Monitor for areas of redness and/or skin breakdown  2. Assess vascular access sites hourly  3. Every 4-6 hours minimum:  Change oxygen saturation probe site  4. Every 4-6 hours:  If on nasal continuous positive airway pressure, respiratory therapy assess nares and determine need for appliance change or resting period.   1/10/2023 1019 by Shani Valles RN  Outcome: Progressing  1/9/2023 2338 by Rodriguez Bell RN  Outcome: Progressing     Problem: Safety - Adult  Goal: Free from fall injury  Outcome: Progressing  Flowsheets (Taken 1/10/2023 1018)  Free From Fall Injury: Instruct family/caregiver on patient safety     Problem: Cardiovascular - Adult  Goal: Maintains optimal cardiac output and hemodynamic stability  Outcome: Progressing  Flowsheets (Taken 1/10/2023 0845)  Maintains optimal cardiac output and hemodynamic stability:   Monitor blood pressure and heart rate   Monitor urine output and notify Licensed Independent Practitioner for values outside of normal range     Problem: Metabolic/Fluid and Electrolytes - Adult  Goal: Hemodynamic stability and optimal renal function maintained  Outcome: Progressing  Flowsheets (Taken 1/10/2023 0845)  Hemodynamic stability and optimal renal function maintained:   Monitor labs and assess for signs and symptoms of volume excess or deficit   Monitor intake, output and patient weight     Problem: Pain  Goal: Verbalizes/displays adequate comfort level or baseline comfort level  Outcome: Progressing     Problem: Nutrition Deficit:  Goal: Optimize nutritional status  Outcome: Progressing

## 2023-01-10 NOTE — PLAN OF CARE
Problem: Chronic Conditions and Co-morbidities  Goal: Patient's chronic conditions and co-morbidity symptoms are monitored and maintained or improved  Outcome: Progressing     Problem: ABCDS Injury Assessment  Goal: Absence of physical injury  Outcome: Progressing     Problem: Skin/Tissue Integrity  Goal: Absence of new skin breakdown  Description: 1. Monitor for areas of redness and/or skin breakdown  2. Assess vascular access sites hourly  3. Every 4-6 hours minimum:  Change oxygen saturation probe site  4. Every 4-6 hours:  If on nasal continuous positive airway pressure, respiratory therapy assess nares and determine need for appliance change or resting period.   Outcome: Progressing

## 2023-01-10 NOTE — PATIENT CARE CONFERENCE
P Quality Flow/Interdisciplinary Rounds Progress Note        Quality Flow Rounds held on January 10, 2023    Disciplines Attending:  Bedside Nurse, , , and Nursing Unit Leadership    Isa Calloway was admitted on 1/8/2023  6:17 PM    Anticipated Discharge Date:       Disposition:    Jose Score:  Jose Scale Score: 13    Readmission Risk              Risk of Unplanned Readmission:  15           Discussed patient goal for the day, patient clinical progression, and barriers to discharge.   The following Goal(s) of the Day/Commitment(s) have been identified:  report labs/diagnostics       Dawn Mittal RN  January 10, 2023

## 2023-01-10 NOTE — CONSULTS
CARDIOLOGY CONSULTATION    Patient Name:  Derwin Fabry    :  1919    Reason for Consultation:   Aortic valve disease SP TAVR; permanent atrial fibrillation; recurrent shortness of breath    History of Present Illness: Derwin Fabry returns to [place] [address], following history of increasing shortness of breath and abdominal distention over the past week or so prior to admission. He has known valvular heart disease and underwent a recent TAVR without complication. He also has longstanding permanent atrial fibrillation as well as bilateral vertebral artery stenosis. 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 He now presents for reevaluation of his cardiac status as to the etiology of his increasing shortness of breath. He denies any chest discomfort nor lightheadedness nor palpitations. Past Medical History:   has a past medical history of Acute blood loss anemia, Anemia, Aortic stenosis, Arthritis, Cm's esophagus with esophagitis, Cancer (HCC), Cellulitis, Dizziness, Easy bruising, Gastrointestinal hemorrhage, GERD (gastroesophageal reflux disease), H/O emphysema, Heart murmur, Heartburn, History of stress test, Lightheadedness, Macular degeneration, Passed out, Stroke [facility]), and Unspecified cerebral artery occlusion with cerebral infarction. Surgical History:   has a past surgical history that includes Hemorrhoid surgery; Tonsillectomy and adenoidectomy; Cataract removal; Upper gastrointestinal endoscopy; ERCP (N/A, 3/11/2020); ERCP (3/11/2020); joint replacement; ERCP (N/A, 2020); ERCP (N/A, 2020); ERCP (N/A, 2/3/2021); ERCP (N/A, 2/3/2021); ERCP (N/A, 2/3/2021); Endoscopy, colon, diagnostic; fracture surgery (); Cholecystectomy, laparoscopic (N/A, 2021); ERCP (N/A, 2021); and ERCP (N/A, 2021). Social History:   reports that he has never smoked.  He has never used smokeless tobacco. He reports that he does not drink alcohol and does not use drugs. Family History:  family history includes Heart Attack in his brother; Heart Disease in his brother, father, and mother. Father also had cancer and mother  secondary to infirmities of old age. Medications:  Prior to Admission medications    Medication Sig Start Date End Date Taking? Authorizing Provider   bumetanide (BUMEX) 1 MG tablet Take 1 tablet by mouth in the morning. 22   Zoran Doherty DO   furosemide (LASIX) 20 MG tablet Take 1 tablet by mouth in the morning. 22  Elizabeth Fortune MD   aspirin 81 MG chewable tablet Take 81 mg by mouth in the morning. Historical Provider, MD   Probiotic Product (CULTURELLE PROBIOTICS PO) Take by mouth    Historical Provider, MD   tamsulosin (FLOMAX) 0.4 MG capsule TAKE ONE CAPSULE BY MOUTH AT BEDTIME 3/3/21   Historical Provider, MD   omeprazole (PRILOSEC) 40 MG delayed release capsule TAKE ONE CAPSULE BY MOUTH EVERY DAY 20   Historical Provider, MD   vitamin E 1000 units capsule Take 1,000 Units by mouth daily    Historical Provider, MD   Multiple Vitamins-Minerals (MULTIVITAMIN PO) Take 1 tablet by mouth daily     Historical Provider, MD   Multiple Vitamins-Minerals (PRESERVISION AREDS) CAPS Take 1 capsule by mouth daily    Historical Provider, MD   Vitamin D (CHOLECALCIFEROL) 1000 UNITS CAPS capsule Take 1,000 Units by mouth 2 times daily    Historical Provider, MD       Allergies:  Patient has no known allergies. Review of Systems:   Constitutional: there has been no unanticipated weight loss. There's been no significant change in energy level, sleep pattern or activity level. No fever chills or rigors. Eyes: No visual changes or diplopia. No scleral icterus. ENT: No Headaches, hearing loss or vertigo. No mouth sores or sore throat. No change in taste or smell.   Cardiovascular: No chest discomfort, +dyspnea on exertion and now even at rest, deniespalpitations, loss of consciousness, no phlebitis, no claudication. Respiratory: No cough or wheezing, no sputum production. No hemoptysis, pleuritic pain. Gastrointestinal: No abdominal pain, appetite loss, + blood in stools. No change in bowel habits. No hematemesis  Genitourinary: No dysuria, trouble voiding or hematuria. No nocturia or increased frequency. Musculoskeletal:  No gait disturbance, weakness or joint complaints. Integumentary: No rash or pruritis. Neurological: No headache, diplopia, change in muscle strength, numbness or tingling. No change in gait, balance, coordination, mood, affect, memory, mentation, behavior. Psychiatric: No anxiety or depression. Endocrine: No temperature intolerance. No excessive thirst, fluid intake, or urination. No tremor. Hematologic/Lymphatic: No abnormal bruising or bleeding, blood clots or swollen lymph nodes. Allergic/Immunologic: No nasal congestion or hives. Physical Examination:    Vital Signs: BP (!) 149/75   Pulse 72   Temp 97.5 °F (36.4 °C) (Temporal)   Resp 26   Ht 5' 8\" (1.727 m)   Wt 152 lb 12.5 oz (69.3 kg)   SpO2 99%   BMI 23.23 kg/m²   General appearance: Well preserved, mesomorphic body habitus, alert, no distress. Skin: Skin color, texture, turgor normal. No rashes or lesions. No induration or tightening palpated. Head: Normocephalic. No masses, lesions, tenderness or abnormalities  Eyes: Conjunctivae/corneas clear. PERRL, EOMs intact. Sclera non icteric. Ears: External ears normal. Canals clear. TM's clear bilaterally. Hearing normal to finger rub. Nose/Sinuses: Nares normal. Septum midline. Mucosa normal. No drainage or sinus tenderness. Oropharynx: Lips, mucosa, and tongue normal. Oropharynx clear with no exudate seen. Neck: Neck supple and symmetric. No adenopathy. Thyroid symmetric, normal size, without nodules. Trachea is midline. Carotids brisk in upstroke without bruits, no abnormal JVP noted at 45°.   Chest: Even excursion  Lungs: Lungs with decreased breath sounds in right base. . No retractions or use of accessory muscles. No tactile vocal fremitus. No rhonchi, crackles or rales. Heart:  S1 > S2. Irregular, irregular rhythm S4 gallop; grade 1-2/6 early harsh systolic murmur heard a in the second right intercostal space. No diastolic murmurs noted. . No rub, palpable thrill or heave noted. PMI 5th intercostal space midclavicular line. Abdomen: Abdomen soft, mildly protuberant, non-tender. BS normal. No masses, organomegaly. No hernia noted. Extremities: Extremities normal. No deformities, 1 - 2+ bilateral pretibial edema, mild skin discoloration. No cyanosis or clubbing noted to the nails. Peripheral pulses present 2+ upper extremities and present 1+  lower extremities. Musculoskeletal: Spine ROM normal. Muscular strength intact. Neuro: Cranial nerves intact. Motor: Strength 5/5 in all extremities. Reflexes 2+ in all extremities. No focal weakness. Sensory: grossly normal to touch. Coordination intact. Pertinent Labs:  CBC:   Recent Labs     01/08/23  1830   WBC 6.9   HGB 12.6        BMP:  Recent Labs     01/08/23  1830 01/09/23  0443    148*   K 3.4* 3.4*    109*   CO2 27 30*   BUN 20 18   CREATININE 1.1 1.0   GLUCOSE 127* 93   LABGLOM 59 >60     ABGs: No results found for: PH, PO2, PCO2  INR:   Recent Labs     01/08/23  1830   INR 1.3     PRO-BNP:   Lab Results   Component Value Date    PROBNP 2,973 (H) 01/08/2023    PROBNP 1,232 (H) 08/07/2022      Cardiac Injury Profile:   No results for input(s): CKTOTAL, CKMB, CKMBINDEX, TROPONINI in the last 72 hours.      Lipid Profile:   Lab Results   Component Value Date/Time    TRIG 70 12/09/2022 12:30 PM    HDL 43 12/09/2022 12:30 PM    LDLCALC 76 12/09/2022 12:30 PM    CHOL 133 12/09/2022 12:30 PM      Thyroid:   Lab Results   Component Value Date    TSH 2.460 12/09/2022      Hemoglobin A1C: No components found for: HGBA1C   ECG:  See report    Radiology:    XR CHEST PORTABLE    Result Date: 1/13/2023  EXAMINATION: ONE XRAY VIEW OF THE CHEST 1/13/2023 11:54 am COMPARISON: 01/08/2023 HISTORY: ORDERING SYSTEM PROVIDED HISTORY: Thoracentesis TECHNOLOGIST PROVIDED HISTORY: Reason for exam:->Thoracentesis What reading provider will be dictating this exam?->CRC FINDINGS: There is marked reduction of right pleural effusion following thoracentesis. Right apical pneumothorax is seen estimated at 15-20% although this might be related to trapped lung. There are moderate bilateral pleural effusions. Bibasilar parenchymal opacities are noted that could be due to atelectasis edema or pneumonia. There is mild vascular congestion. 1. Decreased right pleural effusion post thoracentesis. Right pneumothorax, estimated at 15-20%. Clinical correlation and follow-up study is suggested. 2. Moderate bilateral pleural effusions and bibasilar infiltrates. Mild vascular congestion. US THORACENTESIS Which side should the procedure be performed? Right    Result Date: 1/13/2023  PROCEDURE: ULTRASOUNDGUIDED RIGHT THORACENTESIS 1/13/2023 HISTORY: ORDERING SYSTEM PROVIDED HISTORY: right sided pleural effusion TECHNOLOGIST PROVIDED HISTORY: Reason for exam:->right sided pleural effusion Which side should the procedure be performed?->Right What reading provider will be dictating this exam?->CRC TECHNIQUE: Sonographic examination of the right chest was performed. FINDINGS: Sonographic examination of the right chest demonstrates pleural effusion. The thoracentesis site was marked on the skin. Ultrasound-guided thoracentesis was performed by Dr. John Chandra. Ultrasound-guided marking for right thoracentesis. Assessment:    Principal Problem:    Acute on chronic diastolic (congestive) heart failure (HCC)  Active Problems: Moderate protein-calorie malnutrition ([address]  Resolved Problems:    * No resolved hospital problems. *      Plan:  Based upon Mr. Stanton's clinical presentation it would appear that his shortness of breath may indeed be from left ventricular diastolic dysfunction post TAVR but may also have a secondary cause including that of anemia and/or pulmonary emboli. Will reevaluate and intervene as clinically warranted. I have spent more than 55 minutes face to face with Shalom Parra reviewing notes and laboratory data with greater than 50% of this time instructing and counseling the patient regarding my findings and recommendations and I have answered all questions as posed to me by Mr. Stanton. Thank you, Gregor Valencia MD and Kim Lau MD for allowing me to consult in the care of this patient. Bhavya Haq DO DO, FACP, [facility], [facility]    NOTE:  This report was transcribed using voice recognition software. Every effort was made to ensure accuracy; however, inadvertent computerized transcription errors may be present.

## 2023-01-10 NOTE — PROGRESS NOTES
Subjective:    Chief complaint:    Seen earlier this morning  Feels slightly better  No problems overnight.  Denies chest pain, angina, and dyspnea.  Denies abdominal pain.  Tolerating diet.  No nausea or vomiting.    Objective:    /71   Pulse 89   Temp 97 °F (36.1 °C) (Temporal)   Resp 18   Ht 5' 8\" (1.727 m)   Wt 148 lb 9.4 oz (67.4 kg)   SpO2 97%   BMI 22.59 kg/m²   General : Awake ,alert,no distress.  Heart: Irregular, no murmurs, gallops, or rubs.  Lungs:  CTA bilaterally, no wheeze, rales or rhonchi  Abd: bowel sounds present, nontender, nondistended, no masses  Extrem:  No clubbing, cyanosis, 1+ edema bilaterally    CBC:   Lab Results   Component Value Date/Time    WBC 6.9 01/08/2023 06:30 PM    RBC 4.25 01/08/2023 06:30 PM    HGB 12.6 01/08/2023 06:30 PM    HCT 39.3 01/08/2023 06:30 PM    MCV 92.5 01/08/2023 06:30 PM    MCH 29.6 01/08/2023 06:30 PM    MCHC 32.1 01/08/2023 06:30 PM    RDW 16.5 01/08/2023 06:30 PM     01/08/2023 06:30 PM    MPV 9.7 01/08/2023 06:30 PM     BMP:    Lab Results   Component Value Date/Time     01/10/2023 05:23 AM    K 3.9 01/10/2023 05:23 AM    K 4.0 08/07/2022 04:40 PM     01/10/2023 05:23 AM    CO2 30 01/10/2023 05:23 AM    BUN 23 01/10/2023 05:23 AM    LABALBU 3.0 01/08/2023 06:30 PM    LABALBU 4.3 08/03/2011 07:55 AM    CREATININE 1.1 01/10/2023 05:23 AM    CALCIUM 8.4 01/10/2023 05:23 AM    GFRAA >60 08/10/2022 05:33 AM    LABGLOM 59 01/10/2023 05:23 AM    GLUCOSE 93 01/10/2023 05:23 AM    GLUCOSE 90 08/03/2011 07:55 AM     PT/INR:    Lab Results   Component Value Date/Time    PROTIME 13.6 01/08/2023 06:30 PM    INR 1.3 01/08/2023 06:30 PM     Troponin:    Lab Results   Component Value Date/Time    TROPONINI <0.01 02/01/2021 12:33 PM       No results for input(s): LABURIN in the last 72 hours.  No results for input(s): BC in the last 72 hours.  No results for input(s): BLOODCULT2 in the last 72 hours.      Current Facility-Administered  Medications:     fluticasone (FLONASE) 50 MCG/ACT nasal spray 1 spray, 1 spray, Each Nostril, Daily, Jean Espinoza MD    aspirin chewable tablet 81 mg, 81 mg, Oral, Daily, Gamal Mishra DO, 81 mg at 01/09/23 2030    ocuvite-lutein multivitamin 1 capsule, 1 capsule, Oral, Daily, Gamal Mishra DO, 1 capsule at 01/10/23 0845    tamsulosin (FLOMAX) capsule 0.4 mg, 0.4 mg, Oral, Daily, Gamal Mishra DO, 0.4 mg at 01/09/23 2030    furosemide (LASIX) injection 20 mg, 20 mg, IntraVENous, BID, Gamal Mishra DO, 20 mg at 01/10/23 0844    sodium chloride flush 0.9 % injection 5-40 mL, 5-40 mL, IntraVENous, 2 times per day, Mckeon Cousins, DO, 10 mL at 01/10/23 0845    sodium chloride flush 0.9 % injection 5-40 mL, 5-40 mL, IntraVENous, PRN, Gamal Mishra DO    0.9 % sodium chloride infusion, , IntraVENous, PRN, Gamal Mishra DO    enoxaparin (LOVENOX) injection 40 mg, 40 mg, SubCUTAneous, Daily, Gamal Mishra DO, 40 mg at 01/10/23 0844    ondansetron (ZOFRAN-ODT) disintegrating tablet 4 mg, 4 mg, Oral, Q8H PRN **OR** ondansetron (ZOFRAN) injection 4 mg, 4 mg, IntraVENous, Q6H PRN, Gamal Mishra DO    polyethylene glycol (GLYCOLAX) packet 17 g, 17 g, Oral, Daily PRN, Gamal Mishra DO    acetaminophen (TYLENOL) tablet 650 mg, 650 mg, Oral, Q6H PRN **OR** acetaminophen (TYLENOL) suppository 650 mg, 650 mg, Rectal, Q6H PRN, Gamal Mishra DO    pantoprazole (PROTONIX) tablet 40 mg, 40 mg, Oral, QAM AC, Gamal Mishra DO, 40 mg at 01/10/23 0531    ADULT DIET; Regular; Low Sodium (2 gm)  ADULT ORAL NUTRITION SUPPLEMENT; Lunch, Dinner; Standard High Calorie/High Protein Oral Supplement  ADULT ORAL NUTRITION SUPPLEMENT; Breakfast, Dinner; Wound Healing Oral Supplement    XR CHEST PORTABLE   Final Result   Moderate right pleural effusion and small left pleural effusion. Mild pneumonitis in right upper lobe. Suspect bilateral lower lobe atelectasis and/or pneumonitis. Assessment:    Principal Problem:    Acute on chronic diastolic (congestive) heart failure (HCC)  Active Problems: Moderate protein-calorie malnutrition ([address]  Resolved Problems:    * No resolved hospital problems. *  Right-sided pleural effusion      Plan:    Discussed with cardiology  Possible Limited echocardiogram  Diuresis  Renal function holding currently      Odilon Min MD  3:01 PM  1/10/2023    NOTE: This report was transcribed using voice recognition software.  Every effort was made to ensure accuracy; however, inadvertent transcription errors may be present

## 2023-01-10 NOTE — CONSULTS
[place]  Department of Internal Medicine  Division of Pulmonary, Critical Care and Sleep Medicine  Consult Note    Hector Ortiz DO, MPH, Leanna Dawsno, [facility]  Saturnino ASHLEY, Анна Hart MD      Patient: Veronica Ruano  MRN: [id number]  : 1919    Encounter Time: 12:23 PM     Date of Admission: 2023  6:17 PM    Primary Care Physician: Franny Mcgarry MD    Reason for Consultation: Right pleural effusion. HISTORY OF PRESENT ILLNESS : Veronica Ruano 80 y.o. male was seen in consultation regarding the above chief compliant. His daughter is present at bedside and assisted with history as the patient is hard of hearing. He presented to the ER on the  due to increasing shortness of breath. Per his daughter he had increased swelling of his legs over the last few days along with increasing oxygen requirements. He does not currently see a pulmonologist. He does have a history of emphysema seen on prior CT scans of the chest but has not had pfts. He has been following up at [facility] after having a TAVR . His dyspnea is significantly worse with exertion and with laying flat. He denies associated cough, wheezing, or congestion. Denies any fever or chills. He does live at home by himself with his daughter frequently checking in and some home health services. He did smoke briefly as a teenager and while he was in the service. 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 He previously worked in [employer].        PAST MEDICAL HISTORY:  has a past medical history of Acute blood loss anemia, Anemia, Aortic stenosis, Arthritis, Mc's esophagus with esophagitis, Cancer (HCC), Cellulitis, Dizziness, Easy bruising, Gastrointestinal hemorrhage, GERD (gastroesophageal reflux disease), H/O emphysema, Heart murmur, Heartburn, History of stress test, Lightheadedness, Macular degeneration, Passed out, Stroke ([facility] [address] and Unspecified cerebral artery occlusion with cerebral infarction. SURGICAL HISTORY:  has a past surgical history that includes Hemorrhoid surgery; Tonsillectomy and adenoidectomy; Cataract removal; Upper gastrointestinal endoscopy; ERCP (N/A, 3/11/2020); ERCP (3/11/2020); joint replacement; ERCP (N/A, 12/11/2020); ERCP (N/A, 12/11/2020); ERCP (N/A, 2/3/2021); ERCP (N/A, 2/3/2021); ERCP (N/A, 2/3/2021); Endoscopy, colon, diagnostic; fracture surgery (2013); Cholecystectomy, laparoscopic (N/A, 2/25/2021); ERCP (N/A, 2/25/2021); and ERCP (N/A, 2/25/2021). SOCIAL HISTORY:  reports that he has never smoked. He has never used smokeless tobacco. He reports that he does not drink alcohol and does not use drugs. FAMILY  HISTORY: family history includes Heart Attack in his brother; Heart Disease in his brother, father, and mother. MEDICATIONS:    Prior to Admission medications    Medication Sig Start Date End Date Taking? Authorizing Provider   bumetanide (BUMEX) 1 MG tablet Take 1 tablet by mouth in the morning. 8/12/22   Mike Harvey DO   furosemide (LASIX) 20 MG tablet Take 1 tablet by mouth in the morning. 8/11/22 8/11/22  Edwina Bernal MD   aspirin 81 MG chewable tablet Take 81 mg by mouth in the morning.     Historical Provider, MD   Probiotic Product (CULTURELLE PROBIOTICS PO) Take by mouth    Historical Provider, MD   tamsulosin (FLOMAX) 0.4 MG capsule TAKE ONE CAPSULE BY MOUTH AT BEDTIME 3/3/21   Jos Provider, MD   omeprazole (PRILOSEC) 40 MG delayed release capsule TAKE ONE CAPSULE BY MOUTH EVERY DAY 12/11/20   Historical Provider, MD   vitamin E 1000 units capsule Take 1,000 Units by mouth daily    Historical Provider, MD   Multiple Vitamins-Minerals (MULTIVITAMIN PO) Take 1 tablet by mouth daily     Jos Perdomo MD   Multiple Vitamins-Minerals (PRESERVISION AREDS) CAPS Take 1 capsule by mouth daily    Jos Provider, MD   Vitamin D (CHOLECALCIFEROL) 1000 UNITS CAPS capsule Take 1,000 Units by mouth 2 times daily    Historical Provider, MD       ALLERGIES: Patient has no known allergies. REVIEW OF SYSTEMS:  Otherwise negative if not reported or listed below  Constitutional:  No unanticipated weight loss. No change in sleep pattern or activity. No fevers, chills or rigors. Eyes:    No visual changes or diplopia. No scleral icterus. ENT:    No Headaches, hearing loss or vertigo. Cardiovascular:  No chest discomfort, palpitations. Respiratory:  Positive for increased shortness of breath, orthopnea. Gastrointestinal:  No abdominal pain, appetite loss, blood in stools. Genitourinary:  No dysuria, trouble voiding or hematuria. Musculoskeletal:   No weakness or joint complaints. 2-3+ edema of bilateral lower extremities. Integumentary: Positive for well circumscribed approximately 2 inch nummular lesion on top of left foot. Neurological:  No headache, numbness or tingling. Psychiatric:   No effect on mood, memory, mentation, or behavior. Endocrine:   No excessive thirst, fluid intake, or urination. No tremor. Hematologic: No abnormal bruising or bleeding. Lymphatic:  No swollen lymph nodes. OBJECTIVE:     PHYSICAL EXAM:   VITALS:   Vitals:    01/10/23 0309 01/10/23 0529 01/10/23 0710 01/10/23 1116   BP: 126/89  139/61 125/71   Pulse: 95  85 89   Resp: 16  18 18   Temp: 97.5 °F (36.4 °C)  97.2 °F (36.2 °C) 97 °F (36.1 °C)   TempSrc: Temporal  Temporal Temporal   SpO2: 100%  99% 97%   Weight:  148 lb 9.4 oz (67.4 kg)     Height:            Intake/Output Summary (Last 24 hours) at 1/10/2023 1223  Last data filed at 1/10/2023 1117  Gross per 24 hour   Intake 420 ml   Output 2200 ml   Net -1780 ml        CONSTITUTIONAL:   A&O x 3, NAD  SKIN:     No rash, Positive for well circumscribed approximately 2 inch nummular lesion on top of left foot. HEENT:     EOMI, MMM, No thrush. Patient is hard of hearing.   NECK:     No JVP appreciated  CV:      Irregular, systolic murmur present  PULMONARY:   Diminished in right base. Otherwise clear to auscultation. ABDOMEN:     Soft, non-tender. BS normal. No R/R/G  EXT:    No deformities . No clubbing. 2-3+ lower extremity edema, No venous stasis  PULSE:   Appears equal and palpable.   PSYCHIATRIC:  Seems appropriate, No acute psychosis  MS:    No fractures, No gross weakness  NEUROLOGIC:   The clinical assessment is non-focal     DATA: IMAGING & TESTING:     LABORATORY TESTS:    CBC with Differential:    Lab Results   Component Value Date/Time    WBC 6.9 01/08/2023 06:30 PM    RBC 4.25 01/08/2023 06:30 PM    HGB 12.6 01/08/2023 06:30 PM    HCT 39.3 01/08/2023 06:30 PM     01/08/2023 06:30 PM    MCV 92.5 01/08/2023 06:30 PM    MCH 29.6 01/08/2023 06:30 PM    MCHC 32.1 01/08/2023 06:30 PM    RDW 16.5 01/08/2023 06:30 PM    SEGSPCT 42 08/03/2011 07:55 AM    LYMPHOPCT 17.2 01/08/2023 06:30 PM    MONOPCT 9.8 01/08/2023 06:30 PM    BASOPCT 0.7 01/08/2023 06:30 PM    MONOSABS 0.68 01/08/2023 06:30 PM    LYMPHSABS 1.19 01/08/2023 06:30 PM    EOSABS 0.06 01/08/2023 06:30 PM    BASOSABS 0.05 01/08/2023 06:30 PM     CMP:    Lab Results   Component Value Date/Time     01/10/2023 05:23 AM    K 3.9 01/10/2023 05:23 AM    K 4.0 08/07/2022 04:40 PM     01/10/2023 05:23 AM    CO2 30 01/10/2023 05:23 AM    BUN 23 01/10/2023 05:23 AM    CREATININE 1.1 01/10/2023 05:23 AM    GFRAA >60 08/10/2022 05:33 AM    LABGLOM 59 01/10/2023 05:23 AM    GLUCOSE 93 01/10/2023 05:23 AM    GLUCOSE 90 08/03/2011 07:55 AM    PROT 6.1 01/08/2023 06:30 PM    LABALBU 3.0 01/08/2023 06:30 PM    LABALBU 4.3 08/03/2011 07:55 AM    CALCIUM 8.4 01/10/2023 05:23 AM    BILITOT 0.7 01/08/2023 06:30 PM    ALKPHOS 76 01/08/2023 06:30 PM    AST 22 01/08/2023 06:30 PM    ALT 12 01/08/2023 06:30 PM        PRO-BNP:   Lab Results   Component Value Date    PROBNP 2,973 (H) 01/08/2023    PROBNP 1,232 (H) 08/07/2022      ABGs: No results found for: PH, PO2, PCO2  Hemoglobin A1C: No components found for: HGBA1C    IMAGING:  Imaging tests were completed and reviewed and discussed radiology and care team involved and reveals   XR CHEST PORTABLE    Result Date: 1/8/2023  EXAMINATION: ONE XRAY VIEW OF THE CHEST 1/8/2023 5:47 pm COMPARISON: AP chest, 08/07/2022; chest two view, 04/14/2020 HISTORY: ORDERING SYSTEM PROVIDED HISTORY: shortness of breath TECHNOLOGIST PROVIDED HISTORY: Reason for exam:->shortness of breath What reading provider will be dictating this exam?->CRC FINDINGS: Lines, tubes, and devices: None. Lungs and pleura: There is moderate right pleural effusion which has increased. There is persistent small left pleural effusion. There is moderate pneumonitis in the right upper lobe. There is bilateral lower lobe atelectasis and/or pneumonitis. No pneumothorax. Cardiomediastinal silhouette: The mediastinum is stable. Normal cardiomediastinal silhouette. Bones and soft tissues: The bones are osteopenic. 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 Moderate right pleural effusion and small left pleural effusion. Mild pneumonitis in right upper lobe. Suspect bilateral lower lobe atelectasis and/or pneumonitis. Assessment:   Right sided pleural effusion   Emphysema  Elevated BNP  Atrial Fibrillation  S/P TAVR    Plan:   Currently on 4L NC at time of my evaluation. CXR reviewed above with moderate to large right sided  effusion along with small left sided effusion. Discussed risks and benefits of possible thoracentesis with the patient and his daughter. He is not currently on any anticoagulation other then lovenox for DVT prophylaxis. Would recommend continuing with diuretics and follow up of pleural effusion on imaging. If fails to improve would plan for thoracentesis in the upcoming days.    Regarding his emphysema is not on any inhaled therapies at home, will order to duonebs prn to have available if needed. Increase pulm hygiene with IS and Flutter valve. Records from [facility] requested. Thank you for the consult, we will continue to follow.       Alejandrina Koenig, DO   Pulmonary, Critical Care and Sleep Medicine

## 2023-01-10 NOTE — CARE COORDINATION
Pulmonary was consulted. Dtr reports that Pulmonary considering Thoracentesis. Discharge Plan continues as returning home with [facility] and [place] services. SW/CM will follow for discharge needs. Referral made to [id number],[facility].    [facility]  [phone]

## 2023-01-10 NOTE — FLOWSHEET NOTE
Inpatient Wound Care (initial consult) [id number]    Admit Date: 1/8/2023  6:17 PM    Reason for consult:  Buttock, left foot    Significant history:  Per H&P    HISTORY OF PRESENT ILLNESS:       The patient is a 80 y.o. male presents to the emergency room for shortness of breath. Patient has an underlying history of severe aortic stenosis and is status post TAVR surgery at [address] in October 2022. Patient has been 3 L of oxygen at baseline. Recently he was noted to have worsening bilateral lower extremity swelling. In the emergency room patient was evaluated. Patient was basically at baseline as far as oxygen requirements are concerned. Patient did have elevated BNP. He was noted to have leg edema. Patient was noted to have moderate right-sided pleural effusion. It was felt the patient was suffering from acute on chronic diastolic dysfunction. Patient was then admitted. At the time of questioning patient reports some improvement. Daughter is by the bedside.     Findings:     01/10/23 1350   Skin Integumentary    Skin Integrity Ecchymosis   Location BUE, right eye   Skin Integrity Site 2   Skin Integrity Location 2   (redness blanchable)   Location 2 bilateral buttocks   Wound 01/09/23 Foot Left;Dorsal   Date First Assessed/Time First Assessed: 01/09/23 0111   Present on Hospital Admission: Yes  Location: Foot  Wound Location Orientation: Left;Dorsal   Wound Image    Dressing/Treatment Dry dressing;Xeroform;Roll gauze   Wound Length (cm) 3.9 cm   Wound Width (cm) 4 cm   Wound Depth (cm) 0.1 cm   Wound Surface Area (cm^2) 15.6 cm^2   Wound Volume (cm^3) 1.56 cm^3   Wound Assessment Pink/red   Drainage Amount None   Odor None   Soniya-wound Assessment Dry/flaky     **Informed Consent**    The patient has given verbal consent to have photos taken of wound and inserted into their chart as part of their permanent medical record for purposes of documentation, treatment management and/or medical review. All Images taken on 1/10/23 of patient name: Chong Siddiqui were transmitted and stored on secured [employer] located within [facility] by a registered [employer] Mobile Application Device. Impression:  Left dorsal foot; partial thickness    Plan: Xeroform, dry dressing, kerlix  Barrier cream  TAPS  Heel protectors  Patient will need continued preventative care.       Mark Hastings RN 1/10/2023 2:06 PM

## 2023-01-10 NOTE — PROGRESS NOTES
Dr.Nallapaneni obrien served for request of flonase or something similar. Awaiting response at this time. Will continue to monitor.

## 2023-01-11 LAB
ANION GAP SERPL CALCULATED.3IONS-SCNC: 10 MMOL/L (ref 7–16)
BUN BLDV-MCNC: 32 MG/DL (ref 6–23)
CALCIUM SERPL-MCNC: 8.1 MG/DL (ref 8.6–10.2)
CHLORIDE BLD-SCNC: 103 MMOL/L (ref 98–107)
CO2: 30 MMOL/L (ref 22–29)
CREAT SERPL-MCNC: 1 MG/DL (ref 0.7–1.2)
GFR SERPL CREATININE-BSD FRML MDRD: >60 ML/MIN/1.73
GLUCOSE BLD-MCNC: 90 MG/DL (ref 74–99)
LV EF: 72 %
LVEF MODALITY: NORMAL
POTASSIUM SERPL-SCNC: 3.5 MMOL/L (ref 3.5–5)
SODIUM BLD-SCNC: 143 MMOL/L (ref 132–146)

## 2023-01-11 PROCEDURE — 2140000000 HC CCU INTERMEDIATE R&B

## 2023-01-11 PROCEDURE — 94669 MECHANICAL CHEST WALL OSCILL: CPT

## 2023-01-11 PROCEDURE — 6370000000 HC RX 637 (ALT 250 FOR IP): Performed by: INTERNAL MEDICINE

## 2023-01-11 PROCEDURE — 2580000003 HC RX 258: Performed by: INTERNAL MEDICINE

## 2023-01-11 PROCEDURE — 99233 SBSQ HOSP IP/OBS HIGH 50: CPT | Performed by: INTERNAL MEDICINE

## 2023-01-11 PROCEDURE — 80048 BASIC METABOLIC PNL TOTAL CA: CPT

## 2023-01-11 PROCEDURE — 94640 AIRWAY INHALATION TREATMENT: CPT

## 2023-01-11 PROCEDURE — 2700000000 HC OXYGEN THERAPY PER DAY

## 2023-01-11 PROCEDURE — 36415 COLL VENOUS BLD VENIPUNCTURE: CPT

## 2023-01-11 PROCEDURE — 6360000002 HC RX W HCPCS: Performed by: INTERNAL MEDICINE

## 2023-01-11 PROCEDURE — 93308 TTE F-UP OR LMTD: CPT

## 2023-01-11 RX ORDER — METOPROLOL SUCCINATE 25 MG/1
12.5 TABLET, EXTENDED RELEASE ORAL DAILY
Status: DISCONTINUED | OUTPATIENT
Start: 2023-01-11 | End: 2023-01-12

## 2023-01-11 RX ADMIN — Medication 1 CAPSULE: at 08:15

## 2023-01-11 RX ADMIN — IPRATROPIUM BROMIDE AND ALBUTEROL SULFATE 1 AMPULE: .5; 2.5 SOLUTION RESPIRATORY (INHALATION) at 14:04

## 2023-01-11 RX ADMIN — Medication 10 ML: at 08:15

## 2023-01-11 RX ADMIN — ASPIRIN 81 MG CHEWABLE TABLET 81 MG: 81 TABLET CHEWABLE at 20:26

## 2023-01-11 RX ADMIN — IPRATROPIUM BROMIDE AND ALBUTEROL SULFATE 1 AMPULE: .5; 2.5 SOLUTION RESPIRATORY (INHALATION) at 20:29

## 2023-01-11 RX ADMIN — Medication 10 ML: at 20:26

## 2023-01-11 RX ADMIN — TAMSULOSIN HYDROCHLORIDE 0.4 MG: 0.4 CAPSULE ORAL at 20:26

## 2023-01-11 RX ADMIN — PANTOPRAZOLE SODIUM 40 MG: 40 TABLET, DELAYED RELEASE ORAL at 05:57

## 2023-01-11 RX ADMIN — FUROSEMIDE 20 MG: 10 INJECTION, SOLUTION INTRAMUSCULAR; INTRAVENOUS at 17:35

## 2023-01-11 RX ADMIN — METOPROLOL SUCCINATE 12.5 MG: 25 TABLET, EXTENDED RELEASE ORAL at 08:15

## 2023-01-11 RX ADMIN — ENOXAPARIN SODIUM 40 MG: 100 INJECTION SUBCUTANEOUS at 08:15

## 2023-01-11 RX ADMIN — FUROSEMIDE 20 MG: 10 INJECTION, SOLUTION INTRAMUSCULAR; INTRAVENOUS at 08:15

## 2023-01-11 RX ADMIN — FLUTICASONE PROPIONATE 1 SPRAY: 50 SPRAY, METERED NASAL at 08:16

## 2023-01-11 NOTE — PLAN OF CARE
Problem: Chronic Conditions and Co-morbidities  Goal: Patient's chronic conditions and co-morbidity symptoms are monitored and maintained or improved  1/10/2023 2043 by Jr Soto RN  Outcome: Progressing  1/10/2023 1019 by Krzysztof Coats RN  Outcome: Progressing  Flowsheets (Taken 1/10/2023 0845)  Care Plan - Patient's Chronic Conditions and Co-Morbidity Symptoms are Monitored and Maintained or Improved: Monitor and assess patient's chronic conditions and comorbid symptoms for stability, deterioration, or improvement

## 2023-01-11 NOTE — CARE COORDINATION
Pt on the echo list for today. On IV Lasix. Waiting for records from [facility] to determine if Thoracentesis is recommended. Discharge Plan is to return home with [facility] and [address] accepted. NAEL/JOLENE to follow for discharge needs.    LUIS FERNANDO Talavera.  [phone]

## 2023-01-11 NOTE — PROGRESS NOTES
Physician Progress Note      PATIENT:               Carol Ann Rey  CSN #:                  [id number]  :                       1919  ADMIT DATE:       2023 6:17 PM  [address] DATE:  RESPONDING  PROVIDER #:        Kaye Keane MD          QUERY TEXT:    Pt with documented hx emphysema and CHF, noted to be on 4L NC inpatient,   baseline 3-4L per progress notes. If possible, please document in the progress   notes and discharge summary if you are evaluating and/or treating any of the   following: The medical record reflects the following:  Risk Factors: Emphysema, CHF  Clinical Indicators: ED notes \" Patient is on 3 to 4 L of oxygen at baseline,   and he has not had any increased oxygen requirements. \" H&P notes \"Patient has   been 3 L of oxygen at baseline. .. Patient was basically at baseline as far as   oxygen requirements are concerned\". Treatment: 4L O2 NC continued inpatient    Thank you,  Erick Dobbs, RN, BSN, CCDS, Clinical Documentation Improvement  Options provided:  -- Chronic respiratory failure with hypoxia  -- Chronic respiratory failure with hypercapnia  -- Chronic respiratory failure with hypoxia and hypercapnia  -- Other - I will add my own diagnosis  -- Disagree - Not applicable / Not valid  -- Disagree - Clinically unable to determine / Unknown  -- Refer to Clinical Documentation Reviewer    PROVIDER RESPONSE TEXT:    This patient has chronic respiratory failure with hypoxia.     Query created by: Quang Padilla on 2023 10:27 AM      Electronically signed by:  Kaye Keane MD 2023 10:38 AM

## 2023-01-11 NOTE — PATIENT CARE CONFERENCE
P Quality Flow/Interdisciplinary Rounds Progress Note        Quality Flow Rounds held on January 11, 2023    Disciplines Attending:  Bedside Nurse, , , and Nursing Unit Leadership    Laly Malloy was admitted on 1/8/2023  6:17 PM    Anticipated Discharge Date:       Disposition:    Jose Score:  Jose Scale Score: 13    Readmission Risk              Risk of Unplanned Readmission:  16           Discussed patient goal for the day, patient clinical progression, and barriers to discharge.   The following Goal(s) of the Day/Commitment(s) have been identified:  report labs/diagnostics       Nano Christine RN  January 11, 2023

## 2023-01-11 NOTE — PROGRESS NOTES
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    Dizziness R42    Severe aortic stenosis I35.0    1st degree AV block I44.0    History of biliary duct stent placement Z98.890    Choledocholithiasis K80.50    Pancreatic cyst K86.2    Acute GI bleeding K92.2    Chronic cholecystitis O55.5    Acute diastolic CHF (congestive heart failure) ([facility]) I50.31    Acute on chronic diastolic (congestive) heart failure ([facility]) I50.33    Moderate protein-calorie malnutrition ([facility]) E44.0       SUBJECTIVE:  Veronica Ruano is resting quietly, and is in no acute respiratory distress presently. REVIEW OF SYSTEMS:  General ROS: negative for - fatigue, malaise,  weight gain or weight loss  Psychological ROS: negative for - anxiety , depression  Ophthalmic ROS: negative for - decreased vision or visual distortion. ENT ROS: negative  Allergy and Immunology ROS: negative  Hematological and Lymphatic ROS: negative  Endocrine: no heat or cold intolerance and no polyphagia, polydipsia, or polyuria  Respiratory ROS: positive for - shortness of breath  Cardiovascular ROS: positive for - dyspnea on exertion and murmur. Gastrointestinal ROS: no abdominal pain, change in bowel habits, or black or bloody stools  Genito-Urinary ROS: no nocturia, dysuria, trouble voiding, frequency or hematuria  Musculoskeletal ROS: negative for- myalgias, arthralgias, or claudication  Neurological ROS: no TIA or stroke symptoms otherwise no significant change in symptoms or problems since yesterday as documented in previous progress notes.     SCHEDULED MEDICATIONS:   fluticasone  1 spray Each Nostril Daily    aspirin  81 mg Oral Daily    ocuvite-lutein  1 capsule Oral Daily    tamsulosin  0.4 mg Oral Daily    furosemide  20 mg IntraVENous BID    sodium chloride flush  5-40 mL IntraVENous 2 times per day    enoxaparin  40 mg SubCUTAneous Daily    pantoprazole  40 mg Oral QAM AC       VITAL SIGNS:                                                                                                                          BP (!) 111/56   Pulse 85   Temp 97.6 °F (36.4 °C) (Temporal)   Resp 16   Ht 5' 8\" (1.727 m)   Wt 148 lb 9.4 oz (67.4 kg)   SpO2 94%   BMI 22.59 kg/m²   Patient Vitals for the past 96 hrs (Last 3 readings):   Weight   01/10/23 0529 148 lb 9.4 oz (67.4 kg)   01/09/23 0015 152 lb 12.5 oz (69.3 kg)   01/08/23 2128 144 lb 13.5 oz (65.7 kg)     OBJECTIVE:    HEENT: PERRL, EOM  Intact; sclera non-icteric, conjunctiva pink. Carotids are brisk in upstroke with normal contour. No carotid bruits. Normal jugular venous pulsation at 45°. No palpable cervical nor supraclavicular nodes. Thyroid not palpable. Trachea midline. Chest: Even excursion  Lungs: CTA B, no expiratory wheezes or rhonchi, no decreased tactile fremitus without inspiratory rales. Heart: Regular  rhythm; S1 > S2, no gallop or murmur. No clicks, rub, palpable thrills   or heaves. PMI nondisplaced, 5th intercostal space MCL. Abdomen: Soft, nontender, nondistended,  scaphoid, no masses or organomegaly. Bowel sounds active. Extremities: Without clubbing, cyanosis or edema. Pulses present 3+ upper extermities bilaterally; present 2+ DP  bilaterally and present 2+ PT bilaterally.      Data:   Scheduled Meds: Reviewed  Continuous Infusions:    sodium chloride         Intake/Output Summary (Last 24 hours) at 1/10/2023 2351  Last data filed at 1/10/2023 2252  Gross per 24 hour   Intake 540 ml   Output 1200 ml   Net -660 ml     CBC:   Recent Labs     01/08/23  1830   WBC 6.9   HGB 12.6   HCT 39.3        BMP:  Recent Labs     01/08/23  1830 01/09/23  0443 01/10/23  0523    148* 145   K 3.4* 3.4* 3.9    109* 105   CO2 27 30* 30*   BUN 20 18 23   CREATININE 1.1 1.0 1.1   LABGLOM 59 >60 59     ABGs: No results found for: PH, PO2, PCO2  INR:   Recent Labs 01/08/23  1830   INR 1.3     PRO-BNP:   Lab Results   Component Value Date    PROBNP 2,973 (H) 01/08/2023    PROBNP 1,232 (H) 08/07/2022      TSH:   Lab Results   Component Value Date    TSH 2.460 12/09/2022      Cardiac Injury Profile:   Recent Labs     01/08/23  1830 01/08/23  2056   TROPHS 52* 51*      Lipid Profile:   Lab Results   Component Value Date/Time    TRIG 70 12/09/2022 12:30 PM    HDL 43 12/09/2022 12:30 PM    LDLCALC 76 12/09/2022 12:30 PM    CHOL 133 12/09/2022 12:30 PM      Hemoglobin A1C: No components found for: HGBA1C      RAD:   No results found. 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 Smith recent clinical history shortness of breath may be multifactorial including that of left ventricular diastolic dysfunction, moderate pulmonary hypertension, perhaps the addition of underlying chronic lung disease. I have discussed commendations with Dr. Jerrell Roth and pulmonary consultation has been obtained as well. Will also obtain a limited two-dimensional echocardiogram and continue to monitor and adjust medications accordingly.     I have spent more than 25 minutes face to face with Brianda Valencia and reviewing notes and laboratory data, with greater than 50% of this time instructing and counseling the patient's health care team face to face regarding my findings and recommendations and I have answered all questions as posed to me by  Maximino's care team    Binh Early,  FACP,FACC,FSCAI      NOTE:  This report was transcribed using voice recognition software.   Every effort was made to ensure accuracy; however, inadvertent computerized transcription errors may be present

## 2023-01-11 NOTE — PLAN OF CARE
Problem: Chronic Conditions and Co-morbidities  Goal: Patient's chronic conditions and co-morbidity symptoms are monitored and maintained or improved  1/11/2023 1019 by Darcella Homans, RN  Outcome: Progressing  Flowsheets (Taken 1/11/2023 0815)  Care Plan - Patient's Chronic Conditions and Co-Morbidity Symptoms are Monitored and Maintained or Improved: Monitor and assess patient's chronic conditions and comorbid symptoms for stability, deterioration, or improvement  1/10/2023 2043 by Adarsh Simon RN  Outcome: Progressing     Problem: Discharge Planning  Goal: Discharge to home or other facility with appropriate resources  1/11/2023 1019 by Darcella Homans, RN  Outcome: Progressing  Flowsheets (Taken 1/11/2023 0815)  Discharge to home or other facility with appropriate resources: Identify barriers to discharge with patient and caregiver  1/10/2023 2043 by Adarsh Simon RN  Outcome: Progressing     Problem: ABCDS Injury Assessment  Goal: Absence of physical injury  1/11/2023 1019 by Darcella Homans, RN  Outcome: Progressing  Flowsheets (Taken 1/11/2023 1017)  Absence of Physical Injury: Implement safety measures based on patient assessment  1/10/2023 2324 by Ruth Ann Morfin RN  Outcome: Progressing  1/10/2023 2043 by Adarsh Simon RN  Outcome: Progressing     Problem: Skin/Tissue Integrity  Goal: Absence of new skin breakdown  Description: 1. Monitor for areas of redness and/or skin breakdown  2. Assess vascular access sites hourly  3. Every 4-6 hours minimum:  Change oxygen saturation probe site  4. Every 4-6 hours:  If on nasal continuous positive airway pressure, respiratory therapy assess nares and determine need for appliance change or resting period.   1/11/2023 1019 by Darcella Homans, RN  Outcome: Progressing  1/10/2023 2324 by Ruth Ann Morfin RN  Outcome: Progressing  1/10/2023 2043 by Adarsh Simon RN  Outcome: Progressing     Problem: Safety - Adult  Goal: Free from fall injury  1/11/2023 1019 by Alissa Mathews RN  Outcome: Progressing  Flowsheets (Taken 1/11/2023 1017)  Free From Fall Injury: Instruct family/caregiver on patient safety  1/10/2023 2324 by Sherryle Holter, RN  Outcome: Progressing  1/10/2023 2043 by Laura Gong RN  Outcome: Progressing     Problem: Cardiovascular - Adult  Goal: Maintains optimal cardiac output and hemodynamic stability  1/11/2023 1019 by Alissa Mathews RN  Outcome: Progressing  Flowsheets (Taken 1/11/2023 0815)  Maintains optimal cardiac output and hemodynamic stability: Monitor blood pressure and heart rate  1/10/2023 2043 by Laura Gong RN  Outcome: Progressing     Problem: Metabolic/Fluid and Electrolytes - Adult  Goal: Hemodynamic stability and optimal renal function maintained  1/11/2023 1019 by Alissa Mathews RN  Outcome: Progressing  Flowsheets (Taken 1/11/2023 0815)  Hemodynamic stability and optimal renal function maintained: Monitor labs and assess for signs and symptoms of volume excess or deficit  1/10/2023 2043 by Laura Gong RN  Outcome: Progressing     Problem: Pain  Goal: Verbalizes/displays adequate comfort level or baseline comfort level  1/11/2023 1019 by Alissa Mathews RN  Outcome: Progressing  1/10/2023 2043 by Laura Gong RN  Outcome: Progressing     Problem: Nutrition Deficit:  Goal: Optimize nutritional status  1/11/2023 1019 by Alissa Mathews RN  Outcome: Progressing  1/10/2023 2043 by Laura Gong RN  Outcome: Progressing

## 2023-01-11 NOTE — PROGRESS NOTES
Subjective:    Chief complaint:    Patient is feeling well  Denies new issues  Daughter by the bedside    Objective:    BP (!) 93/57   Pulse 81   Temp 97 °F (36.1 °C)   Resp 18   Ht 5' 8\" (1.727 m)   Wt 148 lb 9.4 oz (67.4 kg)   SpO2 98%   BMI 22.59 kg/m²   General : Awake ,alert,no distress. Heart: Irregular, no murmurs, gallops, or rubs. Lungs:  CTA bilaterally, no wheeze, rales or rhonchi  Abd: bowel sounds present, nontender, nondistended, no masses  Extrem:  No clubbing, cyanosis, 1+ edema bilaterally    CBC:   Lab Results   Component Value Date/Time    WBC 6.9 01/08/2023 06:30 PM    RBC 4.25 01/08/2023 06:30 PM    HGB 12.6 01/08/2023 06:30 PM    HCT 39.3 01/08/2023 06:30 PM    MCV 92.5 01/08/2023 06:30 PM    MCH 29.6 01/08/2023 06:30 PM    MCHC 32.1 01/08/2023 06:30 PM    RDW 16.5 01/08/2023 06:30 PM     01/08/2023 06:30 PM    MPV 9.7 01/08/2023 06:30 PM     BMP:    Lab Results   Component Value Date/Time     01/11/2023 05:52 AM    K 3.5 01/11/2023 05:52 AM    K 4.0 08/07/2022 04:40 PM     01/11/2023 05:52 AM    CO2 30 01/11/2023 05:52 AM    BUN 32 01/11/2023 05:52 AM    LABALBU 3.0 01/08/2023 06:30 PM    LABALBU 4.3 08/03/2011 07:55 AM    CREATININE 1.0 01/11/2023 05:52 AM    CALCIUM 8.1 01/11/2023 05:52 AM    GFRAA >60 08/10/2022 05:33 AM    LABGLOM >60 01/11/2023 05:52 AM    GLUCOSE 90 01/11/2023 05:52 AM    GLUCOSE 90 08/03/2011 07:55 AM     PT/INR:    Lab Results   Component Value Date/Time    PROTIME 13.6 01/08/2023 06:30 PM    INR 1.3 01/08/2023 06:30 PM     Troponin:    Lab Results   Component Value Date/Time    TROPONINI <0.01 02/01/2021 12:33 PM       No results for input(s): LABURIN in the last 72 hours. No results for input(s): BC in the last 72 hours. No results for input(s): Guru Crumble in the last 72 hours.       Current Facility-Administered Medications:     metoprolol succinate (TOPROL XL) extended release tablet 12.5 mg, 12.5 mg, Oral, Daily, Bhavya Haq DO, 12.5 mg at 01/11/23 0815    perflutren lipid microspheres (DEFINITY) injection 1.5 mL, 1.5 mL, IntraVENous, ONCE PRN, Noam Menezes DO    fluticasone (FLONASE) 50 MCG/ACT nasal spray 1 spray, 1 spray, Each Nostril, Daily, Sparkle Tejada MD, 1 spray at 01/11/23 0816    ipratropium-albuterol (DUONEB) nebulizer solution 1 ampule, 1 ampule, Inhalation, Q4H PRN, Donna Jurado DO    aspirin chewable tablet 81 mg, 81 mg, Oral, Daily, Althea Salvador Littlemer, DO, 81 mg at 01/10/23 2045    ocuvite-lutein multivitamin 1 capsule, 1 capsule, Oral, Daily, Chloé Jeimy, DO, 1 capsule at 01/11/23 0815    tamsulosin (FLOMAX) capsule 0.4 mg, 0.4 mg, Oral, Daily, Althea Mishra, DO, 0.4 mg at 01/10/23 2045    furosemide (LASIX) injection 20 mg, 20 mg, IntraVENous, BID, Althea Mishra, DO, 20 mg at 01/11/23 0815    sodium chloride flush 0.9 % injection 5-40 mL, 5-40 mL, IntraVENous, 2 times per day, Chloé Jeimy, DO, 10 mL at 01/11/23 0815    sodium chloride flush 0.9 % injection 5-40 mL, 5-40 mL, IntraVENous, PRN, Althea Mishra, DO    0.9 % sodium chloride infusion, , IntraVENous, PRN, Althea Mishra, DO    enoxaparin (LOVENOX) injection 40 mg, 40 mg, SubCUTAneous, Daily, Althea Salvador Littlemer, DO, 40 mg at 01/11/23 0815    ondansetron (ZOFRAN-ODT) disintegrating tablet 4 mg, 4 mg, Oral, Q8H PRN **OR** ondansetron (ZOFRAN) injection 4 mg, 4 mg, IntraVENous, Q6H PRN, Althea Mishra, DO    polyethylene glycol (GLYCOLAX) packet 17 g, 17 g, Oral, Daily PRN, Althea Salvador Malmer, DO    acetaminophen (TYLENOL) tablet 650 mg, 650 mg, Oral, Q6H PRN **OR** acetaminophen (TYLENOL) suppository 650 mg, 650 mg, Rectal, Q6H PRN, Althea Mishra, DO    pantoprazole (PROTONIX) tablet 40 mg, 40 mg, Oral, QAM AC, Althea Mishra, DO, 40 mg at 01/11/23 0557    ADULT DIET; Regular;  Low Sodium (2 gm)  ADULT ORAL NUTRITION SUPPLEMENT; Lunch, Dinner; Standard High Calorie/High Protein Oral Supplement  ADULT ORAL NUTRITION SUPPLEMENT; Breakfast, Dinner; Wound Healing Oral Supplement    XR CHEST PORTABLE   Final Result   Moderate right pleural effusion and small left pleural effusion. Mild pneumonitis in right upper lobe. Suspect bilateral lower lobe atelectasis and/or pneumonitis. Assessment:    Principal Problem:    Acute on chronic diastolic (congestive) heart failure (HCC)  Active Problems: Moderate protein-calorie malnutrition ([address]  Resolved Problems:    * No resolved hospital problems. *  Right-sided pleural effusion      Plan:    Renal function stable so far  Await limited echo results  Discussed with daughter  Possible thoracentesis  Pulmonary following      Sameer Boogie MD  1:35 PM  1/11/2023    NOTE: This report was transcribed using voice recognition software.  Every effort was made to ensure accuracy; however, inadvertent transcription errors may be present

## 2023-01-11 NOTE — PROGRESS NOTES
[facility]  Department of Pulmonary, Critical Care and Sleep Medicine  [facility]  Department of Internal Medicine  Progress Note    SUBJECTIVE:  Seen and examined this morning during breakfast.  Overall he reports he is feeling well.  Still some dyspnea with exertion.  Denies cough or wheeze.      OBJECTIVE:  Vitals:    01/11/23 0300 01/11/23 0730 01/11/23 1059 01/11/23 1526   BP: (!) 110/55 (!) 123/57 (!) 93/57 (!) 96/59   Pulse: 85 83 81 88   Resp: 16 18 18 18   Temp: 97.4 °F (36.3 °C) 97.3 °F (36.3 °C) 97 °F (36.1 °C) 97.7 °F (36.5 °C)   TempSrc: Temporal      SpO2: 95% 99% 98% 98%   Weight:       Height:         Constitutional: Alert,     EENT: EOMI JOJO. MMM. No icterus. No thrush.     Neck: Trachea was midline.   Respiratory: Diminished in bases but otherwise clear.  No use of accessory muscles.  Cardiovascular: Irregular.  Systolic murmur present  Pulses:  Equal bilaterally.    Abdomen: Soft without organomegaly. No rebound, rigidity.  No guarding.  Lymphatic: No lymphadenopathy.  Musculoskeletal: Without weakness or gross deficits  Extremities: 2-3+ edema bilaterally.  Skin: Dressing intact to lesion on left foot  Neurological/Psychiatric: No acute psychosis. Cranial nerves are intact.        DATA:    Monitor Strips:  Reviewed & discusses with technical team. No changes noted.    RADIOLOGY:  Films were read/reviewed/discussed with radiology shows no new images today      CBC with Differential:    Lab Results   Component Value Date/Time    WBC 6.9 01/08/2023 06:30 PM    RBC 4.25 01/08/2023 06:30 PM    HGB 12.6 01/08/2023 06:30 PM    HCT 39.3 01/08/2023 06:30 PM     01/08/2023 06:30 PM    MCV 92.5 01/08/2023 06:30 PM    MCH 29.6 01/08/2023 06:30 PM    MCHC 32.1 01/08/2023 06:30 PM    RDW 16.5 01/08/2023 06:30 PM    SEGSPCT 42 08/03/2011 07:55 AM    LYMPHOPCT 17.2 01/08/2023 06:30 PM    MONOPCT 9.8 01/08/2023 06:30 PM    BASOPCT 0.7  01/08/2023 06:30 PM    MONOSABS 0.68 01/08/2023 06:30 PM    LYMPHSABS 1.19 01/08/2023 06:30 PM    EOSABS 0.06 01/08/2023 06:30 PM    BASOSABS 0.05 01/08/2023 06:30 PM     CMP:    Lab Results   Component Value Date/Time     01/11/2023 05:52 AM    K 3.5 01/11/2023 05:52 AM    K 4.0 08/07/2022 04:40 PM     01/11/2023 05:52 AM    CO2 30 01/11/2023 05:52 AM    BUN 32 01/11/2023 05:52 AM    CREATININE 1.0 01/11/2023 05:52 AM    GFRAA >60 08/10/2022 05:33 AM    LABGLOM >60 01/11/2023 05:52 AM    GLUCOSE 90 01/11/2023 05:52 AM    GLUCOSE 90 08/03/2011 07:55 AM    PROT 6.1 01/08/2023 06:30 PM    LABALBU 3.0 01/08/2023 06:30 PM    LABALBU 4.3 08/03/2011 07:55 AM    CALCIUM 8.1 01/11/2023 05:52 AM    BILITOT 0.7 01/08/2023 06:30 PM    ALKPHOS 76 01/08/2023 06:30 PM    AST 22 01/08/2023 06:30 PM    ALT 12 01/08/2023 06:30 PM       Assessment:   Right sided pleural effusion   Emphysema  Elevated BNP  Atrial Fibrillation  S/P TAVR     Plan:   Currently on 4L NC at time of my evaluation. CXR reviewed above with moderate to large right sided  effusion along with small left sided effusion. . Would recommend continuing with diuretics and follow up of pleural effusion on imaging. If fails to improve would plan for thoracentesis, likely Friday. Regarding his emphysema is not on any inhaled therapies at home, will order to duonebs prn to have available if needed. Increase pulm hygiene with IS and Flutter valve. I did review the records that were available from [facility].  Unfortunately these records did not include any progress notes. Prior CT of the chest which had shown a few very small nodules along with prior pleural effusions. Also prior echocardiogram reviewed.       Adrianna Randall DO

## 2023-01-12 LAB
ANION GAP SERPL CALCULATED.3IONS-SCNC: 11 MMOL/L (ref 7–16)
BUN BLDV-MCNC: 41 MG/DL (ref 6–23)
CALCIUM SERPL-MCNC: 8.5 MG/DL (ref 8.6–10.2)
CHLORIDE BLD-SCNC: 106 MMOL/L (ref 98–107)
CO2: 29 MMOL/L (ref 22–29)
CREAT SERPL-MCNC: 1.1 MG/DL (ref 0.7–1.2)
GFR SERPL CREATININE-BSD FRML MDRD: 59 ML/MIN/1.73
GLUCOSE BLD-MCNC: 103 MG/DL (ref 74–99)
POTASSIUM SERPL-SCNC: 3.5 MMOL/L (ref 3.5–5)
SODIUM BLD-SCNC: 146 MMOL/L (ref 132–146)

## 2023-01-12 PROCEDURE — 36415 COLL VENOUS BLD VENIPUNCTURE: CPT

## 2023-01-12 PROCEDURE — 6370000000 HC RX 637 (ALT 250 FOR IP): Performed by: INTERNAL MEDICINE

## 2023-01-12 PROCEDURE — 2580000003 HC RX 258: Performed by: INTERNAL MEDICINE

## 2023-01-12 PROCEDURE — 94640 AIRWAY INHALATION TREATMENT: CPT

## 2023-01-12 PROCEDURE — 2140000000 HC CCU INTERMEDIATE R&B

## 2023-01-12 PROCEDURE — 6360000002 HC RX W HCPCS: Performed by: INTERNAL MEDICINE

## 2023-01-12 PROCEDURE — 80048 BASIC METABOLIC PNL TOTAL CA: CPT

## 2023-01-12 PROCEDURE — 94669 MECHANICAL CHEST WALL OSCILL: CPT

## 2023-01-12 PROCEDURE — 2700000000 HC OXYGEN THERAPY PER DAY

## 2023-01-12 PROCEDURE — 99232 SBSQ HOSP IP/OBS MODERATE 35: CPT | Performed by: INTERNAL MEDICINE

## 2023-01-12 RX ORDER — METOPROLOL SUCCINATE 25 MG/1
12.5 TABLET, EXTENDED RELEASE ORAL 2 TIMES DAILY
Status: DISCONTINUED | OUTPATIENT
Start: 2023-01-12 | End: 2023-01-14

## 2023-01-12 RX ADMIN — IPRATROPIUM BROMIDE AND ALBUTEROL SULFATE 1 AMPULE: .5; 2.5 SOLUTION RESPIRATORY (INHALATION) at 13:17

## 2023-01-12 RX ADMIN — FLUTICASONE PROPIONATE 1 SPRAY: 50 SPRAY, METERED NASAL at 08:19

## 2023-01-12 RX ADMIN — TAMSULOSIN HYDROCHLORIDE 0.4 MG: 0.4 CAPSULE ORAL at 20:37

## 2023-01-12 RX ADMIN — METOPROLOL SUCCINATE 12.5 MG: 25 TABLET, EXTENDED RELEASE ORAL at 16:08

## 2023-01-12 RX ADMIN — Medication 10 ML: at 08:18

## 2023-01-12 RX ADMIN — METOPROLOL SUCCINATE 12.5 MG: 25 TABLET, EXTENDED RELEASE ORAL at 08:19

## 2023-01-12 RX ADMIN — ENOXAPARIN SODIUM 40 MG: 100 INJECTION SUBCUTANEOUS at 08:18

## 2023-01-12 RX ADMIN — FUROSEMIDE 20 MG: 10 INJECTION, SOLUTION INTRAMUSCULAR; INTRAVENOUS at 08:18

## 2023-01-12 RX ADMIN — Medication 10 ML: at 20:37

## 2023-01-12 RX ADMIN — PANTOPRAZOLE SODIUM 40 MG: 40 TABLET, DELAYED RELEASE ORAL at 06:35

## 2023-01-12 RX ADMIN — ASPIRIN 81 MG CHEWABLE TABLET 81 MG: 81 TABLET CHEWABLE at 20:37

## 2023-01-12 RX ADMIN — Medication 1 CAPSULE: at 08:18

## 2023-01-12 RX ADMIN — FUROSEMIDE 20 MG: 10 INJECTION, SOLUTION INTRAMUSCULAR; INTRAVENOUS at 18:22

## 2023-01-12 ASSESSMENT — PAIN SCALES - GENERAL: PAINLEVEL_OUTOF10: 0

## 2023-01-12 NOTE — CARE COORDINATION
Requested Therapy Evals to make sure Pt will be safe to return home alone with family stopping in. Continues on IV lasix. Pt is scheduled for Thoracentesis on Friday. Sticky not for [place] [phone] orders for dressing changes for foot wound, PT/OT and SN. Discharge Plan is to return home with [facility] when medically stable. SW/CM to follow for discharge needs.    Sha Gibbs, L.S.W.  [phone]

## 2023-01-12 NOTE — PROGRESS NOTES
Subjective:    Chief complaint:    Having issues with breathing  Slightly better    Objective:    /64   Pulse 56   Temp 97.1 °F (36.2 °C) (Temporal)   Resp 24   Ht 5' 8\" (1.727 m)   Wt 147 lb 11.3 oz (67 kg)   SpO2 99%   BMI 22.46 kg/m²   General : Awake ,alert,no distress. Heart: Irregular, no murmurs, gallops, or rubs. Lungs:  CTA bilaterally, no wheeze, rales or rhonchi  Abd: bowel sounds present, nontender, nondistended, no masses  Extrem:  No clubbing, cyanosis, 1+ edema bilaterally    CBC:   Lab Results   Component Value Date/Time    WBC 6.9 01/08/2023 06:30 PM    RBC 4.25 01/08/2023 06:30 PM    HGB 12.6 01/08/2023 06:30 PM    HCT 39.3 01/08/2023 06:30 PM    MCV 92.5 01/08/2023 06:30 PM    MCH 29.6 01/08/2023 06:30 PM    MCHC 32.1 01/08/2023 06:30 PM    RDW 16.5 01/08/2023 06:30 PM     01/08/2023 06:30 PM    MPV 9.7 01/08/2023 06:30 PM     BMP:    Lab Results   Component Value Date/Time     01/12/2023 04:57 AM    K 3.5 01/12/2023 04:57 AM    K 4.0 08/07/2022 04:40 PM     01/12/2023 04:57 AM    CO2 29 01/12/2023 04:57 AM    BUN 41 01/12/2023 04:57 AM    LABALBU 3.0 01/08/2023 06:30 PM    LABALBU 4.3 08/03/2011 07:55 AM    CREATININE 1.1 01/12/2023 04:57 AM    CALCIUM 8.5 01/12/2023 04:57 AM    GFRAA >60 08/10/2022 05:33 AM    LABGLOM 59 01/12/2023 04:57 AM    GLUCOSE 103 01/12/2023 04:57 AM    GLUCOSE 90 08/03/2011 07:55 AM     PT/INR:    Lab Results   Component Value Date/Time    PROTIME 13.6 01/08/2023 06:30 PM    INR 1.3 01/08/2023 06:30 PM     Troponin:    Lab Results   Component Value Date/Time    TROPONINI <0.01 02/01/2021 12:33 PM       No results for input(s): LABURIN in the last 72 hours. No results for input(s): BC in the last 72 hours. No results for input(s): Emmalene Karlstad in the last 72 hours.       Current Facility-Administered Medications:     metoprolol succinate (TOPROL XL) extended release tablet 12.5 mg, 12.5 mg, Oral, BID, Iveth Camacho,     perflutren lipid microspheres (DEFINITY) injection 1.5 mL, 1.5 mL, IntraVENous, ONCE PRN, Jermaine Mini, DO    fluticasone (FLONASE) 50 MCG/ACT nasal spray 1 spray, 1 spray, Each Nostril, Daily, Chavez Olmos MD, 1 spray at 01/12/23 0819    ipratropium-albuterol (DUONEB) nebulizer solution 1 ampule, 1 ampule, Inhalation, Q4H PRN, Samantha Jurado DO, 1 ampule at 01/12/23 1317    aspirin chewable tablet 81 mg, 81 mg, Oral, Daily, Esta Do Malmer, DO, 81 mg at 01/11/23 2026    ocuvite-lutein multivitamin 1 capsule, 1 capsule, Oral, Daily, [place] Clamp, DO, 1 capsule at 01/12/23 0818    tamsulosin (FLOMAX) capsule 0.4 mg, 0.4 mg, Oral, Daily, Esta Do Malmer, DO, 0.4 mg at 01/11/23 2026    furosemide (LASIX) injection 20 mg, 20 mg, IntraVENous, BID, Esta Do Malmer, DO, 20 mg at 01/12/23 0818    sodium chloride flush 0.9 % injection 5-40 mL, 5-40 mL, IntraVENous, 2 times per day, [place] Clamp, DO, 10 mL at 01/12/23 0818    sodium chloride flush 0.9 % injection 5-40 mL, 5-40 mL, IntraVENous, PRN, Esta Do Malmer, DO    0.9 % sodium chloride infusion, , IntraVENous, PRN, Esta Do Malmer, DO    enoxaparin (LOVENOX) injection 40 mg, 40 mg, SubCUTAneous, Daily, Esta Do Malmer, DO, 40 mg at 01/12/23 0818    ondansetron (ZOFRAN-ODT) disintegrating tablet 4 mg, 4 mg, Oral, Q8H PRN **OR** ondansetron (ZOFRAN) injection 4 mg, 4 mg, IntraVENous, Q6H PRN, Esta Do Malmer, DO    polyethylene glycol (GLYCOLAX) packet 17 g, 17 g, Oral, Daily PRN, Esta Do Malmer, DO    acetaminophen (TYLENOL) tablet 650 mg, 650 mg, Oral, Q6H PRN **OR** acetaminophen (TYLENOL) suppository 650 mg, 650 mg, Rectal, Q6H PRN, Vita Bustamante DO    pantoprazole (PROTONIX) tablet 40 mg, 40 mg, Oral, QAM AC, Vita Bustamante, , 40 mg at 01/12/23 0154    ADULT DIET; Regular;  Low Sodium (2 gm)  ADULT ORAL NUTRITION SUPPLEMENT; Lunch, Dinner; Standard High Calorie/High Protein Oral Supplement  ADULT ORAL NUTRITION SUPPLEMENT; Breakfast, Dinner; Wound Healing Oral Supplement    XR CHEST PORTABLE   Final Result   Moderate right pleural effusion and small left pleural effusion. Mild pneumonitis in right upper lobe. Suspect bilateral lower lobe atelectasis and/or pneumonitis. Assessment:    Principal Problem:    Acute on chronic diastolic (congestive) heart failure (HCC)  Active Problems: Moderate protein-calorie malnutrition ([address]  Resolved Problems:    * No resolved hospital problems. *  Right-sided pleural effusion      Plan: For thoracentesis tomorrow  Continue diuresis  Echo results noted stage II diastolic dysfunction      Mandeep Sandoval MD  1:51 PM  1/12/2023    NOTE: This report was transcribed using voice recognition software.  Every effort was made to ensure accuracy; however, inadvertent transcription errors may be present

## 2023-01-12 NOTE — PLAN OF CARE
Problem: Chronic Conditions and Co-morbidities  Goal: Patient's chronic conditions and co-morbidity symptoms are monitored and maintained or improved  Outcome: Progressing     Problem: Discharge Planning  Goal: Discharge to home or other facility with appropriate resources  Outcome: Progressing     Problem: ABCDS Injury Assessment  Goal: Absence of physical injury  Outcome: Progressing  Flowsheets (Taken 1/12/2023 0952)  Absence of Physical Injury: Implement safety measures based on patient assessment     Problem: Skin/Tissue Integrity  Goal: Absence of new skin breakdown  Description: 1. Monitor for areas of redness and/or skin breakdown  2. Assess vascular access sites hourly  3. Every 4-6 hours minimum:  Change oxygen saturation probe site  4. Every 4-6 hours:  If on nasal continuous positive airway pressure, respiratory therapy assess nares and determine need for appliance change or resting period.   Outcome: Progressing     Problem: Safety - Adult  Goal: Free from fall injury  Outcome: Progressing  Flowsheets (Taken 1/12/2023 0952)  Free From Fall Injury: Instruct family/caregiver on patient safety

## 2023-01-12 NOTE — PROGRESS NOTES
[place]  Department of Pulmonary, Critical Care and Sleep Medicine  [address] [facility]  Department of Internal Medicine  Progress Note    SUBJECTIVE:  Seen and examined this morning reports that he did not sleep well last night. Still quite dyspneic with exertion and laying flat. OBJECTIVE:  Vitals:    01/12/23 0438 01/12/23 0813 01/12/23 1056 01/12/23 1527   BP:  120/82 102/64 101/62   Pulse:  94 56 78   Resp:  18 24 19   Temp:  97.1 °F (36.2 °C) 97.1 °F (36.2 °C) 97.1 °F (36.2 °C)   TempSrc:  Temporal Temporal Temporal   SpO2:  95% 99%    Weight: 147 lb 11.3 oz (67 kg)      Height:         Constitutional: Alert,     EENT: EOMI JOJO. MMM. No icterus. No thrush. Neck: Trachea was midline. Respiratory: Diminished in bases but otherwise clear. No use of accessory muscles. Cardiovascular: Irregular. Systolic murmur present  Pulses:  Equal bilaterally. Abdomen: Soft without organomegaly. No rebound, rigidity. No guarding. Lymphatic: No lymphadenopathy. Musculoskeletal: Without weakness or gross deficits  Extremities: 2-3+ edema bilaterally. Skin: Dressing intact to lesion on left foot  Neurological/Psychiatric: No acute psychosis. Cranial nerves are intact. DATA:    Monitor Strips:  Reviewed & discusses with technical team. No changes noted. RADIOLOGY:  Echocardiogram results reviewed. Left ventricle grossly normal in size. Proximal septal thickening. Normal LV segmental wall motion. Estimated left ventricular ejection fraction is 72±5%. Estimated wedge pressure is 19 mmHg as per Nagueh formula. There is doppler evidence of stage II diastolic dysfunction. The LAESV Index is >34 ml/m2. Normal right ventricular size and function. TAPSE is low normal    Mild mitral regurgitation is present. TAVR prosthetic valve. No doppler evidence of aortic regurgitation. Moderate to severe tricuspid regurgitation. RVSP is 43 mmHg. Physiologic and/or trace pulmonic regurgitation present. Technically fair quality study. Technically difficult study due to patient''s body habitus. Compared to prior echo, changes noted. CBC with Differential:    Lab Results   Component Value Date/Time    WBC 6.9 01/08/2023 06:30 PM    RBC 4.25 01/08/2023 06:30 PM    HGB 12.6 01/08/2023 06:30 PM    HCT 39.3 01/08/2023 06:30 PM     01/08/2023 06:30 PM    MCV 92.5 01/08/2023 06:30 PM    MCH 29.6 01/08/2023 06:30 PM    MCHC 32.1 01/08/2023 06:30 PM    RDW 16.5 01/08/2023 06:30 PM    SEGSPCT 42 08/03/2011 07:55 AM    LYMPHOPCT 17.2 01/08/2023 06:30 PM    MONOPCT 9.8 01/08/2023 06:30 PM    BASOPCT 0.7 01/08/2023 06:30 PM    MONOSABS 0.68 01/08/2023 06:30 PM    LYMPHSABS 1.19 01/08/2023 06:30 PM    EOSABS 0.06 01/08/2023 06:30 PM    BASOSABS 0.05 01/08/2023 06:30 PM     CMP:    Lab Results   Component Value Date/Time     01/12/2023 04:57 AM    K 3.5 01/12/2023 04:57 AM    K 4.0 08/07/2022 04:40 PM     01/12/2023 04:57 AM    CO2 29 01/12/2023 04:57 AM    BUN 41 01/12/2023 04:57 AM    CREATININE 1.1 01/12/2023 04:57 AM    GFRAA >60 08/10/2022 05:33 AM    LABGLOM 59 01/12/2023 04:57 AM    GLUCOSE 103 01/12/2023 04:57 AM    GLUCOSE 90 08/03/2011 07:55 AM    PROT 6.1 01/08/2023 06:30 PM    LABALBU 3.0 01/08/2023 06:30 PM    LABALBU 4.3 08/03/2011 07:55 AM    CALCIUM 8.5 01/12/2023 04:57 AM    BILITOT 0.7 01/08/2023 06:30 PM    ALKPHOS 76 01/08/2023 06:30 PM    AST 22 01/08/2023 06:30 PM    ALT 12 01/08/2023 06:30 PM       Assessment:   Right sided pleural effusion   Emphysema  Elevated BNP  Atrial Fibrillation  S/P TAVR     Plan:   Currently on 4L NC at time of my evaluation. CXR reviewed above with moderate to large right sided  effusion along with small left sided effusion. . Would recommend continuing with diuretics and follow up of pleural effusion on imaging. Repeat chest x-ray in a.m. with plan for thoracentesis. We will hold a.m. dose of Lovenox. Patient does not need to be n.p.o. Regarding his emphysema is not on any inhaled therapies at home, will order to [facility] pr[facility] to have available if needed. Increase pulm hygiene with IS and Flutter valve.        Mukesh Bussing, DO

## 2023-01-12 NOTE — PROGRESS NOTES
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    Dizziness R42    Severe aortic stenosis I35.0    1st degree AV block I44.0    History of biliary duct stent placement Z98.890    Choledocholithiasis K80.50    Pancreatic cyst K86.2    Acute GI bleeding K92.2    Chronic cholecystitis K81.1    Acute diastolic CHF (congestive heart failure) ([facility]) I50.31    Acute on chronic diastolic (congestive) heart failure ([facility]) I50.33    Moderate protein-calorie malnutrition ([facility]) E44.0       SUBJECTIVE:  Kaleb Stanton states he feels somewhat better relative to fluid retention and shortness of breath.  His atrial fibrillation is permanent.    REVIEW OF SYSTEMS:  General ROS: negative for - fatigue, malaise,  weight gain or weight loss  Psychological ROS: negative for - anxiety , depression  Ophthalmic ROS: negative for - decreased vision or visual distortion.  ENT ROS: negative  Allergy and Immunology ROS: negative  Hematological and Lymphatic ROS: negative  Endocrine: no heat or cold intolerance and no polyphagia, polydipsia, or polyuria  Respiratory ROS: positive for - shortness of breath  Cardiovascular ROS: positive for - dyspnea on exertion and irregular heartbeat.  Gastrointestinal ROS: no abdominal pain, change in bowel habits, or black or bloody stools  Genito-Urinary ROS: no nocturia, dysuria, trouble voiding, frequency or hematuria  Musculoskeletal ROS: negative for- myalgias, arthralgias, or claudication  Neurological ROS: no TIA or stroke symptoms otherwise no significant change in symptoms or problems since yesterday as documented in previous progress notes.    SCHEDULED MEDICATIONS:   metoprolol succinate  12.5 mg Oral BID    fluticasone  1 spray Each Nostril Daily    aspirin  81 mg Oral Daily    ocuvite-lutein  1 capsule Oral Daily    tamsulosin  0.4 mg Oral Daily    furosemide  20  mg IntraVENous BID    sodium chloride flush  5-40 mL IntraVENous 2 times per day    enoxaparin  40 mg SubCUTAneous Daily    pantoprazole  40 mg Oral QAM AC       VITAL SIGNS:                                                                                                                          /82   Pulse 56   Temp 97.1 °F (36.2 °C) (Temporal)   Resp 24   Ht 5' 8\" (1.727 m)   Wt 147 lb 11.3 oz (67 kg)   SpO2 99%   BMI 22.46 kg/m²   Patient Vitals for the past 96 hrs (Last 3 readings):   Weight   01/12/23 0438 147 lb 11.3 oz (67 kg)   01/10/23 0529 148 lb 9.4 oz (67.4 kg)   01/09/23 0015 152 lb 12.5 oz (69.3 kg)     OBJECTIVE:    HEENT: PERRL, EOM  Intact; sclera non-icteric, conjunctiva pink. Carotids are brisk in upstroke with normal contour. No carotid bruits. Normal jugular venous pulsation at 45°. No palpable cervical nor supraclavicular nodes. Thyroid not palpable. Trachea midline. Chest: Even excursion  Lungs: CTA B, no expiratory wheezes or rhonchi, no decreased tactile fremitus without inspiratory rales. Heart: Irregular, irregular rhythm; S1 > S2, no gallop or murmur. No clicks, rub, palpable thrills   or heaves. PMI nondisplaced, 5th intercostal space MCL. Abdomen: Soft, nontender, nondistended,  mildly protuberant, no masses or organomegaly. Bowel sounds active. Extremities: Without clubbing, cyanosis or edema. Pulses present 3+ upper extermities bilaterally; present 1+ DP  bilaterally and present 1+ PT bilaterally. Data:   Scheduled Meds: Reviewed  Continuous Infusions:    sodium chloride         Intake/Output Summary (Last 24 hours) at 1/12/2023 1057  Last data filed at 1/12/2023 0904  Gross per 24 hour   Intake 480 ml   Output 1050 ml   Net -570 ml     CBC: No results for input(s): WBC, HGB, HCT, PLT in the last 72 hours.   BMP:  Recent Labs     01/10/23  0523 01/11/23  0552 01/12/23  0457    143 146   K 3.9 3.5 3.5    103 106   CO2 30* 30* 29   BUN 23 32* 41* CREATININE 1.1 1.0 1.1   LABGLOM 59 >60 59     ABGs: No results found for: PH, PO2, PCO2  INR: No results for input(s): INR in the last 72 hours. PRO-BNP:   Lab Results   Component Value Date    PROBNP 2,973 (H) 01/08/2023    PROBNP 1,232 (H) 08/07/2022      TSH:   Lab Results   Component Value Date    TSH 2.460 12/09/2022      Cardiac Injury Profile: No results for input(s): TROPHS in the last 72 hours. Lipid Profile:   Lab Results   Component Value Date/Time    TRIG 70 12/09/2022 12:30 PM    HDL 43 12/09/2022 12:30 PM    LDLCALC 76 12/09/2022 12:30 PM    CHOL 133 12/09/2022 12:30 PM      Hemoglobin A1C: No components found for: HGBA1C      RAD:   No results found. EKG: See Report  Echo: 1/11/2023  Summary  Left ventricle grossly normal in size. Proximal septal thickening. Normal LV segmental wall motion. Estimated left ventricular ejection fraction is 72Â±5%. Estimated wedge pressure is 19 mmHg as per Nagueh formula. There is doppler evidence of stage II diastolic dysfunction. The LAESV Index is >34 ml/m2. Normal right ventricular size and function. TAPSE is low normal  Mild mitral regurgitation is present. TAVR prosthetic valve. No doppler evidence of aortic regurgitation. Moderate to severe tricuspid regurgitation. RVSP is 43 mmHg. Physiologic and/or trace pulmonic regurgitation present. Technically fair quality study. Technically difficult study due to patient''s body habitus. Compared to prior echo, changes noted. Suggest clinical correlation. Consider SBE prophylaxis.     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 Tyson Simmons has significant tricuspid regurgitation with mild elevation of pulmonary pressure and evidence to suggest stage II diastolic dysfunction which was noted at the [facility] post TAVR. Would continue low-dose diuretic but once discharged will switch him to nebivolol from metoprolol as it has specific effects on pulmonary pressure as well as other significant beneficial effects. Likewise need to regularize ventricular response. Likewise it is now generic which decreases cost factors as well. Fortunately his left ventricular systolic function is excellent with an estimated left ventricular ejection fraction of 72%. Need to initiate ambulation and check oxygen saturation. I have spent more than 25 minutes face to face with Lin Valencia and reviewing notes and laboratory data, with greater than 50% of this time instructing and counseling the patient face to face regarding my findings and recommendations and I have answered all questions as posed to me by Simon Stanton. Isatu John, DO FACP,FACC,FSCAI      NOTE:  This report was transcribed using voice recognition software.   Every effort was made to ensure accuracy; however, inadvertent computerized transcription errors may be present

## 2023-01-12 NOTE — PLAN OF CARE
Problem: Chronic Conditions and Co-morbidities  Goal: Patient's chronic conditions and co-morbidity symptoms are monitored and maintained or improved  1/11/2023 2029 by Manuel Yu RN  Outcome: Progressing  1/11/2023 1019 by Meena Pastrana RN  Outcome: Progressing  Flowsheets (Taken 1/11/2023 0815)  Care Plan - Patient's Chronic Conditions and Co-Morbidity Symptoms are Monitored and Maintained or Improved: Monitor and assess patient's chronic conditions and comorbid symptoms for stability, deterioration, or improvement

## 2023-01-12 NOTE — PATIENT CARE CONFERENCE
[facility] Quality Flow/Interdisciplinary Rounds Progress Note        Quality Flow Rounds held on January 12, 2023    Disciplines Attending:  Bedside Nurse, , , and Nursing Unit Leadership    Remington Oquendo was admitted on 1/8/2023  6:17 PM    Anticipated Discharge Date:       Disposition:    Jose Score:  Jose Scale Score: 13    Readmission Risk              Risk of Unplanned Readmission:  19           Discussed patient goal for the day, patient clinical progression, and barriers to discharge.   The following Goal(s) of the Day/Commitment(s) have been identified:  Diagnostics - Report Results and Labs - Report Results      Darcie Foster RN  January 12, 2023

## 2023-01-13 ENCOUNTER — APPOINTMENT (OUTPATIENT)
Dept: GENERAL RADIOLOGY | Age: 88
DRG: 292 | End: 2023-01-13
Payer: MEDICARE

## 2023-01-13 ENCOUNTER — APPOINTMENT (OUTPATIENT)
Dept: ULTRASOUND IMAGING | Age: 88
DRG: 292 | End: 2023-01-13
Payer: MEDICARE

## 2023-01-13 LAB
ANION GAP SERPL CALCULATED.3IONS-SCNC: 12 MMOL/L (ref 7–16)
APPEARANCE FLUID: NORMAL
BUN BLDV-MCNC: 44 MG/DL (ref 6–23)
CALCIUM SERPL-MCNC: 8.9 MG/DL (ref 8.6–10.2)
CELL COUNT FLUID TYPE: NORMAL
CHLORIDE BLD-SCNC: 102 MMOL/L (ref 98–107)
CO2: 27 MMOL/L (ref 22–29)
COLOR FLUID: YELLOW
CREAT SERPL-MCNC: 1 MG/DL (ref 0.7–1.2)
CRITICAL: ABNORMAL
DATE ANALYZED: ABNORMAL
DATE OF COLLECTION: ABNORMAL
FLUID TYPE: NORMAL
GFR SERPL CREATININE-BSD FRML MDRD: >60 ML/MIN/1.73
GLUCOSE BLD-MCNC: 100 MG/DL (ref 74–99)
GLUCOSE, FLUID: 116 MG/DL
LAB: ABNORMAL
LD, FLUID: 118 U/L
Lab: ABNORMAL
Lab: ABNORMAL
MONOCYTE, FLUID: 89 %
NEUTROPHIL, FLUID: 11 %
NUCLEATED CELLS FLUID: 64 /UL
OPERATOR ID: 1874
PH FLUID: ABNORMAL
POTASSIUM SERPL-SCNC: 3.8 MMOL/L (ref 3.5–5)
PROTEIN FLUID: 2.5 G/DL
RBC FLUID: <2000 /UL
SODIUM BLD-SCNC: 141 MMOL/L (ref 132–146)
SOURCE, BLOOD GAS: ABNORMAL
TIME ANALYZED: 1059

## 2023-01-13 PROCEDURE — 88112 CYTOPATH CELL ENHANCE TECH: CPT

## 2023-01-13 PROCEDURE — 88185 FLOWCYTOMETRY/TC ADD-ON: CPT

## 2023-01-13 PROCEDURE — 87205 SMEAR GRAM STAIN: CPT

## 2023-01-13 PROCEDURE — 6370000000 HC RX 637 (ALT 250 FOR IP): Performed by: INTERNAL MEDICINE

## 2023-01-13 PROCEDURE — 94640 AIRWAY INHALATION TREATMENT: CPT

## 2023-01-13 PROCEDURE — 83986 ASSAY PH BODY FLUID NOS: CPT

## 2023-01-13 PROCEDURE — 32555 ASPIRATE PLEURA W/ IMAGING: CPT | Performed by: INTERNAL MEDICINE

## 2023-01-13 PROCEDURE — 87102 FUNGUS ISOLATION CULTURE: CPT

## 2023-01-13 PROCEDURE — 6360000002 HC RX W HCPCS: Performed by: INTERNAL MEDICINE

## 2023-01-13 PROCEDURE — 89051 BODY FLUID CELL COUNT: CPT

## 2023-01-13 PROCEDURE — 2580000003 HC RX 258: Performed by: INTERNAL MEDICINE

## 2023-01-13 PROCEDURE — 83615 LACTATE (LD) (LDH) ENZYME: CPT

## 2023-01-13 PROCEDURE — 0W993ZZ DRAINAGE OF RIGHT PLEURAL CAVITY, PERCUTANEOUS APPROACH: ICD-10-PCS | Performed by: INTERNAL MEDICINE

## 2023-01-13 PROCEDURE — 87070 CULTURE OTHR SPECIMN AEROBIC: CPT

## 2023-01-13 PROCEDURE — 88184 FLOWCYTOMETRY/ TC 1 MARKER: CPT

## 2023-01-13 PROCEDURE — 36415 COLL VENOUS BLD VENIPUNCTURE: CPT

## 2023-01-13 PROCEDURE — 71045 X-RAY EXAM CHEST 1 VIEW: CPT

## 2023-01-13 PROCEDURE — 2700000000 HC OXYGEN THERAPY PER DAY

## 2023-01-13 PROCEDURE — 88305 TISSUE EXAM BY PATHOLOGIST: CPT

## 2023-01-13 PROCEDURE — 80048 BASIC METABOLIC PNL TOTAL CA: CPT

## 2023-01-13 PROCEDURE — 32555 ASPIRATE PLEURA W/ IMAGING: CPT

## 2023-01-13 PROCEDURE — 82947 ASSAY GLUCOSE BLOOD QUANT: CPT

## 2023-01-13 PROCEDURE — 99232 SBSQ HOSP IP/OBS MODERATE 35: CPT | Performed by: INTERNAL MEDICINE

## 2023-01-13 PROCEDURE — 2140000000 HC CCU INTERMEDIATE R&B

## 2023-01-13 PROCEDURE — 84157 ASSAY OF PROTEIN OTHER: CPT

## 2023-01-13 RX ADMIN — Medication 10 ML: at 08:24

## 2023-01-13 RX ADMIN — PANTOPRAZOLE SODIUM 40 MG: 40 TABLET, DELAYED RELEASE ORAL at 06:43

## 2023-01-13 RX ADMIN — METOPROLOL SUCCINATE 12.5 MG: 25 TABLET, EXTENDED RELEASE ORAL at 16:56

## 2023-01-13 RX ADMIN — Medication 10 ML: at 20:24

## 2023-01-13 RX ADMIN — METOPROLOL SUCCINATE 12.5 MG: 25 TABLET, EXTENDED RELEASE ORAL at 08:24

## 2023-01-13 RX ADMIN — FLUTICASONE PROPIONATE 1 SPRAY: 50 SPRAY, METERED NASAL at 08:25

## 2023-01-13 RX ADMIN — ASPIRIN 81 MG CHEWABLE TABLET 81 MG: 81 TABLET CHEWABLE at 20:24

## 2023-01-13 RX ADMIN — TAMSULOSIN HYDROCHLORIDE 0.4 MG: 0.4 CAPSULE ORAL at 20:24

## 2023-01-13 RX ADMIN — FUROSEMIDE 20 MG: 10 INJECTION, SOLUTION INTRAMUSCULAR; INTRAVENOUS at 08:24

## 2023-01-13 RX ADMIN — IPRATROPIUM BROMIDE AND ALBUTEROL SULFATE 1 AMPULE: .5; 2.5 SOLUTION RESPIRATORY (INHALATION) at 21:02

## 2023-01-13 RX ADMIN — FUROSEMIDE 20 MG: 10 INJECTION, SOLUTION INTRAMUSCULAR; INTRAVENOUS at 18:38

## 2023-01-13 ASSESSMENT — PAIN SCALES - GENERAL: PAINLEVEL_OUTOF10: 0

## 2023-01-13 NOTE — PROGRESS NOTES
[place]  Department of Pulmonary, Critical Care and Sleep Medicine  [address] [facility]  Department of Internal Medicine  Progress Note    SUBJECTIVE:  Seen and examined prior to thoracentesis, SOA this am with talking. Did not sleep well again last night due to shortness of breath. OBJECTIVE:  Vitals:    01/12/23 2318 01/13/23 0400 01/13/23 0731 01/13/23 0811   BP: (!) 94/57 121/70 (!) 102/53 119/85   Pulse: 81 79 70 86   Resp: 18 20 20    Temp: 97 °F (36.1 °C) 97.5 °F (36.4 °C) 97.5 °F (36.4 °C)    TempSrc: Temporal Temporal Temporal    SpO2: 95% 95% 96%    Weight:  147 lb 0.8 oz (66.7 kg)     Height:         Constitutional: Alert,     EENT: EOMI JOJO. MMM. No icterus. No thrush. Neck: Trachea was midline. Respiratory: Diminished in bases but otherwise clear. No use of accessory muscles. Cardiovascular: Irregular. Systolic murmur present  Pulses:  Equal bilaterally. Abdomen: Soft without organomegaly. No rebound, rigidity. No guarding. Lymphatic: No lymphadenopathy. Musculoskeletal: Without weakness or gross deficits  Extremities: 2-3+ edema bilaterally. Skin: Dressing intact to lesion on left foot  Neurological/Psychiatric: No acute psychosis. Cranial nerves are intact. DATA:    Monitor Strips:  Reviewed & discusses with technical team. No changes noted. RADIOLOGY:  Echocardiogram results reviewed. Left ventricle grossly normal in size. Proximal septal thickening. Normal LV segmental wall motion. Estimated left ventricular ejection fraction is 72±5%. Estimated wedge pressure is 19 mmHg as per Nagueh formula. There is doppler evidence of stage II diastolic dysfunction. The LAESV Index is >34 ml/m2. Normal right ventricular size and function. TAPSE is low normal    Mild mitral regurgitation is present. TAVR prosthetic valve. No doppler evidence of aortic regurgitation.     Moderate to severe tricuspid regurgitation. RVSP is 43 mmHg. Physiologic and/or trace pulmonic regurgitation present. Technically fair quality study. Technically difficult study due to patient''s body habitus. Compared to prior echo, changes noted. CBC with Differential:    Lab Results   Component Value Date/Time    WBC 6.9 01/08/2023 06:30 PM    RBC 4.25 01/08/2023 06:30 PM    HGB 12.6 01/08/2023 06:30 PM    HCT 39.3 01/08/2023 06:30 PM     01/08/2023 06:30 PM    MCV 92.5 01/08/2023 06:30 PM    MCH 29.6 01/08/2023 06:30 PM    MCHC 32.1 01/08/2023 06:30 PM    RDW 16.5 01/08/2023 06:30 PM    SEGSPCT 42 08/03/2011 07:55 AM    LYMPHOPCT 17.2 01/08/2023 06:30 PM    MONOPCT 9.8 01/08/2023 06:30 PM    BASOPCT 0.7 01/08/2023 06:30 PM    MONOSABS 0.68 01/08/2023 06:30 PM    LYMPHSABS 1.19 01/08/2023 06:30 PM    EOSABS 0.06 01/08/2023 06:30 PM    BASOSABS 0.05 01/08/2023 06:30 PM     CMP:    Lab Results   Component Value Date/Time     01/13/2023 05:31 AM    K 3.8 01/13/2023 05:31 AM    K 4.0 08/07/2022 04:40 PM     01/13/2023 05:31 AM    CO2 27 01/13/2023 05:31 AM    BUN 44 01/13/2023 05:31 AM    CREATININE 1.0 01/13/2023 05:31 AM    GFRAA >60 08/10/2022 05:33 AM    LABGLOM >60 01/13/2023 05:31 AM    GLUCOSE 100 01/13/2023 05:31 AM    GLUCOSE 90 08/03/2011 07:55 AM    PROT 6.1 01/08/2023 06:30 PM    LABALBU 3.0 01/08/2023 06:30 PM    LABALBU 4.3 08/03/2011 07:55 AM    CALCIUM 8.9 01/13/2023 05:31 AM    BILITOT 0.7 01/08/2023 06:30 PM    ALKPHOS 76 01/08/2023 06:30 PM    AST 22 01/08/2023 06:30 PM    ALT 12 01/08/2023 06:30 PM       Assessment:   Right sided pleural effusion   Emphysema  Elevated BNP  Atrial Fibrillation  S/P TAVR     Plan:   S/P thoracentesis, consistent with transudative effusion  Regarding his emphysema is not on any inhaled therapies at home, will order to [facility] prn to have available if needed. Increase pulm hygiene with IS and Flutter valve.    PT/OT       Evin Mann, DO

## 2023-01-13 NOTE — PLAN OF CARE
Problem: Discharge Planning  Goal: Discharge to home or other facility with appropriate resources  1/13/2023 0409 by Juvenal Monroy RN  Outcome: Progressing     Problem: Chronic Conditions and Co-morbidities  Goal: Patient's chronic conditions and co-morbidity symptoms are monitored and maintained or improved  1/13/2023 0409 by Juvenal Monroy RN  Outcome: Progressing     Problem: ABCDS Injury Assessment  Goal: Absence of physical injury  1/13/2023 0409 by Juvenal Monroy RN  Outcome: Progressing     Problem: Skin/Tissue Integrity  Goal: Absence of new skin breakdown  Description: 1. Monitor for areas of redness and/or skin breakdown  2. Assess vascular access sites hourly  3. Every 4-6 hours minimum:  Change oxygen saturation probe site  4. Every 4-6 hours:  If on nasal continuous positive airway pressure, respiratory therapy assess nares and determine need for appliance change or resting period.   1/13/2023 0409 by Juvenal Monroy RN  Outcome: Progressing     Problem: Safety - Adult  Goal: Free from fall injury  1/13/2023 0409 by Juvenal Monroy RN  Outcome: Progressing     Problem: Cardiovascular - Adult  Goal: Maintains optimal cardiac output and hemodynamic stability  1/13/2023 0409 by Juvenal Monroy RN  Outcome: Progressing     Problem: Pain  Goal: Verbalizes/displays adequate comfort level or baseline comfort level  1/13/2023 0409 by Juvenal Monroy RN  Outcome: Progressing     Problem: Nutrition Deficit:  Goal: Optimize nutritional status  1/13/2023 0409 by Juvenal Monroy RN  Outcome: Progressing

## 2023-01-13 NOTE — PROCEDURES
PATIENT:  [facility] [address] [facility] [id number]     DATE:  1/13/2023    INDICTATIONS: Darryl Osorio 80 y.o. male with pleural effusion found on imaging. PRE - OPERATIVE DIAGNOSIS:  Pleural Effusion    POST - OPERATIVE DIAGNOSIS:  Right sided Pleural Effusion    PERFORMED By:  Heidy Carreno DO, MD    ASSISTANT(S):  US Technician     CONSCENT:  Verbal consent obtained. Written consent obtained. After informed consent & appropriate time out protocol was noted & obtained. Risks/Benefits/Alternatives of the procedure were discussed including: infection, bleeding, pain, & pneumothorax. THORACENTESIS PROCEDURE DETAILS:  Patient understanding: patient states understanding of the procedure being performed. Time out: Immediately prior to procedure a \"time out\" was called to verify the correct. Patient was placed in a sitting position and ultrasound was done and site of maximum fluid collection was marked. PREPARATION: Patient was prepped and draped in the usual sterile fashion. ANESTHESIA: Lidocaine 1% without epinephrine, amount varied for local control. PROCEDURE NOTE: The patient was placed in the sitting position. US was then used to michael the fluid and depth was determined. Then the skin was prepped with Chloraprep solution and draped with sterile covers. For the procedure we used 1% plain lidocaine to anesthetize the skin, subcutaneous tissue and parietal pleura. After adequate anesthesia was accomplished. The plural catheter was advanced over a guide into the pleural space. The fluid was obtained without any difficulties and minimal blood loss. A total of 2L fluid was removed. The fluid was yellow in color. A dressing was applied to the incision area. FINDINGS/SAMPLES: We removed 2000 ml of straw colored/clear clear pleural fluid. The samples was sent for analysis. COMPLICATIONS:  None; patient tolerated the procedure well.     PLAN: Care will be taken to review the post procedure radiograph for pneumothorax & testing when available.     Jarrett Arevalo, DO

## 2023-01-13 NOTE — PLAN OF CARE
Problem: Chronic Conditions and Co-morbidities  Goal: Patient's chronic conditions and co-morbidity symptoms are monitored and maintained or improved  1/13/2023 1736 by Jennifer Harding RN  Outcome: Progressing  1/13/2023 1736 by Jennifer Harding RN  Outcome: Progressing  1/13/2023 0409 by Kei Haynes RN  Outcome: Progressing     Problem: Discharge Planning  Goal: Discharge to home or other facility with appropriate resources  1/13/2023 1736 by Jennifer Harding RN  Outcome: Progressing  1/13/2023 1736 by Jennifer Harding RN  Outcome: Progressing  1/13/2023 0409 by Kei Haynes RN  Outcome: Progressing     Problem: ABCDS Injury Assessment  Goal: Absence of physical injury  1/13/2023 1736 by Jennifer Harding RN  Outcome: Progressing  1/13/2023 1736 by Jennifer Harding RN  Outcome: Progressing  1/13/2023 0409 by Kei Haynes RN  Outcome: Progressing     Problem: Skin/Tissue Integrity  Goal: Absence of new skin breakdown  Description: 1. Monitor for areas of redness and/or skin breakdown  2. Assess vascular access sites hourly  3. Every 4-6 hours minimum:  Change oxygen saturation probe site  4. Every 4-6 hours:  If on nasal continuous positive airway pressure, respiratory therapy assess nares and determine need for appliance change or resting period.   1/13/2023 1736 by Jennifer Harding RN  Outcome: Progressing  1/13/2023 1736 by Jennifer Harding RN  Outcome: Progressing  1/13/2023 0409 by Kei Haynes RN  Outcome: Progressing

## 2023-01-13 NOTE — PLAN OF CARE
Problem: Chronic Conditions and Co-morbidities  Goal: Patient's chronic conditions and co-morbidity symptoms are monitored and maintained or improved  1/13/2023 1736 by Toma Scott RN  Outcome: Progressing  1/13/2023 0409 by Kimberly Torres RN  Outcome: Progressing     Problem: Discharge Planning  Goal: Discharge to home or other facility with appropriate resources  1/13/2023 1736 by Toma Scott RN  Outcome: Progressing  1/13/2023 0409 by Kimberly Torres RN  Outcome: Progressing     Problem: ABCDS Injury Assessment  Goal: Absence of physical injury  1/13/2023 1736 by Toma Scott RN  Outcome: Progressing  1/13/2023 0409 by Kimberly Torres RN  Outcome: Progressing     Problem: Skin/Tissue Integrity  Goal: Absence of new skin breakdown  Description: 1.  Monitor for areas of redness and/or skin breakdown  2.  Assess vascular access sites hourly  3.  Every 4-6 hours minimum:  Change oxygen saturation probe site  4.  Every 4-6 hours:  If on nasal continuous positive airway pressure, respiratory therapy assess nares and determine need for appliance change or resting period.  1/13/2023 1736 by Toma Scott RN  Outcome: Progressing  1/13/2023 0409 by Kimberly Torres RN  Outcome: Progressing     Problem: Safety - Adult  Goal: Free from fall injury  1/13/2023 1736 by Toma Scott RN  Outcome: Progressing  Flowsheets (Taken 1/13/2023 0839)  Free From Fall Injury: Instruct family/caregiver on patient safety  1/13/2023 0409 by Kimberly Torres RN  Outcome: Progressing

## 2023-01-13 NOTE — PROGRESS NOTES
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    Dizziness R42    Severe aortic stenosis I35.0    1st degree AV block I44.0    History of biliary duct stent placement Z98.890    Choledocholithiasis K80.50    Pancreatic cyst K86.2    Acute GI bleeding K92.2    Chronic cholecystitis K86.7    Acute diastolic CHF (congestive heart failure) ([facility]) I50.31    Acute on chronic diastolic (congestive) heart failure ([facility]) I50.33    Moderate protein-calorie malnutrition ([facility]) E44.0       SUBJECTIVE:  Peggye Canavan states he feels minimally better and remains quite weak with shortness of breath with any exertion. REVIEW OF SYSTEMS:  General ROS: negative for - fatigue, malaise,  weight gain or weight loss  Psychological ROS: negative for - anxiety , depression  Ophthalmic ROS: negative for - decreased vision or visual distortion. ENT ROS: negative  Allergy and Immunology ROS: negative  Hematological and Lymphatic ROS: negative  Endocrine: no heat or cold intolerance and no polyphagia, polydipsia, or polyuria  Respiratory ROS: positive for - shortness of breath  Cardiovascular ROS: positive for - dyspnea on exertion, edema, and irregular heartbeat. Gastrointestinal ROS: no abdominal pain, change in bowel habits, or black or bloody stools  Genito-Urinary ROS: no nocturia, dysuria, trouble voiding, frequency or hematuria  Musculoskeletal ROS: negative for- myalgias, arthralgias, or claudication  Neurological ROS: no TIA or stroke symptoms otherwise no significant change in symptoms or problems since yesterday as documented in previous progress notes.     SCHEDULED MEDICATIONS:   metoprolol succinate  12.5 mg Oral BID    fluticasone  1 spray Each Nostril Daily    aspirin  81 mg Oral Daily    ocuvite-lutein  1 capsule Oral Daily    tamsulosin  0.4 mg Oral Daily    furosemide  20 mg IntraVENous BID    sodium chloride flush  5-40 mL IntraVENous 2 times per day    enoxaparin  40 mg SubCUTAneous Daily    pantoprazole  40 mg Oral QAM AC       VITAL SIGNS:                                                                                                                          BP (!) 94/57   Pulse 81   Temp 97 °F (36.1 °C) (Temporal)   Resp 18   Ht 5' 8\" (1.727 m)   Wt 147 lb 11.3 oz (67 kg)   SpO2 95%   BMI 22.46 kg/m²   Patient Vitals for the past 96 hrs (Last 3 readings):   Weight   01/12/23 0438 147 lb 11.3 oz (67 kg)   01/10/23 0529 148 lb 9.4 oz (67.4 kg)   01/09/23 0015 152 lb 12.5 oz (69.3 kg)     OBJECTIVE:    HEENT: PERRL, EOM  Intact; sclera non-icteric, conjunctiva pink. Carotids are brisk in upstroke with normal contour. No carotid bruits. Normal jugular venous pulsation at 45°. No palpable cervical nor supraclavicular nodes. Thyroid not palpable. Trachea midline. Chest: Even excursion  Lungs: CTA B, no expiratory wheezes or rhonchi, no decreased tactile fremitus without inspiratory rales. Heart: Regular  rhythm; S1 > S2, no gallop or murmur. No clicks, rub, palpable thrills   or heaves. PMI nondisplaced, 5th intercostal space MCL. Abdomen: Soft, nontender, nondistended,  mildly protuberant, no masses or organomegaly. Bowel sounds active. Extremities: Without clubbing, cyanosis or edema. Pulses present 3+ upper extermities bilaterally; present 1+ DP  bilaterally and present 1+ PT bilaterally. Data:   Scheduled Meds: Reviewed  Continuous Infusions:    sodium chloride         Intake/Output Summary (Last 24 hours) at 1/12/2023 2358  Last data filed at 1/12/2023 2154  Gross per 24 hour   Intake 480 ml   Output 1850 ml   Net -1370 ml     CBC: No results for input(s): WBC, HGB, HCT, PLT in the last 72 hours.   BMP:  Recent Labs     01/10/23  0523 01/11/23  0552 01/12/23  0457    143 146   K 3.9 3.5 3.5    103 106   CO2 30* 30* 29   BUN 23 32* 41*   CREATININE 1.1 1.0 1.1 LABGLOM 59 >60 59     ABGs: No results found for: PH, PO2, PCO2  INR: No results for input(s): INR in the last 72 hours. PRO-BNP:   Lab Results   Component Value Date    PROBNP 2,973 (H) 01/08/2023    PROBNP 1,232 (H) 08/07/2022      TSH:   Lab Results   Component Value Date    TSH 2.460 12/09/2022      Cardiac Injury Profile: No results for input(s): TROPHS in the last 72 hours. Lipid Profile:   Lab Results   Component Value Date/Time    TRIG 70 12/09/2022 12:30 PM    HDL 43 12/09/2022 12:30 PM    LDLCALC 76 12/09/2022 12:30 PM    CHOL 133 12/09/2022 12:30 PM      Hemoglobin A1C: No components found for: HGBA1C      RAD:   No results found. EKG: See Report  Echo: See Report      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    Dizziness R42    Severe aortic stenosis I35.0    1st degree AV block I44.0    History of biliary duct stent placement Z98.890    Choledocholithiasis K80.50    Pancreatic cyst K86.2    Acute GI bleeding K92.2    Chronic cholecystitis X43.3    Acute diastolic CHF (congestive heart failure) ([facility]) I50.31    Acute on chronic diastolic (congestive) heart failure ([facility]) I50.33    Moderate protein-calorie malnutrition ([facility]) E44.0       RECOMMENDATIONS:  Continue conservative present medical management but concerned that he may indeed have stimulating pulmonary disease and concerned with need for antiplatelet therapy with to have our yet with underlying GI bleeding suspected. I have spent more than 25 minutes face to face with Pao Ferrer and reviewing notes and laboratory data, with greater than 50% of this time instructing and counseling the patient face to face regarding my findings and recommendations and I have answered all questions as posed to me by Mr. Stanton. Moni Samano, DO FACP,FACC,FSCAI      NOTE:  This report was transcribed using voice recognition software. Every effort was made to ensure accuracy; however, inadvertent computerized transcription errors may be present

## 2023-01-13 NOTE — PROGRESS NOTES
Occupational Therapy  Date:2023  Patient Name: Lin Valencia  MRN: [id number]  : 1919  Room: [address] [place] [address]-A     OT order received and appreciated. Chart reviewed. Pt off unit at time of attempted OT evaluation . Will follow.     Jessenia Young OTR/L #[id number]

## 2023-01-13 NOTE — CARE COORDINATION
"[facility]  Gynecology Problem Visit    SUBJECTIVE    Alva Joshi is a 28 year old G0 who is here for IUD insertion. On 1/31/20, she was seen in the [facility] ED for pelvic pain and found to have a partially expelled IUD. She presents today for insertion of a new IUD. Her previous IUD was placed in the OR and so she is a bit nervous to have it done without sedation.     Alva has a history of very heavy, painful periods. She first tried OCPs to manage her cycles. In 2009, she was found to have an ovarian cyst, and had a laparoscopic cystectomy, and at that time was also found to have endometriosis. In 2012, she had a second LSC \"clean out\" for endometriosis, and at that time switched to the Mirena IUD. She had a third LSC \"clean out\" in 2016 and her Mirena IUD was removed/replaced. She feels her periods are regular/monthly, but lighter and tolerable. She does not have pain outside of periods with the Mirena IUD. Since the Mirena was removed, she had one very heavy menses but is otherwise feeling well.    Her LMP is: Patient's last menstrual period was 02/11/2020.    PAST MEDICAL HISTORY  Past Medical History:   Diagnosis Date     Endometriosis      Gastro-oesophageal reflux disease      Migraines      Numbness and tingling     right shoulder \"Pins and Needles\"     PONV (postoperative nausea and vomiting)        MEDICATIONS  Current Outpatient Medications   Medication     benzonatate (TESSALON) 100 MG capsule     guaiFENesin-codeine (ROBITUSSIN AC) 100-10 MG/5ML SOLN solution     levonorgestrel (MIRENA) 20 MCG/24HR IUD     levonorgestrel (MIRENA) 20 MCG/24HR IUD     MINOCYCLINE HCL PO     No current facility-administered medications for this visit.      Past Surgical History:   Procedure Laterality Date     ARTHROSCOPY SHOULDER Right 9/2/2015    Procedure: ARTHROSCOPY SHOULDER;  Surgeon: Rod Leiva MD;  Location: [facility] OR     ENT SURGERY      sinus surgery     ENT SURGERY      deviated " Spoke with Dtr d/t Pt off floor for thoracentesis. Confirmed discharge plan is to return home with [facility] when medically stable. SW/CM to follow for discharge needs. Home Oxygen with [facility].    Xochitl Holguin, L.S.W.  [phone] "septum fixed     INSERT INTRAUTERINE DEVICE  12/19/2016    states also had a LSC \"pelvic clean out\" for endometriosis at this time - per op note in care everywhere, minimal endo seen/fulgarized     LAPAROSCOPIC CYSTECTOMY OVARIAN (BENIGN)  2009    ovarian cystectomy, diagnosed with endometriosis     LAPAROSCOPY  2012    had a LSC \"pelvic clean out\" for endometriosis and mirena IUD insertion     ALLERGIES  Allergies   Allergen Reactions     Augmentin Shortness Of Breath     Penicillins      Zithromax [Azithromycin] Diarrhea     FAMILY HISTORY  Family History   Problem Relation Age of Onset     Breast Cancer Maternal Grandmother      Diabetes Paternal Grandmother      Breast Cancer Other         great grandmother, 2 maternal great aunts - dx 30's, no genetic testing     Ovarian Cancer Other         paternal great aunt     Brain Cancer Mother         glioblastoma       REVIEW OF SYSTEMS  A 10 point review of systems including Constitutional, Eyes, Respiratory, Cardiovascular, Gastroenterology, Genitourinary, Integumentary, Musculoskeletal, and Psychiatric, were all negative, except for pertinent positives noted in the above HPI.    OBJECTIVE  Ht 1.524 m (5')   Wt 122.7 kg (270 lb 8 oz)   LMP 02/11/2020   BMI 52.83 kg/m      General: Alert, without distress  HEENT: normocephalic, without obvious abnormality   Cardiovascular: extremities warm and well-perfused   Lungs: normal work of breathing   Abdomen: soft, non-tender, non-distended, normal bowel sounds   Pelvic: normal external female genitalia; normal vagina without discharge; normal cervix without lesions/masses; uterus 6 week size, anteverted, mobile, nontender; adnexae nontender and without masses; normal anus/perineum   Extremities: normal    UPT negative today  Pap 12/2018 NIL     Mirena IUD Procedure Note  Alva Joshi and I discussed the risks and benefits of the Mirena IUD. We reviewed that the Mirena IUD is approved for 5 years and has contraceptive " efficacy superior to user-dependent methods such as oral contraceptives. Use of the Mirena is associated with irregular uterinebleeding in many women following its initial insertion. This typically improves after 3 to 6 months, when many women will note decreased menstrual flow or complete cessation of menses.    We discussed that the risks of IUD insertion include infection, especially during the first 40d after insertion, changes in menstrual flow, IUD expulsion, and uterine perforation. We discussed that no contraceptive method is 100% effective and therefore there is a possibility of contraceptive failure.     I also offered Alva a paracervical block to assist with the pain of insertion as she is a nulliparous woman. I discussed that it has been shown to ease pain with insertion, though the procedure is itself a bit uncomfortable. It is otherwise with minimal risk (reaction to lidocaine, no improvement in pain). Alva would like to try this.    After this discussion, I answered all of Alva 's questions and signed the consent form.    A speculum was placed into the vagina. The cervix was cleansed with betadine. The anterior lip of the cervix was grasped with a single-toothed tenaculum. A paracervical block ws performed with 10cc of 1% lidocaine. A uterine pipelle was passed into the uterine cavity and the uterus sounded to 6cm. The Mirena IUD was inserted according to  instructions without difficulty.The strings were trimmed to 3cm. The instruments were removed and the tenaculum sites hemostatic. She tolerated the procedure well.    ASSESSMENT  Alva Joshi is a 28 year old  who is here for a Mirena IUD insertion.    PLAN  Problem   Encounter for IUD Insertion    Uncomplicated insertion. Declined GC/CT as recent testing was negative and stable partner. Due to be removed 2025. Lot [id number]. String check in 6 weeks. Reviewed ibuprofen, heat packs for cramping, to call if heavy  bleeding/severe cramping, fever, or concerns of expulsion.     Family History of Malignant Neoplasm of Breast    Asked Alva to discuss HBOC testing with her affected family members. If they are unable/unwilling to test, will refer Alva to genetic counselor so that we can determine if testing is warranted and if earlier breast cancer screening is appropriate.         RTC 6 weeks for string check.     Elisabeth Alonso MD, MSCI    [facility]

## 2023-01-13 NOTE — PROGRESS NOTES
Subjective:    Chief complaint:  Seen earlier this morning  Supposed to have thoracentesis later today    Objective:    BP (!) 99/57   Pulse 72   Temp 97.1 °F (36.2 °C) (Temporal)   Resp 20   Ht 5' 8\" (1.727 m)   Wt 147 lb 0.8 oz (66.7 kg)   SpO2 96%   BMI 22.36 kg/m²   General : Awake ,alert,no distress. Heart: Irregular, no murmurs, gallops, or rubs. Lungs:  CTA bilaterally, no wheeze, rales or rhonchi  Abd: bowel sounds present, nontender, nondistended, no masses  Extrem:  No clubbing, cyanosis, 1+ edema bilaterally    CBC:   Lab Results   Component Value Date/Time    WBC 6.9 01/08/2023 06:30 PM    RBC 4.25 01/08/2023 06:30 PM    HGB 12.6 01/08/2023 06:30 PM    HCT 39.3 01/08/2023 06:30 PM    MCV 92.5 01/08/2023 06:30 PM    MCH 29.6 01/08/2023 06:30 PM    MCHC 32.1 01/08/2023 06:30 PM    RDW 16.5 01/08/2023 06:30 PM     01/08/2023 06:30 PM    MPV 9.7 01/08/2023 06:30 PM     BMP:    Lab Results   Component Value Date/Time     01/13/2023 05:31 AM    K 3.8 01/13/2023 05:31 AM    K 4.0 08/07/2022 04:40 PM     01/13/2023 05:31 AM    CO2 27 01/13/2023 05:31 AM    BUN 44 01/13/2023 05:31 AM    LABALBU 3.0 01/08/2023 06:30 PM    LABALBU 4.3 08/03/2011 07:55 AM    CREATININE 1.0 01/13/2023 05:31 AM    CALCIUM 8.9 01/13/2023 05:31 AM    GFRAA >60 08/10/2022 05:33 AM    LABGLOM >60 01/13/2023 05:31 AM    GLUCOSE 100 01/13/2023 05:31 AM    GLUCOSE 90 08/03/2011 07:55 AM     PT/INR:    Lab Results   Component Value Date/Time    PROTIME 13.6 01/08/2023 06:30 PM    INR 1.3 01/08/2023 06:30 PM     Troponin:    Lab Results   Component Value Date/Time    TROPONINI <0.01 02/01/2021 12:33 PM       No results for input(s): LABURIN in the last 72 hours. No results for input(s): BC in the last 72 hours. No results for input(s): Larisa Alas in the last 72 hours.       Current Facility-Administered Medications:     metoprolol succinate (TOPROL XL) extended release tablet 12.5 mg, 12.5 mg, Oral, BID, Sahara Restrepo Nicholson, DO, 12.5 mg at 01/13/23 0432    perflutren lipid microspheres (DEFINITY) injection 1.5 mL, 1.5 mL, IntraVENous, ONCE PRN, Arley Arias DO    fluticasone (FLONASE) 50 MCG/ACT nasal spray 1 spray, 1 spray, Each Nostril, Daily, Joselin Meier MD, 1 spray at 01/13/23 0825    ipratropium-albuterol (DUONEB) nebulizer solution 1 ampule, 1 ampule, Inhalation, Q4H PRN, Pb Jurado DO, 1 ampule at 01/12/23 1317    aspirin chewable tablet 81 mg, 81 mg, Oral, Daily, Sonu Mishra, DO, 81 mg at 01/12/23 2037    ocuvite-lutein multivitamin 1 capsule, 1 capsule, Oral, Daily, Fay Rajput DO, 1 capsule at 01/12/23 0818    tamsulosin (FLOMAX) capsule 0.4 mg, 0.4 mg, Oral, Daily, Sonu Mishra, DO, 0.4 mg at 01/12/23 2037    furosemide (LASIX) injection 20 mg, 20 mg, IntraVENous, BID, Sonu Mishra, DO, 20 mg at 01/13/23 2384    sodium chloride flush 0.9 % injection 5-40 mL, 5-40 mL, IntraVENous, 2 times per day, Fay Rajput DO, 10 mL at 01/13/23 0930    sodium chloride flush 0.9 % injection 5-40 mL, 5-40 mL, IntraVENous, PRN, Sonu Mishra, DO    0.9 % sodium chloride infusion, , IntraVENous, PRN, Sonu Mishra, DO    [Held by provider] enoxaparin (LOVENOX) injection 40 mg, 40 mg, SubCUTAneous, Daily, Sonu Mishra, DO, 40 mg at 01/12/23 0818    ondansetron (ZOFRAN-ODT) disintegrating tablet 4 mg, 4 mg, Oral, Q8H PRN **OR** ondansetron (ZOFRAN) injection 4 mg, 4 mg, IntraVENous, Q6H PRN, Sonu Mishra, DO    polyethylene glycol (GLYCOLAX) packet 17 g, 17 g, Oral, Daily PRN, Sonu Mishra,     acetaminophen (TYLENOL) tablet 650 mg, 650 mg, Oral, Q6H PRN **OR** acetaminophen (TYLENOL) suppository 650 mg, 650 mg, Rectal, Q6H PRN, Sonu Mishra, DO    pantoprazole (PROTONIX) tablet 40 mg, 40 mg, Oral, QAM AC, Sonu Mishra, , 40 mg at 01/13/23 1137    ADULT DIET; Regular;  Low Sodium (2 gm)  ADULT ORAL NUTRITION SUPPLEMENT; Lunch, Dinner; Standard High Calorie/High Protein Oral Supplement  ADULT ORAL NUTRITION SUPPLEMENT; Breakfast, Dinner; Wound Healing Oral Supplement    XR CHEST PORTABLE   Final Result   1. Decreased right pleural effusion post thoracentesis. Right pneumothorax,   estimated at 15-20%. Clinical correlation and follow-up study is suggested. 2. Moderate bilateral pleural effusions and bibasilar infiltrates. Mild   vascular congestion. US THORACENTESIS Which side should the procedure be performed? Right   Final Result   Ultrasound-guided marking for right thoracentesis. XR CHEST PORTABLE   Final Result   Moderate right pleural effusion and small left pleural effusion. Mild pneumonitis in right upper lobe. Suspect bilateral lower lobe atelectasis and/or pneumonitis. Assessment:    Principal Problem:    Acute on chronic diastolic (congestive) heart failure ([facility])  Active Problems: Moderate protein-calorie malnutrition ([address]  Resolved Problems:    * No resolved hospital problems. *  Right-sided pleural effusion      Plan:  Thoracentesis today for symptomatic control  Echocardiogram results noted        Bianca Villasenor MD  3:27 PM  1/13/2023    NOTE: This report was transcribed using voice recognition software.  Every effort was made to ensure accuracy; however, inadvertent transcription errors may be present

## 2023-01-13 NOTE — PATIENT CARE CONFERENCE
[facility] Quality Flow/Interdisciplinary Rounds Progress Note        Quality Flow Rounds held on January 13, 2023    Disciplines Attending:  Bedside Nurse, , , and Nursing Unit Leadership    Kaleb Stanton was admitted on 1/8/2023  6:17 PM    Anticipated Discharge Date:       Disposition:    Jose Score:  Jose Scale Score: 15    Readmission Risk              Risk of Unplanned Readmission:  19           Discussed patient goal for the day, patient clinical progression, and barriers to discharge.  The following Goal(s) of the Day/Commitment(s) have been identified:  Diagnostics - Report Results      Sravanthi Mar RN  January 13, 2023

## 2023-01-14 LAB
ANION GAP SERPL CALCULATED.3IONS-SCNC: 8 MMOL/L (ref 7–16)
BASOPHILS ABSOLUTE: 0.06 E9/L (ref 0–0.2)
BASOPHILS RELATIVE PERCENT: 0.9 % (ref 0–2)
BUN BLDV-MCNC: 42 MG/DL (ref 6–23)
CALCIUM SERPL-MCNC: 8.3 MG/DL (ref 8.6–10.2)
CHLORIDE BLD-SCNC: 100 MMOL/L (ref 98–107)
CO2: 31 MMOL/L (ref 22–29)
CREAT SERPL-MCNC: 1 MG/DL (ref 0.7–1.2)
EOSINOPHILS ABSOLUTE: 0.25 E9/L (ref 0.05–0.5)
EOSINOPHILS RELATIVE PERCENT: 3.7 % (ref 0–6)
GFR SERPL CREATININE-BSD FRML MDRD: >60 ML/MIN/1.73
GLUCOSE BLD-MCNC: 89 MG/DL (ref 74–99)
HCT VFR BLD CALC: 36.1 % (ref 37–54)
HEMOGLOBIN: 11.4 G/DL (ref 12.5–16.5)
IMMATURE GRANULOCYTES #: 0.09 E9/L
IMMATURE GRANULOCYTES %: 1.3 % (ref 0–5)
LYMPHOCYTES ABSOLUTE: 1.51 E9/L (ref 1.5–4)
LYMPHOCYTES RELATIVE PERCENT: 22.1 % (ref 20–42)
MCH RBC QN AUTO: 29.9 PG (ref 26–35)
MCHC RBC AUTO-ENTMCNC: 31.6 % (ref 32–34.5)
MCV RBC AUTO: 94.8 FL (ref 80–99.9)
MONOCYTES ABSOLUTE: 0.78 E9/L (ref 0.1–0.95)
MONOCYTES RELATIVE PERCENT: 11.4 % (ref 2–12)
NEUTROPHILS ABSOLUTE: 4.15 E9/L (ref 1.8–7.3)
NEUTROPHILS RELATIVE PERCENT: 60.6 % (ref 43–80)
PDW BLD-RTO: 16.4 FL (ref 11.5–15)
PLATELET # BLD: 229 E9/L (ref 130–450)
PMV BLD AUTO: 9.4 FL (ref 7–12)
POTASSIUM SERPL-SCNC: 3.3 MMOL/L (ref 3.5–5)
RBC # BLD: 3.81 E12/L (ref 3.8–5.8)
SODIUM BLD-SCNC: 139 MMOL/L (ref 132–146)
WBC # BLD: 6.8 E9/L (ref 4.5–11.5)

## 2023-01-14 PROCEDURE — 36415 COLL VENOUS BLD VENIPUNCTURE: CPT

## 2023-01-14 PROCEDURE — 2700000000 HC OXYGEN THERAPY PER DAY

## 2023-01-14 PROCEDURE — 6360000002 HC RX W HCPCS: Performed by: INTERNAL MEDICINE

## 2023-01-14 PROCEDURE — 80048 BASIC METABOLIC PNL TOTAL CA: CPT

## 2023-01-14 PROCEDURE — 6370000000 HC RX 637 (ALT 250 FOR IP): Performed by: INTERNAL MEDICINE

## 2023-01-14 PROCEDURE — 2140000000 HC CCU INTERMEDIATE R&B

## 2023-01-14 PROCEDURE — 99233 SBSQ HOSP IP/OBS HIGH 50: CPT | Performed by: INTERNAL MEDICINE

## 2023-01-14 PROCEDURE — 2580000003 HC RX 258: Performed by: INTERNAL MEDICINE

## 2023-01-14 PROCEDURE — 85025 COMPLETE CBC W/AUTO DIFF WBC: CPT

## 2023-01-14 RX ORDER — DILTIAZEM HYDROCHLORIDE 120 MG/1
120 CAPSULE, COATED, EXTENDED RELEASE ORAL DAILY
Status: DISCONTINUED | OUTPATIENT
Start: 2023-01-14 | End: 2023-01-16 | Stop reason: HOSPADM

## 2023-01-14 RX ORDER — POTASSIUM CHLORIDE 750 MG/1
10 TABLET, EXTENDED RELEASE ORAL 2 TIMES DAILY WITH MEALS
Status: DISCONTINUED | OUTPATIENT
Start: 2023-01-14 | End: 2023-01-16 | Stop reason: HOSPADM

## 2023-01-14 RX ORDER — BUMETANIDE 0.5 MG/1
0.5 TABLET ORAL 2 TIMES DAILY
Status: DISCONTINUED | OUTPATIENT
Start: 2023-01-15 | End: 2023-01-16 | Stop reason: HOSPADM

## 2023-01-14 RX ADMIN — Medication 10 ML: at 09:28

## 2023-01-14 RX ADMIN — Medication 10 ML: at 21:00

## 2023-01-14 RX ADMIN — DILTIAZEM HYDROCHLORIDE 120 MG: 120 CAPSULE, COATED, EXTENDED RELEASE ORAL at 20:59

## 2023-01-14 RX ADMIN — POTASSIUM CHLORIDE 10 MEQ: 750 TABLET, EXTENDED RELEASE ORAL at 11:24

## 2023-01-14 RX ADMIN — TAMSULOSIN HYDROCHLORIDE 0.4 MG: 0.4 CAPSULE ORAL at 20:59

## 2023-01-14 RX ADMIN — POTASSIUM CHLORIDE 10 MEQ: 750 TABLET, EXTENDED RELEASE ORAL at 16:19

## 2023-01-14 RX ADMIN — ASPIRIN 81 MG CHEWABLE TABLET 81 MG: 81 TABLET CHEWABLE at 20:59

## 2023-01-14 RX ADMIN — FLUTICASONE PROPIONATE 1 SPRAY: 50 SPRAY, METERED NASAL at 09:29

## 2023-01-14 RX ADMIN — ENOXAPARIN SODIUM 40 MG: 100 INJECTION SUBCUTANEOUS at 11:23

## 2023-01-14 RX ADMIN — PANTOPRAZOLE SODIUM 40 MG: 40 TABLET, DELAYED RELEASE ORAL at 05:28

## 2023-01-14 RX ADMIN — METOPROLOL SUCCINATE 12.5 MG: 25 TABLET, EXTENDED RELEASE ORAL at 09:28

## 2023-01-14 RX ADMIN — FUROSEMIDE 20 MG: 10 INJECTION, SOLUTION INTRAMUSCULAR; INTRAVENOUS at 09:28

## 2023-01-14 RX ADMIN — Medication 1 CAPSULE: at 09:28

## 2023-01-14 NOTE — PROGRESS NOTES
Pulmonary Critical Care Medicine           PULMONARY  CRITICAL CARE   SERVICE DAILY PROGRESS  NOTE     2023   Hospital LOS:  LOS: 6 days     Impression / Recommendations:    Chronic respiratory failure - multifactorial etiology -   R sided moderate Pleural effusion S/p Thoracentesis - transudative  + valvular heart disease + Emphysema  + Chronic Pulmonary vascular congestion ( A fib with HFpEF) + deconditioning and senility + malnutrition   S/p TVAR     Interval History/Event Changes:  Feels much better   Sitting on bed and eating during my exam   Communicates appropriately . Says - he is better after thoracentesis. Allergies  No Known Allergies    Review of Systems    A pertinent review of systems was performed and was otherwise non-contributory.     Vitals-     BP 91/62   Pulse 70   Temp 97.5 °F (36.4 °C)   Resp 18   Ht 5' 8\" (1.727 m)   Wt 153 lb 3.5 oz (69.5 kg)   SpO2 95%   BMI 23.30 kg/m²    Tmax: Temp (24hrs), Av.4 °F (36.3 °C), Min:97.1 °F (36.2 °C), Max:97.7 °F (36.5 °C)      Hemodynamics:  Cuff:   Systolic (86NRL), KND:48 , Min:85 , TDE:206   /Diastolic (48HOW), OXR:32, Min:50, Max:62    Cuff MAP:MAP (mmHg)  Av.9  Min: 67  Max: 103  P:   Pulse  Av.9  Min: 70  Max: 89    Airway:     Observed RR: Resp  Av.3  Min: 16  Max: 20   Observed O2 sats: SpO2  Av.2 %  Min: 94 %  Max: 100 %      Intake/Output Summary (Last 24 hours) at 2023 1634  Last data filed at 2023 0914  Gross per 24 hour   Intake 0 ml   Output 903 ml   Net -903 ml       Physical exam-  General appearance: thinly built ,  ill appearing, alert, no converstional dyspnea  Head: Normocephalic, without obvious abnormality, atraumatic   Eyes:Pupils bilateral equal and reactive, EOM intact, conjunctiva - no icterus , no injection   Throat: Clear, no lesions, Mallampti =1, no tonsillar eythema or edema  Neck: Supple, symmetrical, trachea midline, no lymphadenopathy, no JVD, no carotid bruits, no thyromegaly   Lungs: Bilateral  Air movement  decreased  ob bilateral R> L , normal femitus, resonant to percussion. Heart: RRR, S1, S2 normal, no murmur, click, rub or gallop   Abdomen: soft, non-tender, nondistended. Bowel sounds normal, no hepatomeagly  Extremities: extremities normal, atraumatic, no cyanosis, edema  Musculoskeletal - No deformities   Skin: Skin color, texture, turgor normal. No rashes or lesions   Neurological: No focal deficits, cranial nerves grossly intact, sensations intact   Psychiatric : Mood and effect normal , alert and oriented times 4      Routine labs:    Recent Labs     01/14/23  1123   WBC 6.8   HGB 11.4*   HCT 36.1*        Recent Labs     01/12/23  0457 01/13/23  0531 01/14/23  0430    141 139   K 3.5 3.8 3.3*    102 100   CO2 29 27 31*   BUN 41* 44* 42*   CREATININE 1.1 1.0 1.0         Other Laboratory - Imaging Studies:     Reviewed and as per electronic record. CxR/CT images reviewed by me when available.       Salem Primrose, MD  01/14/23

## 2023-01-14 NOTE — PATIENT CARE CONFERENCE
P Quality Flow/Interdisciplinary Rounds Progress Note        Quality Flow Rounds held on January 14, 2023    Disciplines Attending:  Bedside Nurse and Nursing Unit Leadership    Nishant Zee was admitted on 1/8/2023  6:17 PM    Anticipated Discharge Date:       Disposition:    Jose Score:  Jose Scale Score: 15    Readmission Risk              Risk of Unplanned Readmission:  21           Discussed patient goal for the day, patient clinical progression, and barriers to discharge.   The following Goal(s) of the Day/Commitment(s) have been identified:  Labs - Report Results      Stacey Garcia RN  January 14, 2023

## 2023-01-14 NOTE — PROGRESS NOTES
Subjective:    Chief complaint:  Daughter by the bedside  Patient reports improvement      Objective:    BP 91/62   Pulse 70   Temp 97.5 °F (36.4 °C)   Resp 18   Ht 5' 8\" (1.727 m)   Wt 153 lb 3.5 oz (69.5 kg)   SpO2 95%   BMI 23.30 kg/m²   General : Awake ,alert,no distress. Heart: Irregular, no murmurs, gallops, or rubs. Lungs:  CTA bilaterally, no wheeze, rales or rhonchi  Abd: bowel sounds present, nontender, nondistended, no masses  Extrem:  No clubbing, cyanosis, 1+ edema bilaterally    CBC:   Lab Results   Component Value Date/Time    WBC 6.8 01/14/2023 11:23 AM    RBC 3.81 01/14/2023 11:23 AM    HGB 11.4 01/14/2023 11:23 AM    HCT 36.1 01/14/2023 11:23 AM    MCV 94.8 01/14/2023 11:23 AM    MCH 29.9 01/14/2023 11:23 AM    MCHC 31.6 01/14/2023 11:23 AM    RDW 16.4 01/14/2023 11:23 AM     01/14/2023 11:23 AM    MPV 9.4 01/14/2023 11:23 AM     BMP:    Lab Results   Component Value Date/Time     01/14/2023 04:30 AM    K 3.3 01/14/2023 04:30 AM    K 4.0 08/07/2022 04:40 PM     01/14/2023 04:30 AM    CO2 31 01/14/2023 04:30 AM    BUN 42 01/14/2023 04:30 AM    LABALBU 3.0 01/08/2023 06:30 PM    LABALBU 4.3 08/03/2011 07:55 AM    CREATININE 1.0 01/14/2023 04:30 AM    CALCIUM 8.3 01/14/2023 04:30 AM    GFRAA >60 08/10/2022 05:33 AM    LABGLOM >60 01/14/2023 04:30 AM    GLUCOSE 89 01/14/2023 04:30 AM    GLUCOSE 90 08/03/2011 07:55 AM     PT/INR:    Lab Results   Component Value Date/Time    PROTIME 13.6 01/08/2023 06:30 PM    INR 1.3 01/08/2023 06:30 PM     Troponin:    Lab Results   Component Value Date/Time    TROPONINI <0.01 02/01/2021 12:33 PM       No results for input(s): LABURIN in the last 72 hours. No results for input(s): BC in the last 72 hours. No results for input(s): [place] Dunk in the last 72 hours.       Current Facility-Administered Medications:     dilTIAZem (CARDIZEM CD) extended release capsule 120 mg, 120 mg, Oral, Daily, Mike Harvey DO    [START ON 1/15/2023] bumetanide (BUMEX) tablet 0.5 mg, 0.5 mg, Oral, BID, Yoseph Nicholson DO    potassium chloride (KLOR-CON M) extended release tablet 10 mEq, 10 mEq, Oral, BID WC, Yoseph Nicholson DO, 10 mEq at 01/14/23 1124    perflutren lipid microspheres (DEFINITY) injection 1.5 mL, 1.5 mL, IntraVENous, ONCE PRN, Yoseph Nicholson DO    fluticasone (FLONASE) 50 MCG/ACT nasal spray 1 spray, 1 spray, Each Nostril, Daily, Vince Rajput MD, 1 spray at 01/14/23 0929    ipratropium-albuterol (DUONEB) nebulizer solution 1 ampule, 1 ampule, Inhalation, Q4H PRN, Donna Jurado DO, 1 ampule at 01/13/23 2102    aspirin chewable tablet 81 mg, 81 mg, Oral, Daily, Damian Mishra DO, 81 mg at 01/13/23 2024    ocuvite-lutein multivitamin 1 capsule, 1 capsule, Oral, Daily, Damian Mishra DO, 1 capsule at 01/14/23 0928    tamsulosin (FLOMAX) capsule 0.4 mg, 0.4 mg, Oral, Daily, Damian Mishra DO, 0.4 mg at 01/13/23 2024    sodium chloride flush 0.9 % injection 5-40 mL, 5-40 mL, IntraVENous, 2 times per day, Damian Mishra DO, 10 mL at 01/14/23 0928    sodium chloride flush 0.9 % injection 5-40 mL, 5-40 mL, IntraVENous, PRN, Damian Mishra DO    0.9 % sodium chloride infusion, , IntraVENous, PRN, Damian Mishra DO    enoxaparin (LOVENOX) injection 40 mg, 40 mg, SubCUTAneous, Daily, Damian Mishra DO, 40 mg at 01/14/23 1123    ondansetron (ZOFRAN-ODT) disintegrating tablet 4 mg, 4 mg, Oral, Q8H PRN **OR** ondansetron (ZOFRAN) injection 4 mg, 4 mg, IntraVENous, Q6H PRN, Damian M Malmer, DO    polyethylene glycol (GLYCOLAX) packet 17 g, 17 g, Oral, Daily PRN, Damian Mishra,     acetaminophen (TYLENOL) tablet 650 mg, 650 mg, Oral, Q6H PRN **OR** acetaminophen (TYLENOL) suppository 650 mg, 650 mg, Rectal, Q6H PRN, Damian Mishra,     pantoprazole (PROTONIX) tablet 40 mg, 40 mg, Oral, QAM AC, Damian Mishra, DO, 40 mg at 01/14/23 0528    ADULT DIET; Regular; Low Sodium (2 gm)  ADULT ORAL NUTRITION SUPPLEMENT;  Lunch, Dinner; Standard High Calorie/High Protein Oral Supplement  ADULT ORAL NUTRITION SUPPLEMENT; Breakfast, Dinner; Wound Healing Oral Supplement    XR CHEST PORTABLE   Final Result   1. Decreased right pleural effusion post thoracentesis. Right pneumothorax,   estimated at 15-20%. Clinical correlation and follow-up study is suggested. 2. Moderate bilateral pleural effusions and bibasilar infiltrates. Mild   vascular congestion. US THORACENTESIS Which side should the procedure be performed? Right   Final Result   Ultrasound-guided marking for right thoracentesis. XR CHEST PORTABLE   Final Result   Moderate right pleural effusion and small left pleural effusion. Mild pneumonitis in right upper lobe. Suspect bilateral lower lobe atelectasis and/or pneumonitis. Assessment:    Principal Problem:    Acute on chronic diastolic (congestive) heart failure (HCC)  Active Problems: Moderate protein-calorie malnutrition ([address]  Resolved Problems:    * No resolved hospital problems. *  Right-sided pleural effusion      Plan:  Patient is alert comfortable after thoracentesis  Increase activity as able  Replace potassium  Discharge planning    Julio César Ochoa MD  4:14 PM  1/14/2023    NOTE: This report was transcribed using voice recognition software.  Every effort was made to ensure accuracy; however, inadvertent transcription errors may be present

## 2023-01-14 NOTE — PROGRESS NOTES
Dr. Tita Thompson informed via perfect serve that the patient seems more lethargic this afternoon but does respond to verbal que's and does not have any other notable symptoms at this time. Will continue to monitor.

## 2023-01-14 NOTE — PLAN OF CARE
Problem: Chronic Conditions and Co-morbidities  Goal: Patient's chronic conditions and co-morbidity symptoms are monitored and maintained or improved  Outcome: Progressing  Flowsheets (Taken 1/14/2023 0930)  Care Plan - Patient's Chronic Conditions and Co-Morbidity Symptoms are Monitored and Maintained or Improved: Monitor and assess patient's chronic conditions and comorbid symptoms for stability, deterioration, or improvement     Problem: Discharge Planning  Goal: Discharge to home or other facility with appropriate resources  Outcome: Progressing  Flowsheets (Taken 1/14/2023 0930)  Discharge to home or other facility with appropriate resources: Identify barriers to discharge with patient and caregiver     Problem: ABCDS Injury Assessment  Goal: Absence of physical injury  Outcome: Progressing  Flowsheets (Taken 1/14/2023 1009)  Absence of Physical Injury: Implement safety measures based on patient assessment     Problem: Skin/Tissue Integrity  Goal: Absence of new skin breakdown  Description: 1. Monitor for areas of redness and/or skin breakdown  2. Assess vascular access sites hourly  3. Every 4-6 hours minimum:  Change oxygen saturation probe site  4. Every 4-6 hours:  If on nasal continuous positive airway pressure, respiratory therapy assess nares and determine need for appliance change or resting period.   Outcome: Progressing     Problem: Safety - Adult  Goal: Free from fall injury  Outcome: Progressing  Flowsheets (Taken 1/14/2023 1009)  Free From Fall Injury: Instruct family/caregiver on patient safety     Problem: Cardiovascular - Adult  Goal: Maintains optimal cardiac output and hemodynamic stability  Outcome: Progressing  Flowsheets (Taken 1/14/2023 0930)  Maintains optimal cardiac output and hemodynamic stability: Monitor blood pressure and heart rate     Problem: Metabolic/Fluid and Electrolytes - Adult  Goal: Hemodynamic stability and optimal renal function maintained  Outcome: Progressing  Flowsheets (Taken 1/14/2023 2830)  Hemodynamic stability and optimal renal function maintained: Monitor labs and assess for signs and symptoms of volume excess or deficit     Problem: Pain  Goal: Verbalizes/displays adequate comfort level or baseline comfort level  Outcome: Progressing     Problem: Nutrition Deficit:  Goal: Optimize nutritional status  Outcome: Progressing

## 2023-01-14 NOTE — PROGRESS NOTES
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 His daughter is at his bedside. REVIEW OF SYSTEMS:  General ROS: negative for - fatigue, malaise,  weight gain or weight loss  Psychological ROS: negative for - anxiety , depression  Ophthalmic ROS: negative for - decreased vision or visual distortion. ENT ROS: negative  Allergy and Immunology ROS: negative  Hematological and Lymphatic ROS: negative  Endocrine: no heat or cold intolerance and no polyphagia, polydipsia, or polyuria  Respiratory ROS: positive for - shortness of breath  Cardiovascular ROS: positive for - dyspnea on exertion and irregular heartbeat. Gastrointestinal ROS: no abdominal pain, change in bowel habits, or black or bloody stools  Genito-Urinary ROS: no nocturia, dysuria, trouble voiding, frequency or hematuria  Musculoskeletal ROS: negative for- myalgias, arthralgias, or claudication  Neurological ROS: no TIA or stroke symptoms otherwise no significant change in symptoms or problems since yesterday as documented in previous progress notes.     SCHEDULED MEDICATIONS:   dilTIAZem  120 mg Oral Daily    [START ON 1/15/2023] bumetanide  0.5 mg Oral BID    fluticasone  1 spray Each Nostril Daily    aspirin  81 mg Oral Daily    ocuvite-lutein  1 capsule Oral Daily    tamsulosin  0.4 mg Oral Daily sodium chloride flush  5-40 mL IntraVENous 2 times per day    [Held by provider] enoxaparin  40 mg SubCUTAneous Daily    pantoprazole  40 mg Oral QAM AC       VITAL SIGNS:                                                                                                                          BP (!) 97/52   Pulse 77   Temp 97.1 °F (36.2 °C) (Temporal)   Resp 16   Ht 5' 8\" (1.727 m)   Wt 153 lb 3.5 oz (69.5 kg)   SpO2 95%   BMI 23.30 kg/m²   Patient Vitals for the past 96 hrs (Last 3 readings):   Weight   01/14/23 0600 153 lb 3.5 oz (69.5 kg)   01/13/23 0400 147 lb 0.8 oz (66.7 kg)   01/12/23 0438 147 lb 11.3 oz (67 kg)     OBJECTIVE:    HEENT: PERRL, EOM  Intact; sclera non-icteric, conjunctiva pink. Carotids are brisk in upstroke with normal contour. No carotid bruits. Normal jugular venous pulsation at 45°. No palpable cervical nor supraclavicular nodes. Thyroid not palpable. Trachea midline. Chest: Even excursion  Lungs: Decreased right base no expiratory wheezes or rhonchi, no decreased tactile fremitus without inspiratory rales. Heart: Irregular, irregular rhythm; S1 > S2, no gallop or murmur. No clicks, rub, palpable thrills   or heaves. PMI nondisplaced, 5th intercostal space MCL. Abdomen: Soft, nontender, nondistended,  mildly protuberant, no masses or organomegaly. Bowel sounds active. Extremities: Without clubbing, cyanosis or edema. Pulses present 3+ upper extermities bilaterally; present 1+ DP  bilaterally and present 1+ PT bilaterally. Data:   Scheduled Meds: Reviewed  Continuous Infusions:    sodium chloride         Intake/Output Summary (Last 24 hours) at 1/14/2023 1050  Last data filed at 1/14/2023 0914  Gross per 24 hour   Intake 0 ml   Output 903 ml   Net -903 ml     CBC: No results for input(s): WBC, HGB, HCT, PLT in the last 72 hours.   BMP:  Recent Labs     01/12/23  0457 01/13/23  0531 01/14/23  0430    141 139   K 3.5 3.8 3.3*    102 100   CO2 29 27 31*   BUN 41* 44* 42*   CREATININE 1.1 1.0 1.0   LABGLOM 59 >60 >60     ABGs: No results found for: PH, PO2, PCO2  INR: No results for input(s): INR in the last 72 hours. PRO-BNP:   Lab Results   Component Value Date    PROBNP 2,973 (H) 01/08/2023    PROBNP 1,232 (H) 08/07/2022      TSH:   Lab Results   Component Value Date    TSH 2.460 12/09/2022      Cardiac Injury Profile: No results for input(s): TROPHS in the last 72 hours. Lipid Profile:   Lab Results   Component Value Date/Time    TRIG 70 12/09/2022 12:30 PM    HDL 43 12/09/2022 12:30 PM    LDLCALC 76 12/09/2022 12:30 PM    CHOL 133 12/09/2022 12:30 PM      Hemoglobin A1C: No components found for: HGBA1C      RAD:   No results found. EKG: See Report  Echo: 1/11/2023  Summary  Left ventricle grossly normal in size. Proximal septal thickening. Normal LV segmental wall motion. Estimated left ventricular ejection fraction is 72±5%. Estimated wedge pressure is 19 mmHg as per [place] formula. There is doppler evidence of stage II diastolic dysfunction. The LAESV Index is >34 ml/m2. Normal right ventricular size and function. TAPSE is low normal  Mild mitral regurgitation is present. TAVR prosthetic valve. No doppler evidence of aortic regurgitation. Moderate to severe tricuspid regurgitation. RVSP is 43 mmHg. Physiologic and/or trace pulmonic regurgitation present. Technically fair quality study. Technically difficult study due to patient''s body habitus. Compared to prior echo, changes noted. Suggest clinical correlation. Consider SBE prophylaxis.     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    Dizziness R42    Severe aortic stenosis I35.0    1st degree AV block I44.0    History of biliary duct stent placement Z98.890    Choledocholithiasis K80.50    Pancreatic cyst K86.2    Acute GI bleeding K92.2    Chronic cholecystitis K81.1    Acute diastolic CHF (congestive heart failure) ([facility]) I50.31    Acute on chronic diastolic (congestive) heart failure (HCC) I50.33    Moderate protein-calorie malnutrition ([facility]) E44.0       RECOMMENDATIONS:  Will initiate diltiazem with hope that this will avoid any bronchospasm which he demonstrated yesterday and will switch to oral diuretic. Hopefully this will stabilize and and would asked that physical therapy initiate ambulation and that we reinitiate anticoagulation in the presence of his underlying to have TAVR    I have spent more than 25 minutes face to face with Ann Kessler and reviewing notes and laboratory data, with greater than 50% of this time instructing and counseling the patient and his daughter face to face regarding my findings and recommendations and I have answered all questions as posed to me by Mr. Stanton's daughter    Guilherme Elder, DO FACP,FACC,FSCAI      NOTE:  This report was transcribed using voice recognition software.   Every effort was made to ensure accuracy; however, inadvertent computerized transcription errors may be present

## 2023-01-15 LAB
ANION GAP SERPL CALCULATED.3IONS-SCNC: 8 MMOL/L (ref 7–16)
BODY FLUID CULTURE, STERILE: NORMAL
BUN BLDV-MCNC: 41 MG/DL (ref 6–23)
CALCIUM SERPL-MCNC: 8.4 MG/DL (ref 8.6–10.2)
CHLORIDE BLD-SCNC: 102 MMOL/L (ref 98–107)
CO2: 29 MMOL/L (ref 22–29)
CREAT SERPL-MCNC: 1.1 MG/DL (ref 0.7–1.2)
GFR SERPL CREATININE-BSD FRML MDRD: 59 ML/MIN/1.73
GLUCOSE BLD-MCNC: 85 MG/DL (ref 74–99)
GRAM STAIN ORDERABLE: NORMAL
GRAM STAIN RESULT: NORMAL
POTASSIUM SERPL-SCNC: 3.8 MMOL/L (ref 3.5–5)
SODIUM BLD-SCNC: 139 MMOL/L (ref 132–146)

## 2023-01-15 PROCEDURE — 6370000000 HC RX 637 (ALT 250 FOR IP): Performed by: INTERNAL MEDICINE

## 2023-01-15 PROCEDURE — 80048 BASIC METABOLIC PNL TOTAL CA: CPT

## 2023-01-15 PROCEDURE — 2580000003 HC RX 258: Performed by: INTERNAL MEDICINE

## 2023-01-15 PROCEDURE — 99233 SBSQ HOSP IP/OBS HIGH 50: CPT | Performed by: INTERNAL MEDICINE

## 2023-01-15 PROCEDURE — 2700000000 HC OXYGEN THERAPY PER DAY

## 2023-01-15 PROCEDURE — 36415 COLL VENOUS BLD VENIPUNCTURE: CPT

## 2023-01-15 PROCEDURE — 6360000002 HC RX W HCPCS: Performed by: INTERNAL MEDICINE

## 2023-01-15 PROCEDURE — 2140000000 HC CCU INTERMEDIATE R&B

## 2023-01-15 RX ADMIN — Medication 10 ML: at 08:13

## 2023-01-15 RX ADMIN — TAMSULOSIN HYDROCHLORIDE 0.4 MG: 0.4 CAPSULE ORAL at 20:30

## 2023-01-15 RX ADMIN — POTASSIUM CHLORIDE 10 MEQ: 750 TABLET, EXTENDED RELEASE ORAL at 08:13

## 2023-01-15 RX ADMIN — Medication 10 ML: at 20:30

## 2023-01-15 RX ADMIN — FLUTICASONE PROPIONATE 1 SPRAY: 50 SPRAY, METERED NASAL at 08:14

## 2023-01-15 RX ADMIN — PANTOPRAZOLE SODIUM 40 MG: 40 TABLET, DELAYED RELEASE ORAL at 06:19

## 2023-01-15 RX ADMIN — ASPIRIN 81 MG CHEWABLE TABLET 81 MG: 81 TABLET CHEWABLE at 20:30

## 2023-01-15 RX ADMIN — ENOXAPARIN SODIUM 40 MG: 100 INJECTION SUBCUTANEOUS at 08:13

## 2023-01-15 RX ADMIN — Medication 1 CAPSULE: at 08:13

## 2023-01-15 RX ADMIN — BUMETANIDE 0.5 MG: 0.5 TABLET ORAL at 08:13

## 2023-01-15 RX ADMIN — POTASSIUM CHLORIDE 10 MEQ: 750 TABLET, EXTENDED RELEASE ORAL at 18:18

## 2023-01-15 ASSESSMENT — PAIN SCALES - GENERAL
PAINLEVEL_OUTOF10: 0

## 2023-01-15 NOTE — PROGRESS NOTES
Pulmonary Critical Care Medicine           PULMONARY  CRITICAL CARE   SERVICE DAILY PROGRESS  NOTE     1/15/2023   Hospital LOS:  LOS: 7 days     Impression / Recommendations:    Chronic respiratory failure - multifactorial etiology -   R sided moderate Pleural effusion S/p Thoracentesis - transudative  + valvular heart disease + Emphysema  + Chronic Pulmonary vascular congestion ( A fib with HFpEF) + deconditioning and senility + malnutrition     S/p TVAR     Interval History/Event Changes:  Lying down and sleeping   No new complains   Continues to have occasional SOB   Tolerating PO     Allergies  No Known Allergies    Review of Systems    A pertinent review of systems was performed and was otherwise non-contributory.     Vitals-     BP (!) 96/50 Comment: Manual BP  Pulse 73   Temp 98.6 °F (37 °C)   Resp 22   Ht 5' 8\" (1.727 m)   Wt 150 lb 1.6 oz (68.1 kg)   SpO2 94%   BMI 22.82 kg/m²    Tmax: Temp (24hrs), Av.4 °F (36.9 °C), Min:98.2 °F (36.8 °C), Max:98.6 °F (37 °C)      Hemodynamics:  Cuff:   Systolic (53CGI), XLP:64 , Min:92 , JMW:319   /Diastolic (53QFR), VALERY:37, Min:46, Max:66    Cuff MAP:MAP (mmHg)  Av.1  Min: 61  Max: 103  P:   Pulse  Av.4  Min: 70  Max: 80    Airway:     Observed RR: Resp  Av  Min: 18  Max: 22   Observed O2 sats: SpO2  Av %  Min: 92 %  Max: 100 %      Intake/Output Summary (Last 24 hours) at 1/15/2023 1645  Last data filed at 1/15/2023 1401  Gross per 24 hour   Intake 580 ml   Output --   Net 580 ml       Physical exam-  General appearance: thinly built ,  ill appearing, alert, no converstional dyspnea  Head: Normocephalic, without obvious abnormality, atraumatic   Eyes:Pupils bilateral equal and reactive, EOM intact, conjunctiva - no icterus , no injection   Throat: Clear, no lesions, Mallampti =1, no tonsillar eythema or edema  Neck: Supple, symmetrical, trachea midline, no lymphadenopathy, no JVD, no carotid bruits, no thyromegaly   Lungs: Bilateral Air movement  decreased  ob bilateral R> L , normal femitus, resonant to percussion. Heart: RRR, S1, S2 normal, no murmur, click, rub or gallop   Abdomen: soft, non-tender, nondistended. Bowel sounds normal, no hepatomeagly  Extremities: extremities normal, atraumatic, no cyanosis, edema  Musculoskeletal - No deformities   Skin: Skin color, texture, turgor normal. No rashes or lesions   Neurological: No focal deficits, cranial nerves grossly intact, sensations intact   Psychiatric : Mood and effect normal , alert and oriented times 4      Routine labs:    Recent Labs     01/14/23  1123   WBC 6.8   HGB 11.4*   HCT 36.1*        Recent Labs     01/13/23  0531 01/14/23  0430 01/15/23  0439    139 139   K 3.8 3.3* 3.8    100 102   CO2 27 31* 29   BUN 44* 42* 41*   CREATININE 1.0 1.0 1.1       Other Laboratory - Imaging Studies:     Reviewed and as per electronic record. CxR/CT images reviewed by me when available.       Sidney Gamboa MD  01/15/23

## 2023-01-15 NOTE — PATIENT CARE CONFERENCE
[facility] Quality Flow/Interdisciplinary Rounds Progress Note        Quality Flow Rounds held on January 15, 2023    Disciplines Attending:  Bedside Nurse and Nursing Unit Leadership    Darryl Osorio was admitted on 1/8/2023  6:17 PM    Anticipated Discharge Date:       Disposition:    Jose Score:  Jose Scale Score: 15    Readmission Risk              Risk of Unplanned Readmission:  21           Discussed patient goal for the day, patient clinical progression, and barriers to discharge.   The following Goal(s) of the Day/Commitment(s) have been identified:  Diagnostics - Report Results and Labs - Report Results      Leonarda Gracia RN  January 15, 2023

## 2023-01-15 NOTE — PROGRESS NOTES
Subjective:    Chief complaint:  No confusion currently  Daughter is by the bed side  Objective:    BP 94/60   Pulse 74   Temp 98.2 °F (36.8 °C)   Resp 20   Ht 5' 8\" (1.727 m)   Wt 150 lb 1.6 oz (68.1 kg)   SpO2 97%   BMI 22.82 kg/m²   General : Awake ,alert,no distress. Heart: Irregular, no murmurs, gallops, or rubs. Lungs:  CTA bilaterally, no wheeze, rales or rhonchi  Abd: bowel sounds present, nontender, nondistended, no masses  Extrem:  No clubbing, cyanosis, 1+ edema bilaterally    CBC:   Lab Results   Component Value Date/Time    WBC 6.8 01/14/2023 11:23 AM    RBC 3.81 01/14/2023 11:23 AM    HGB 11.4 01/14/2023 11:23 AM    HCT 36.1 01/14/2023 11:23 AM    MCV 94.8 01/14/2023 11:23 AM    MCH 29.9 01/14/2023 11:23 AM    MCHC 31.6 01/14/2023 11:23 AM    RDW 16.4 01/14/2023 11:23 AM     01/14/2023 11:23 AM    MPV 9.4 01/14/2023 11:23 AM     BMP:    Lab Results   Component Value Date/Time     01/15/2023 04:39 AM    K 3.8 01/15/2023 04:39 AM    K 4.0 08/07/2022 04:40 PM     01/15/2023 04:39 AM    CO2 29 01/15/2023 04:39 AM    BUN 41 01/15/2023 04:39 AM    LABALBU 3.0 01/08/2023 06:30 PM    LABALBU 4.3 08/03/2011 07:55 AM    CREATININE 1.1 01/15/2023 04:39 AM    CALCIUM 8.4 01/15/2023 04:39 AM    GFRAA >60 08/10/2022 05:33 AM    LABGLOM 59 01/15/2023 04:39 AM    GLUCOSE 85 01/15/2023 04:39 AM    GLUCOSE 90 08/03/2011 07:55 AM     PT/INR:    Lab Results   Component Value Date/Time    PROTIME 13.6 01/08/2023 06:30 PM    INR 1.3 01/08/2023 06:30 PM     Troponin:    Lab Results   Component Value Date/Time    TROPONINI <0.01 02/01/2021 12:33 PM       No results for input(s): LABURIN in the last 72 hours. No results for input(s): BC in the last 72 hours. No results for input(s): Bretta Dandy in the last 72 hours.       Current Facility-Administered Medications:     dilTIAZem (CARDIZEM CD) extended release capsule 120 mg, 120 mg, Oral, Daily, Guilherme Elder DO, 120 mg at 01/14/23 2059 bumetanide (BUMEX) tablet 0.5 mg, 0.5 mg, Oral, BID, Cheral Salmons, DO, 0.5 mg at 01/15/23 0813    potassium chloride (KLOR-CON M) extended release tablet 10 mEq, 10 mEq, Oral, BID WC, Cheral Salmons, DO, 10 mEq at 01/15/23 0813    perflutren lipid microspheres (DEFINITY) injection 1.5 mL, 1.5 mL, IntraVENous, ONCE PRN, Cheral Salmons, DO    fluticasone (FLONASE) 50 MCG/ACT nasal spray 1 spray, 1 spray, Each Nostril, Daily, Benjamin Hall MD, 1 spray at 01/15/23 7220    ipratropium-albuterol (DUONEB) nebulizer solution 1 ampule, 1 ampule, Inhalation, Q4H PRN, Carson Jurado, DO, 1 ampule at 01/13/23 2102    aspirin chewable tablet 81 mg, 81 mg, Oral, Daily, Jolaine Botray Malmer, DO, 81 mg at 01/14/23 2059    ocuvite-lutein multivitamin 1 capsule, 1 capsule, Oral, Daily, Jolaine Bougie Malmer, DO, 1 capsule at 01/15/23 0813    tamsulosin (FLOMAX) capsule 0.4 mg, 0.4 mg, Oral, Daily, Jolaine Bougie Malmer, DO, 0.4 mg at 01/14/23 2059    sodium chloride flush 0.9 % injection 5-40 mL, 5-40 mL, IntraVENous, 2 times per day, Farshad Griffith, DO, 10 mL at 01/15/23 0813    sodium chloride flush 0.9 % injection 5-40 mL, 5-40 mL, IntraVENous, PRN, Jolaine Bougie Malmer, DO    0.9 % sodium chloride infusion, , IntraVENous, PRN, Jolaine Bougie Malmer, DO    enoxaparin (LOVENOX) injection 40 mg, 40 mg, SubCUTAneous, Daily, Jolaine Botray Malmer, DO, 40 mg at 01/15/23 0813    ondansetron (ZOFRAN-ODT) disintegrating tablet 4 mg, 4 mg, Oral, Q8H PRN **OR** ondansetron (ZOFRAN) injection 4 mg, 4 mg, IntraVENous, Q6H PRN, Sandiine Ira Mishra, DO    polyethylene glycol (GLYCOLAX) packet 17 g, 17 g, Oral, Daily PRN, Yamilethlaine Ira Mishra, DO    acetaminophen (TYLENOL) tablet 650 mg, 650 mg, Oral, Q6H PRN **OR** acetaminophen (TYLENOL) suppository 650 mg, 650 mg, Rectal, Q6H PRN, Sandiine Ira Mishra, DO    pantoprazole (PROTONIX) tablet 40 mg, 40 mg, Oral, QAM AC, Vance Mishra, DO, 40 mg at 01/15/23 9915    ADULT DIET; Regular;  Low Sodium (2 gm)  ADULT ORAL NUTRITION SUPPLEMENT; Lunch, Dinner; Standard High Calorie/High Protein Oral Supplement  ADULT ORAL NUTRITION SUPPLEMENT; Breakfast, Dinner; Wound Healing Oral Supplement    XR CHEST PORTABLE   Final Result   1. Decreased right pleural effusion post thoracentesis. Right pneumothorax,   estimated at 15-20%. Clinical correlation and follow-up study is suggested. 2. Moderate bilateral pleural effusions and bibasilar infiltrates. Mild   vascular congestion. US THORACENTESIS Which side should the procedure be performed? Right   Final Result   Ultrasound-guided marking for right thoracentesis. XR CHEST PORTABLE   Final Result   Moderate right pleural effusion and small left pleural effusion. Mild pneumonitis in right upper lobe. Suspect bilateral lower lobe atelectasis and/or pneumonitis. Assessment:    Principal Problem:    Acute on chronic diastolic (congestive) heart failure (HCC)  Active Problems: Moderate protein-calorie malnutrition ([address]  Resolved Problems:    * No resolved hospital problems. *  Right-sided pleural effusion      Plan:  Doing better  Daughter wants STEFANIE teri Bray MD  12:34 PM  1/15/2023    NOTE: This report was transcribed using voice recognition software.  Every effort was made to ensure accuracy; however, inadvertent transcription errors may be present

## 2023-01-16 VITALS
TEMPERATURE: 97 F | DIASTOLIC BLOOD PRESSURE: 57 MMHG | SYSTOLIC BLOOD PRESSURE: 117 MMHG | RESPIRATION RATE: 18 BRPM | BODY MASS INDEX: 22.32 KG/M2 | HEART RATE: 86 BPM | HEIGHT: 68 IN | WEIGHT: 147.3 LBS | OXYGEN SATURATION: 97 %

## 2023-01-16 LAB
Lab: NORMAL
REPORT: NORMAL
THIS TEST SENT TO: NORMAL

## 2023-01-16 PROCEDURE — 2580000003 HC RX 258: Performed by: INTERNAL MEDICINE

## 2023-01-16 PROCEDURE — 6360000002 HC RX W HCPCS: Performed by: INTERNAL MEDICINE

## 2023-01-16 PROCEDURE — 97165 OT EVAL LOW COMPLEX 30 MIN: CPT

## 2023-01-16 PROCEDURE — 6370000000 HC RX 637 (ALT 250 FOR IP): Performed by: INTERNAL MEDICINE

## 2023-01-16 PROCEDURE — 97530 THERAPEUTIC ACTIVITIES: CPT

## 2023-01-16 PROCEDURE — 99232 SBSQ HOSP IP/OBS MODERATE 35: CPT | Performed by: INTERNAL MEDICINE

## 2023-01-16 PROCEDURE — 2700000000 HC OXYGEN THERAPY PER DAY

## 2023-01-16 PROCEDURE — 97535 SELF CARE MNGMENT TRAINING: CPT

## 2023-01-16 PROCEDURE — 97161 PT EVAL LOW COMPLEX 20 MIN: CPT

## 2023-01-16 RX ORDER — POTASSIUM CHLORIDE 750 MG/1
10 TABLET, EXTENDED RELEASE ORAL 2 TIMES DAILY WITH MEALS
Qty: 60 TABLET | Refills: 3
Start: 2023-01-16

## 2023-01-16 RX ORDER — DILTIAZEM HYDROCHLORIDE 120 MG/1
120 CAPSULE, COATED, EXTENDED RELEASE ORAL DAILY
Qty: 30 CAPSULE | Refills: 3 | Status: SHIPPED | OUTPATIENT
Start: 2023-01-16

## 2023-01-16 RX ORDER — BUMETANIDE 0.5 MG/1
0.5 TABLET ORAL 2 TIMES DAILY
Qty: 30 TABLET | Refills: 3
Start: 2023-01-16

## 2023-01-16 RX ADMIN — ENOXAPARIN SODIUM 40 MG: 100 INJECTION SUBCUTANEOUS at 08:37

## 2023-01-16 RX ADMIN — PANTOPRAZOLE SODIUM 40 MG: 40 TABLET, DELAYED RELEASE ORAL at 08:37

## 2023-01-16 RX ADMIN — BUMETANIDE 0.5 MG: 0.5 TABLET ORAL at 08:37

## 2023-01-16 RX ADMIN — FLUTICASONE PROPIONATE 1 SPRAY: 50 SPRAY, METERED NASAL at 08:40

## 2023-01-16 RX ADMIN — Medication 1 CAPSULE: at 08:37

## 2023-01-16 RX ADMIN — Medication 10 ML: at 08:38

## 2023-01-16 RX ADMIN — POTASSIUM CHLORIDE 10 MEQ: 750 TABLET, EXTENDED RELEASE ORAL at 08:37

## 2023-01-16 NOTE — PROGRESS NOTES
Comprehensive Nutrition Assessment    Type and Reason for Visit:  Reassess    Nutrition Recommendations/Plan:   Recommend to continue Ensure high protein supplement BID and Beto wound healing supplement BID to help meet increased nutritional needs. Malnutrition Assessment:  Malnutrition Status: Moderate malnutrition (01/09/23 1131)    Context:  Chronic Illness     Findings of the 6 clinical characteristics of malnutrition:  Energy Intake:  75% or less estimated energy requirements for 1 month or longer  Weight Loss:  No significant weight loss     Body Fat Loss:   (moderate) Orbital, Triceps   Muscle Mass Loss:   (moderate) Temples (temporalis), Clavicles (pectoralis & deltoids)  Fluid Accumulation:  No significant fluid accumulation     Strength:  Not Performed    Nutrition Assessment:    Patients po intake has been a little sporadic, averaging 50-75% of meals served ; noted pleural effusion s/p thoracentesis ; adm w/ SOB and leg swelling ; wounds noted ; hx of CHF/CVA/emphysema ; hx of Cm's esophagus with esophagitis ; 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 ; hx of multiple ERCP's ; pt also meets criteria for moderate malnutrition ; will continue ONS    Nutrition Related Findings:    I&Os (-5.1 L), 1-2+ edema, active BS, A&O x 4, muscle/fat wasting, redness to buttocks ; Wound Type: Open Wounds, Partial Thickness (wounds x 2)       Current Nutrition Intake & Therapies:    Average Meal Intake: 51-75%  Average Supplements Intake: 51-75%  ADULT DIET; Regular; Low Sodium (2 gm)  ADULT ORAL NUTRITION SUPPLEMENT; Lunch, Dinner; Standard High Calorie/High Protein Oral Supplement  ADULT ORAL NUTRITION SUPPLEMENT; Breakfast, Dinner; Wound Healing Oral Supplement    Anthropometric Measures:  Height: 5' 8\" (172.7 cm)  Ideal Body Weight (IBW): 154 lbs (70 kg)    Admission Body Weight: 144 lb (65.3 kg) (1/8, actual)  Current Body Weight: 147 lb (66.7 kg) (1/16, bedscale), 95.5 % IBW.     Current BMI (kg/m2): 22.4  Usual Body Weight:  (UTO ; EMR shows past weights of 144# bedscale on 8/11/22 and 133# standing scale on 2/22/21)                       BMI Categories: Normal Weight (BMI 22.0 to 24.9) age over 72    Estimated Daily Nutrient Needs:  Energy Requirements Based On: Formula  Weight Used for Energy Requirements: Admission  Energy (kcal/day): 4139-0236 (REE 1224 x 1.1 SF)  Weight Used for Protein Requirements: Current  Protein (g/day): 80-90 (1.2-1.4g/kg CBW)  Method Used for Fluid Requirements: 1 ml/kcal  Fluid (ml/day): 4929-0943    Nutrition Diagnosis:   Moderate malnutrition, In context of chronic illness related to cognitive or neurological impairment (2/2 advanced age) as evidenced by poor intake prior to admission, moderate loss of subcutaneous fat, moderate muscle loss    Nutrition Interventions:   Food and/or Nutrient Delivery: Continue Current Diet, Continue Oral Nutrition Supplement, Modify Oral Nutrition Supplement  Nutrition Education/Counseling: Education not indicated  Coordination of Nutrition Care: Continue to monitor while inpatient       Goals:  Previous Goal Met: Progressing toward Goal(s)  Goals: PO intake 75% or greater, by next RD assessment       Nutrition Monitoring and Evaluation:   Behavioral-Environmental Outcomes: None Identified  Food/Nutrient Intake Outcomes: Food and Nutrient Intake, Supplement Intake  Physical Signs/Symptoms Outcomes: Biochemical Data, Chewing or Swallowing, GI Status, Fluid Status or Edema, Hemodynamic Status, Meal Time Behavior, Nutrition Focused Physical Findings, Skin, Weight    Discharge Planning:     Too soon to determine     Arlette Bruce RD, LD  Contact: [phone]

## 2023-01-16 NOTE — PROGRESS NOTES
Dr. Monique Sifuentes notified regarding soft blood pressures today. Per Dr. Monique Sifuentes, hold tonight's dose of cardizem and bumex.

## 2023-01-16 NOTE — PROGRESS NOTES
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 His daughter is at his bedside. REVIEW OF SYSTEMS:  General ROS: negative for - fatigue, malaise,  weight gain or weight loss  Psychological ROS: negative for - anxiety , depression  Ophthalmic ROS: negative for - decreased vision or visual distortion. ENT ROS: negative  Allergy and Immunology ROS: negative  Hematological and Lymphatic ROS: negative  Endocrine: no heat or cold intolerance and no polyphagia, polydipsia, or polyuria  Respiratory ROS: positive for - shortness of breath  Cardiovascular ROS: positive for - dyspnea on exertion and irregular heartbeat. Gastrointestinal ROS: no abdominal pain, change in bowel habits, or black or bloody stools  Genito-Urinary ROS: no nocturia, dysuria, trouble voiding, frequency or hematuria  Musculoskeletal ROS: negative for- myalgias, arthralgias, or claudication  Neurological ROS: no TIA or stroke symptoms otherwise no significant change in symptoms or problems since yesterday as documented in previous progress notes.     SCHEDULED MEDICATIONS:   dilTIAZem  120 mg Oral Daily    bumetanide  0.5 mg Oral BID    potassium chloride  10 mEq Oral BID WC    fluticasone  1 spray Each Nostril Daily    aspirin  81 mg Oral Daily    ocuvite-lutein  1 capsule Oral Daily    tamsulosin  0.4 mg Oral Daily    sodium chloride flush  5-40 mL IntraVENous 2 times per day    enoxaparin  40 mg SubCUTAneous Daily    pantoprazole  40 mg Oral QAM AC       VITAL SIGNS:                                                                                                                          BP (!) 98/59   Pulse 74   Temp 98.4 °F (36.9 °C) (Temporal)   Resp 20   Ht 5' 8\" (1.727 m)   Wt 150 lb 1.6 oz (68.1 kg)   SpO2 98%   BMI 22.82 kg/m²   Patient Vitals for the past 96 hrs (Last 3 readings):   Weight   01/15/23 0229 150 lb 1.6 oz (68.1 kg)   01/14/23 0600 153 lb 3.5 oz (69.5 kg)   01/13/23 0400 147 lb 0.8 oz (66.7 kg)     OBJECTIVE:    HEENT: PERRL, EOM  Intact; sclera non-icteric, conjunctiva pink. Carotids are brisk in upstroke with normal contour. No carotid bruits. Normal jugular venous pulsation at 45°. No palpable cervical nor supraclavicular nodes. Thyroid not palpable. Trachea midline. Chest: Even excursion  Lungs: Decreased right base no expiratory wheezes or rhonchi, no decreased tactile fremitus without inspiratory rales. Heart: Irregular, irregular rhythm; S1 > S2, no gallop or murmur. No clicks, rub, palpable thrills   or heaves. PMI nondisplaced, 5th intercostal space MCL. Abdomen: Soft, nontender, nondistended,  mildly protuberant, no masses or organomegaly. Bowel sounds active. Extremities: Without clubbing, cyanosis or edema. Pulses present 3+ upper extermities bilaterally; present 1+ DP  bilaterally and present 1+ PT bilaterally.      Data:   Scheduled Meds: Reviewed  Continuous Infusions:    sodium chloride         Intake/Output Summary (Last 24 hours) at 1/16/2023 0243  Last data filed at 1/15/2023 2208  Gross per 24 hour   Intake 730 ml   Output 0 ml   Net 730 ml     CBC:   Recent Labs     01/14/23  1123   WBC 6.8   HGB 11.4*   HCT 36.1*        BMP:  Recent Labs     01/13/23  0531 01/14/23  0430 01/15/23  0439    139 139   K 3.8 3.3* 3.8    100 102   CO2 27 31* 29 BUN 44* 42* 41*   CREATININE 1.0 1.0 1.1   LABGLOM >60 >60 59     ABGs: No results found for: PH, PO2, PCO2  INR: No results for input(s): INR in the last 72 hours. PRO-BNP:   Lab Results   Component Value Date    PROBNP 2,973 (H) 01/08/2023    PROBNP 1,232 (H) 08/07/2022      TSH:   Lab Results   Component Value Date    TSH 2.460 12/09/2022      Cardiac Injury Profile: No results for input(s): TROPHS in the last 72 hours. Lipid Profile:   Lab Results   Component Value Date/Time    TRIG 70 12/09/2022 12:30 PM    HDL 43 12/09/2022 12:30 PM    LDLCALC 76 12/09/2022 12:30 PM    CHOL 133 12/09/2022 12:30 PM      Hemoglobin A1C: No components found for: HGBA1C      RAD:   No results found. EKG: See Report  Echo: 1/11/2023  Summary  Left ventricle grossly normal in size. Proximal septal thickening. Normal LV segmental wall motion. Estimated left ventricular ejection fraction is 72±5%. Estimated wedge pressure is 19 mmHg as per [place] formula. There is doppler evidence of stage II diastolic dysfunction. The LAESV Index is >34 ml/m2. Normal right ventricular size and function. TAPSE is low normal  Mild mitral regurgitation is present. TAVR prosthetic valve. No doppler evidence of aortic regurgitation. Moderate to severe tricuspid regurgitation. RVSP is 43 mmHg. Physiologic and/or trace pulmonic regurgitation present. Technically fair quality study. Technically difficult study due to patient''s body habitus. Compared to prior echo, changes noted. Suggest clinical correlation. Consider SBE prophylaxis.     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 Hopefully this will stabilize and and would asked that physical therapy initiate ambulation and that we reinitiate anticoagulation in the presence of his underlying to have TAVR    I have spent more than 25 minutes face to face with Herlinda Montalvo and reviewing notes and laboratory data, with greater than 50% of this time instructing and counseling the patient and his daughter face to face regarding my findings and recommendations and I have answered all questions as posed to me by Mr. Stanton's daughter    Jeanineedd Castorena,  FACP,FACC,FSCAI      NOTE:  This report was transcribed using voice recognition software.   Every effort was made to ensure accuracy; however, inadvertent computerized transcription errors may be present

## 2023-01-16 NOTE — PROGRESS NOTES
Occupational Therapy  OCCUPATIONAL THERAPY INITIAL EVALUATION    [address] [address] [address]      [id number]:[id number]                                                Patient Name: Prakash Herman  MRN: [id number]  : 1919  Room: [address] [place] [address]    Evaluating OT: Beth Boateng OTR/L #[id number]     Referring Provider: Porter Gill MD  Specific Provider Orders/Date: OT eval and treat 23    Diagnosis: Hypokalemia [E87.6]  Acute on chronic diastolic congestive heart failure ([address] [I50.33]  Acute on chronic diastolic (congestive) heart failure ([address] [I50.33]   Pt admitted to hospital with SOB    Surgery / Procedure: thoracentesis 23     Pertinent Medical History:  has a past medical history of Acute blood loss anemia, Anemia, Aortic stenosis, Arthritis, Cm's esophagus with esophagitis, Cancer ([address] Cellulitis, Dizziness, Easy bruising, Gastrointestinal hemorrhage, GERD (gastroesophageal reflux disease), H/O emphysema, Heart murmur, Heartburn, History of stress test, Lightheadedness, Macular degeneration, Passed out, Stroke ([address] and Unspecified cerebral artery occlusion with cerebral infarction.        Precautions:  Fall Risk, 4L O2, TAPS, bed alarm    Assessment of current deficits    [x] Functional mobility  [x]ADLs  [x] Strength               []Cognition    [x] Functional transfers   [x] IADLs         [x] Safety Awareness   [x]Endurance    [] Fine Coordination              [x] Balance      [] Vision/perception   []Sensation     []Gross Motor Coordination  [] ROM  [] Delirium                   [] Motor Control     OT PLAN OF CARE   OT POC based on physician orders, patient diagnosis and results of clinical assessment    Frequency/Duration 1-3 days/wk for 2 weeks PRN   Specific OT Treatment Interventions to include:   * Instruction/training on adapted ADL techniques and AE recommendations to increase functional independence within precautions       * Training on energy conservation strategies, correct breathing pattern and techniques to improve independence/tolerance for self-care routine  * Functional transfer/mobility training/DME recommendations for increased independence, safety, and fall prevention  * Patient/Family education to increase follow through with safety techniques and functional independence  * Recommendation of environmental modifications for increased safety with functional transfers/mobility and ADLs  * Cognitive retraining/development of therapeutic activities to improve problem solving, judgement, memory, and attention for increased safety/participation in ADL/IADL tasks  * Therapeutic exercise to improve motor endurance, ROM, and functional strength for ADLs/functional transfers  * Therapeutic activities to facilitate/challenge dynamic balance, stand tolerance for increased safety and independence with ADLs  * Therapeutic activities to facilitate gross/fine motor skills for increased independence with ADLs  * Neuro-muscular re-education: facilitation of righting/equilibrium reactions, midline orientation, scapular stability/mobility, normalization of muscle tone, and facilitation of volitional active controled movement    Recommended Adaptive Equipment:  TBD     Home Living: Pt lives alone in a 1 story home with ramp entrance and interior ramp   Bathroom setup: sponge bathes at sink    Equipment owned: w/w, w/c, rollator, sock aide, reacher, bsc    Prior Level of Function: mod I with ADLs , mod I with IADLs; ambulated with w/w vs rollator   Driving: no   Occupation: na    Pain Level: Pt denies pain this session; Therapist facilitated repositioning to address pain      Cognition: A&O: x3;  Follows 1 step directions   Memory:  fair   Sequencing:  fair   Problem solving:  fair-   Judgement/safety:  fair-     Functional Assessment:  AM-PAC Daily Activity Raw Score: 12/24   Initial Eval Status  Date: 1/16/23 Treatment Status  Date: STGs = LTGs  Time frame: 10-14 days   Feeding Set-up   Mod I   Grooming Minimal Assist   Supervision    UB Dressing Moderate Assist   Supervision    LB Dressing Dependent   Minimal Assist    Bathing Maximal Assist  Minimal Assist    Toileting Dependent   Minimal Assist    Bed Mobility  Supine to sit: Minimal Assist   Sit to supine: NT   Supine to sit: Stand by Assist   Sit to supine: Stand by Assist    Functional Transfers Maximal Assist   (Chair)    Mod A  (Bed)  Minimal Assist    Functional Mobility Moderate Assist     Short ambulation task in room with w/w   Minimal Assist    Balance Sitting:     Static:  SBA    Dynamic:Min A  Standing: mod - max A     Activity Tolerance F-  F+   Visual/  Perceptual wfl                    Hand Dominance right   Strength ROM Additional Info:    RUE  proximal 3-/5  Distal 3+/5 Shoulder limited  Elbow wfl fair  and fair FMC/dexterity noted during ADL tasks     LUE proximal 3-/5  Distal 3+/5 Shoulder limited  Elbow wfl Fair   and fair FMC/dexterity noted during ADL tasks     Hearing: [place]  Sensation:wfl  Tone: wfl  Edema:none noted     Comments: Upon arrival patient supine in bed and agreeable to OT Session - daughter present. Therapist educated pt on role of OT. At end of session, patient seated in chair with call light and phone within reach, all lines and tubes intact. Overall patient demonstrated decreased independence and safety during completion of ADL/functional transfer/mobility tasks. Pt would benefit from continued skilled OT to increase safety and independence with completion of ADL/IADL tasks for functional independence and quality of life.     Treatment: OT treatment provided this date includes: Facilitation of bed mobility, sitting balance at EOB (impacting ADLs; addressing posture, weight shifting, dynamic reaching), functional transfers (various surfaces: bed, chair), standing tolerance tasks (addressing posture, balance and activity tolerance while incorporating light functional reaching; impacting ADLs and functional activity) and short functional ambulation tasks with w/w (cuing on posture, w/w management and safety) - skilled cuing on sequencing, hand placement, posture, body mechanics, energy conservation techniques and safety. Therapist facilitated self-care retraining: UB/LB self-care tasks (gown, socks),  toileting hygiene task and grooming tasks while educating pt on sequencing, modified techniques, posture, safety and energy conservation techniques. Skilled monitoring of HR, O2 sats and pts response to treatment. Rehab Potential: Good  for established goals     Patient / Family Goal: return home      Patient and/or family were instructed on functional diagnosis, prognosis/goals and OT plan of care. Demonstrated fair- understanding. Eval Complexity: Low    Time In: 1040  Time Out: 1120  Total Treatment Time: 25 minutes    Min Units   OT Eval Low 97165  x  1   OT Eval Medium 39874      OT Eval High 08848      OT Re-Eval [id number]       Therapeutic Ex 40134       Therapeutic Activities 94200  14  1   ADL/Self Care 35990  11  1   Orthotic Management 76321       Manual [id number]     Neuro Re-Ed [id number]       Non-Billable Time          Evaluation Time additionally includes thorough review of current medical information, gathering information on past medical history/social history and prior level of function, interpretation of standardized testing/informal observation of tasks, assessment of data and development of plan of care and goals.           Misha Fernandez OTR/L #[id number]

## 2023-01-16 NOTE — CARE COORDINATION
Dtr Patricia Nolan is looking at [address] now. Hx with [facility] [address] [facility]. Requested that Dtr look for other choices in case facility is full. Referral made to [facility]. PT 13/24 Min. To Mod assist . Walked 3 feet in room mod assist with ww.  OT 12/24 Dependent lower body dressing. Informed [facility] [address] [facility] that Pt going to [facility] and to please follow. Discharge Plan is to [facility] when medically stable. SW/CM to follow for discharge needs. See soft chart for Envelope, Ambulance form, and PASRR. SW/CM to follow for discharge needs.    Pal Cortes, L.S.W.  [phone]

## 2023-01-16 NOTE — PLAN OF CARE
Problem: Chronic Conditions and Co-morbidities  Goal: Patient's chronic conditions and co-morbidity symptoms are monitored and maintained or improved  1/16/2023 1119 by Dorota Roper RN  Outcome: Progressing  1/16/2023 0549 by Sabina Hunter RN  Outcome: Progressing  Flowsheets  Taken 1/15/2023 2346  Care Plan - Patient's Chronic Conditions and Co-Morbidity Symptoms are Monitored and Maintained or Improved: Monitor and assess patient's chronic conditions and comorbid symptoms for stability, deterioration, or improvement  Taken 1/15/2023 2030  Care Plan - Patient's Chronic Conditions and Co-Morbidity Symptoms are Monitored and Maintained or Improved: Monitor and assess patient's chronic conditions and comorbid symptoms for stability, deterioration, or improvement     Problem: Discharge Planning  Goal: Discharge to home or other facility with appropriate resources  1/16/2023 1119 by Dorota Roper RN  Outcome: Progressing  1/16/2023 0549 by Sabina Hunter RN  Outcome: Progressing  Flowsheets  Taken 1/15/2023 2346  Discharge to home or other facility with appropriate resources: Identify barriers to discharge with patient and caregiver  Taken 1/15/2023 2030  Discharge to home or other facility with appropriate resources: Identify barriers to discharge with patient and caregiver     Problem: ABCDS Injury Assessment  Goal: Absence of physical injury  1/16/2023 1119 by Dorota Roper RN  Outcome: Progressing  1/16/2023 0549 by Sabina Hunter RN  Outcome: Progressing  Flowsheets  Taken 1/16/2023 0507  Absence of Physical Injury: Implement safety measures based on patient assessment  Taken 1/15/2023 2130  Absence of Physical Injury: Implement safety measures based on patient assessment     Problem: Skin/Tissue Integrity  Goal: Absence of new skin breakdown  Description: 1.  Monitor for areas of redness and/or skin breakdown  2.  Assess vascular access sites hourly  3.  Every 4-6 hours minimum:  Change  oxygen saturation probe site  4. Every 4-6 hours:  If on nasal continuous positive airway pressure, respiratory therapy assess nares and determine need for appliance change or resting period.   1/16/2023 0549 by Elizabeth Vázquez RN  Outcome: Progressing     Problem: Safety - Adult  Goal: Free from fall injury  1/16/2023 1119 by Holli Ford RN  Outcome: Progressing  1/16/2023 0549 by Elizabeth Vázquez RN  Outcome: Progressing  Flowsheets  Taken 1/16/2023 0507  Free From Fall Injury: Instruct family/caregiver on patient safety  Taken 1/15/2023 2130  Free From Fall Injury: Instruct family/caregiver on patient safety

## 2023-01-16 NOTE — PROGRESS NOTES
Subjective:    Chief complaint:  Breathing is stable  No new issues otherwise  Objective:    /66   Pulse 92   Temp 97 °F (36.1 °C) (Temporal)   Resp 18   Ht 5' 8\" (1.727 m)   Wt 147 lb 4.8 oz (66.8 kg)   SpO2 99%   BMI 22.40 kg/m²   General : Awake ,alert,no distress. Heart: Irregular, no murmurs, gallops, or rubs. Lungs:  CTA bilaterally, no wheeze, rales or rhonchi  Abd: bowel sounds present, nontender, nondistended, no masses  Extrem:  No clubbing, cyanosis, 1+ edema bilaterally    CBC:   Lab Results   Component Value Date/Time    WBC 6.8 01/14/2023 11:23 AM    RBC 3.81 01/14/2023 11:23 AM    HGB 11.4 01/14/2023 11:23 AM    HCT 36.1 01/14/2023 11:23 AM    MCV 94.8 01/14/2023 11:23 AM    MCH 29.9 01/14/2023 11:23 AM    MCHC 31.6 01/14/2023 11:23 AM    RDW 16.4 01/14/2023 11:23 AM     01/14/2023 11:23 AM    MPV 9.4 01/14/2023 11:23 AM     BMP:    Lab Results   Component Value Date/Time     01/15/2023 04:39 AM    K 3.8 01/15/2023 04:39 AM    K 4.0 08/07/2022 04:40 PM     01/15/2023 04:39 AM    CO2 29 01/15/2023 04:39 AM    BUN 41 01/15/2023 04:39 AM    LABALBU 3.0 01/08/2023 06:30 PM    LABALBU 4.3 08/03/2011 07:55 AM    CREATININE 1.1 01/15/2023 04:39 AM    CALCIUM 8.4 01/15/2023 04:39 AM    GFRAA >60 08/10/2022 05:33 AM    LABGLOM 59 01/15/2023 04:39 AM    GLUCOSE 85 01/15/2023 04:39 AM    GLUCOSE 90 08/03/2011 07:55 AM     PT/INR:    Lab Results   Component Value Date/Time    PROTIME 13.6 01/08/2023 06:30 PM    INR 1.3 01/08/2023 06:30 PM     Troponin:    Lab Results   Component Value Date/Time    TROPONINI <0.01 02/01/2021 12:33 PM       No results for input(s): LABURIN in the last 72 hours. No results for input(s): BC in the last 72 hours. No results for input(s): Torrington Carrel in the last 72 hours.       Current Facility-Administered Medications:     dilTIAZem (CARDIZEM CD) extended release capsule 120 mg, 120 mg, Oral, Daily, Azul Emerson DO, 120 mg at 01/14/23 2059 bumetanide (BUMEX) tablet 0.5 mg, 0.5 mg, Oral, BID, Warrick Severin, DO, 0.5 mg at 01/16/23 4790    potassium chloride (KLOR-CON M) extended release tablet 10 mEq, 10 mEq, Oral, BID WC, Warrick Severin, DO, 10 mEq at 01/16/23 5696    perflutren lipid microspheres (DEFINITY) injection 1.5 mL, 1.5 mL, IntraVENous, ONCE PRN, Warrick Severin, DO    fluticasone (FLONASE) 50 MCG/ACT nasal spray 1 spray, 1 spray, Each Nostril, Daily, Brianna Aparicio MD, 1 spray at 01/16/23 0840    ipratropium-albuterol (DUONEB) nebulizer solution 1 ampule, 1 ampule, Inhalation, Q4H PRN, Viviana Jurado, DO, 1 ampule at 01/13/23 2102    aspirin chewable tablet 81 mg, 81 mg, Oral, Daily, Claudine Littlemer, DO, 81 mg at 01/15/23 2030    ocuvite-lutein multivitamin 1 capsule, 1 capsule, Oral, Daily, Claudine Taylor Malmer, DO, 1 capsule at 01/16/23 4145    tamsulosin (FLOMAX) capsule 0.4 mg, 0.4 mg, Oral, Daily, Claudine Taylor Malmer, DO, 0.4 mg at 01/15/23 2030    sodium chloride flush 0.9 % injection 5-40 mL, 5-40 mL, IntraVENous, 2 times per day, Laure Cedeño, DO, 10 mL at 01/16/23 0877    sodium chloride flush 0.9 % injection 5-40 mL, 5-40 mL, IntraVENous, PRN, Claudine Taylor Malmer, DO    0.9 % sodium chloride infusion, , IntraVENous, PRN, Claudine Taylor Malmer, DO    enoxaparin (LOVENOX) injection 40 mg, 40 mg, SubCUTAneous, Daily, Claudinekatarina Ordonezs Malmer, DO, 40 mg at 01/16/23 0837    ondansetron (ZOFRAN-ODT) disintegrating tablet 4 mg, 4 mg, Oral, Q8H PRN **OR** ondansetron (ZOFRAN) injection 4 mg, 4 mg, IntraVENous, Q6H PRN, Claudine Claudia Malmer, DO    polyethylene glycol (GLYCOLAX) packet 17 g, 17 g, Oral, Daily PRN, Claudine Claudia Malmer, DO    acetaminophen (TYLENOL) tablet 650 mg, 650 mg, Oral, Q6H PRN **OR** acetaminophen (TYLENOL) suppository 650 mg, 650 mg, Rectal, Q6H PRN, Claudine Claudia Malmer, DO    pantoprazole (PROTONIX) tablet 40 mg, 40 mg, Oral, QAM AC, Claudine Mishra, DO, 40 mg at 01/16/23 0242    ADULT DIET; Regular;  Low Sodium (2 gm)  ADULT ORAL NUTRITION SUPPLEMENT; Lunch, Dinner; Standard High Calorie/High Protein Oral Supplement  ADULT ORAL NUTRITION SUPPLEMENT; Breakfast, Dinner; Wound Healing Oral Supplement    XR CHEST PORTABLE   Final Result   1. Decreased right pleural effusion post thoracentesis. Right pneumothorax,   estimated at 15-20%. Clinical correlation and follow-up study is suggested. 2. Moderate bilateral pleural effusions and bibasilar infiltrates. Mild   vascular congestion. US THORACENTESIS Which side should the procedure be performed? Right   Final Result   Ultrasound-guided marking for right thoracentesis. XR CHEST PORTABLE   Final Result   Moderate right pleural effusion and small left pleural effusion. Mild pneumonitis in right upper lobe. Suspect bilateral lower lobe atelectasis and/or pneumonitis. Assessment:    Principal Problem:    Acute on chronic diastolic (congestive) heart failure (HCC)  Active Problems: Moderate protein-calorie malnutrition ([address]  Resolved Problems:    * No resolved hospital problems. *  Right-sided pleural effusion      Plan:  Subacute rehab on discharge    Kasie Parsons MD  2:23 PM  1/16/2023    NOTE: This report was transcribed using voice recognition software.  Every effort was made to ensure accuracy; however, inadvertent transcription errors may be present

## 2023-01-16 NOTE — PROGRESS NOTES
Physical Therapy Initial Assessment     Name: Cristian Collins  : 1919  MRN: [id number]      Date of Service: 2023    Evaluating PT:  Clarisse Wallis PT, DPT [id number]    Room #:  4907/0565-L  Diagnosis:  Hypokalemia [E87.6]  Acute on chronic diastolic congestive heart failure (HCC) [I50.33]  Acute on chronic diastolic (congestive) heart failure ([address] [I50.33]  PMHx/PSHx:    Past Medical History:   Diagnosis Date    Acute blood loss anemia     Anemia     Aortic stenosis     Arthritis     Cm's esophagus with esophagitis     Scope every 2 years with Dr. Brittany Santacruz. Cancer [facility])     melanoma x3    Cellulitis     Dizziness     Easy bruising     Gastrointestinal hemorrhage 2015    GERD (gastroesophageal reflux disease)     H/O emphysema     Heart murmur     Heartburn     History of stress test     Lightheadedness     Macular degeneration     Passed out     Stroke [facility])     Unspecified cerebral artery occlusion with cerebral infarction      Procedure/Surgery:  none this admission   Reason for admission: SOB, Acute on chronic diastolic (congestive) heart failure (HCC)  Precautions:  fall risk, 4L O2 NC  Equipment Needs:  TBD    SUBJECTIVE:  Pt lives in alone in one story home with ramped entrance. Pt daughter present for IE, notes pt has difficulty performing ADLs at home and ambulated with rollator (primary) or [employer] PTA. Pt has family who checks in on him but is alone most of the time. Daughter notes hx of falls. Pt on O2 at baseline, 3-4L as needed. OBJECTIVE:   Initial Evaluation  Date: 23 Treatment Short Term/ Long Term   Goals   AM-PAC 6 Clicks      Was pt agreeable to Eval/treatment? Yes     Does pt have pain?  No c/o     Bed Mobility  Rolling: min A  Supine to sit: min A  Sit to supine: NT  Scooting: min A  Rolling: ind  Supine to sit: ind  Sit to supine: ind  Scooting: ind   Transfers Sit to stand: mod A<>max A  Stand to sit: mod A  Stand pivot: mod A FWW  Sit to stand: ind  Stand to sit: ind  Stand pivot: mod I FWW   Ambulation    3 ft in room with [employer] mod A  50 feet with FWW mod I    Stair negotiation: ascended and descended  NT  2 steps with JOSSELIN rail mod I    ROM BUE:  Refer to OT eval   BLE:  WNL     Strength BUE:  Refer to OT eval   BLE:  4+/5 globally  5/5   Balance Sitting EOB:  CGA  Dynamic Standing:  mod A FWW  Sitting EOB:  ind  Dynamic Standing:  mod I      Pt is A & O x 4  Sensation:  Pt denies numbness and tingling to extremities  Edema: min swelling in BLE     Vitals:  Blood Pressure at rest 123/66 Blood Pressure post session NT   Heart Rate at rest 92 BPM Heart Rate post session NT   SPO2 at rest 96% SPO2 post session 97%     Therapeutic Exercises:  none performed this visit    Patient education  Pt educated on safety with mobility, importance of OOB activity     Patient response to education:   Pt verbalized understanding Pt demonstrated skill Pt requires further education in this area   x x x     ASSESSMENT:    Conditions Requiring Skilled Therapeutic Intervention:    [x]Decreased strength     []Decreased ROM  [x]Decreased functional mobility  [x]Decreased balance   [x]Decreased endurance   [x]Decreased posture  []Decreased sensation  []Decreased coordination   []Decreased vision  []Decreased safety awareness   []Increased pain       Comments:  Pt in bed sleeping in arrival, able to easily wake and agreeable to PT/OT co-evaluation. Pt daughter present and able to assist with answering orientation questions but pt oriented and no confusion noted. Pt able to transfer to EOB with min A to scoot to EOB, SOB noted with all activity but SpO2 remained >96% throughout session today on 4L O2. Pt noted no dizziness with position changes but does note hx of. Pt able to stand from bed, transfer to bedside chair with mod A for sequencing and walker management. Pt required seated rest after transfer d/t SOB. Pt required increased assist to perform STS from bedside chair of low height.  Pt requested to remain in chair at end of session, left with call light in reach and all needs met. Treatment:  Patient practiced and was instructed in the following treatment:    Bed mobility: performed with cues for safety awareness and proper hand placement to promote improved functional independence. Transfer Training: STS from bed, bedside chair  Gait training: pivot/short distance ambulation trial in room to assess strength and improve tolerance of standing activity     Pt's/ family goals   1. Return home safely. Prognosis is fair for reaching above PT goals. Patient and or family understand(s) diagnosis, prognosis, and plan of care.   yes    PHYSICAL THERAPY PLAN OF CARE:    PT POC is established based on physician order and patient diagnosis     Referring provider/PT Order:    01/13/23 0915  PT eval and treat  Start:  01/13/23 0915,   End:  01/13/23 0915,   ONE TIME,   Standing Count:  1 Occurrences,   R         Vince Domitila Crook MD     Diagnosis:  Hypokalemia [E87.6]  Acute on chronic diastolic congestive heart failure (HCC) [I50.33]  Acute on chronic diastolic (congestive) heart failure (HCC) [I50.33]  Specific instructions for next treatment:  increase activity as tolerated     Current Treatment Recommendations:     [x] Strengthening to improve independence with functional mobility   [] ROM to improve independence with functional mobility   [x] Balance Training to improve static/dynamic balance and to reduce fall risk  [x] Endurance Training to improve activity tolerance during functional mobility   [x] Transfer Training to improve safety and independence with all functional transfers   [x] Gait Training to improve gait mechanics, endurance and assess need for appropriate assistive device  [] Stair Training in preparation for safe discharge home and/or into the community   [x] Positioning to prevent skin breakdown and contractures  [x] Safety and Education Training   [x] Patient/Caregiver Education   [] HEP  [] Other     PT long term treatment goals are located in above grid    Frequency of treatments: 2-5x/week x 1-2 weeks. Time in  1040  Time out  1120    Total Treatment Time  25 minutes     Evaluation Time includes thorough review of current medical information, gathering information on past medical history/social history and prior level of function, completion of standardized testing/informal observation of tasks, assessment of data and education on plan of care and goals.     CPT codes:  [x] Low Complexity PT evaluation 33164  [] Moderate Complexity PT evaluation 19050  [] High Complexity PT evaluation 26459  [] PT Re-evaluation 26782  [] Gait training 57335 -- minutes  [] Manual therapy [id number] -- minutes  [x] Therapeutic activities 28157 25 minutes  [] Therapeutic exercises 07821 -- minutes  [] Neuromuscular reeducation 21377 -- minutes     Esa Bucio, PT, DPT  [id number]

## 2023-01-16 NOTE — PROGRESS NOTES
[place]  Department of Pulmonary, Critical Care and Sleep Medicine  [address] [facility]  Department of Internal Medicine  Progress Note    SUBJECTIVE:  Patient seen and examined this morning while eating breakfast.  He reports that he does still get short winded at times however his breathing is significantly improved after the thoracentesis. Appetite adequate. OBJECTIVE:  Vitals:    01/16/23 0333 01/16/23 0508 01/16/23 0812 01/16/23 0830   BP: 111/63  (!) 99/54 (!) 104/58   Pulse: 87  82    Resp: 20  18    Temp: 98.4 °F (36.9 °C)  (!) 96.5 °F (35.8 °C)    TempSrc: Temporal  Temporal    SpO2: 97%  93%    Weight:  147 lb 4.8 oz (66.8 kg)     Height:         Constitutional: Alert,     EENT: EOMI JOJO. MMM. No icterus. No thrush. Voice stronger  Neck: Trachea was midline. Respiratory: Diminished in bases but otherwise clear. No use of accessory muscles. Cardiovascular: Irregular. Systolic murmur present  Pulses:  Equal bilaterally. Abdomen: Soft without organomegaly. No rebound, rigidity. No guarding. Lymphatic: No lymphadenopathy. Musculoskeletal: Without weakness or gross deficits  Extremities: 1-2+ edema bilaterally which appears to be significantly improving. Skin: Dressing intact to lesion on left foot  Neurological/Psychiatric: No acute psychosis. Cranial nerves are intact. DATA:    Monitor Strips:  Reviewed & discusses with technical team. No changes noted. RADIOLOGY:  Echocardiogram results reviewed. Left ventricle grossly normal in size. Proximal septal thickening. Normal LV segmental wall motion. Estimated left ventricular ejection fraction is 72±5%. Estimated wedge pressure is 19 mmHg as per [place] formula. There is doppler evidence of stage II diastolic dysfunction. The LAESV Index is >34 ml/m2. Normal right ventricular size and function.     TAPSE is low normal    Mild mitral regurgitation is present. TAVR prosthetic valve. No doppler evidence of aortic regurgitation. Moderate to severe tricuspid regurgitation. RVSP is 43 mmHg. Physiologic and/or trace pulmonic regurgitation present. Technically fair quality study. Technically difficult study due to patient''s body habitus. Compared to prior echo, changes noted. CBC with Differential:    Lab Results   Component Value Date/Time    WBC 6.8 01/14/2023 11:23 AM    RBC 3.81 01/14/2023 11:23 AM    HGB 11.4 01/14/2023 11:23 AM    HCT 36.1 01/14/2023 11:23 AM     01/14/2023 11:23 AM    MCV 94.8 01/14/2023 11:23 AM    MCH 29.9 01/14/2023 11:23 AM    MCHC 31.6 01/14/2023 11:23 AM    RDW 16.4 01/14/2023 11:23 AM    SEGSPCT 42 08/03/2011 07:55 AM    LYMPHOPCT 22.1 01/14/2023 11:23 AM    MONOPCT 11.4 01/14/2023 11:23 AM    BASOPCT 0.9 01/14/2023 11:23 AM    MONOSABS 0.78 01/14/2023 11:23 AM    LYMPHSABS 1.51 01/14/2023 11:23 AM    EOSABS 0.25 01/14/2023 11:23 AM    BASOSABS 0.06 01/14/2023 11:23 AM     CMP:    Lab Results   Component Value Date/Time     01/15/2023 04:39 AM    K 3.8 01/15/2023 04:39 AM    K 4.0 08/07/2022 04:40 PM     01/15/2023 04:39 AM    CO2 29 01/15/2023 04:39 AM    BUN 41 01/15/2023 04:39 AM    CREATININE 1.1 01/15/2023 04:39 AM    GFRAA >60 08/10/2022 05:33 AM    LABGLOM 59 01/15/2023 04:39 AM    GLUCOSE 85 01/15/2023 04:39 AM    GLUCOSE 90 08/03/2011 07:55 AM    PROT 6.1 01/08/2023 06:30 PM    LABALBU 3.0 01/08/2023 06:30 PM    LABALBU 4.3 08/03/2011 07:55 AM    CALCIUM 8.4 01/15/2023 04:39 AM    BILITOT 0.7 01/08/2023 06:30 PM    ALKPHOS 76 01/08/2023 06:30 PM    AST 22 01/08/2023 06:30 PM    ALT 12 01/08/2023 06:30 PM       Assessment:   Right sided pleural effusion-status postthoracentesis  Emphysema  Elevated BNP  Atrial Fibrillation  S/P TAVR     Plan:   Currently on 4 L nasal cannula. Continue to wean as tolerated.   Continue pulmonary hygiene with incentive spirometer and flutter valve  Recommend physical therapy and Occupational Therapy. Orders have been placed. I also discussed with the physical therapy department this morning as prior to hospitalization patient had been ambulating on his own with a walker at home. Agree with continuing diuresis with Bumex.   Discharge planning      [facility],

## 2023-01-16 NOTE — PLAN OF CARE
Problem: Chronic Conditions and Co-morbidities  Goal: Patient's chronic conditions and co-morbidity symptoms are monitored and maintained or improved  Outcome: Progressing  Flowsheets  Taken 1/15/2023 2346  Care Plan - Patient's Chronic Conditions and Co-Morbidity Symptoms are Monitored and Maintained or Improved: Monitor and assess patient's chronic conditions and comorbid symptoms for stability, deterioration, or improvement  Taken 1/15/2023 2030  Care Plan - Patient's Chronic Conditions and Co-Morbidity Symptoms are Monitored and Maintained or Improved: Monitor and assess patient's chronic conditions and comorbid symptoms for stability, deterioration, or improvement     Problem: Discharge Planning  Goal: Discharge to home or other facility with appropriate resources  Outcome: Progressing  Flowsheets  Taken 1/15/2023 2346  Discharge to home or other facility with appropriate resources: Identify barriers to discharge with patient and caregiver  Taken 1/15/2023 2030  Discharge to home or other facility with appropriate resources: Identify barriers to discharge with patient and caregiver     Problem: ABCDS Injury Assessment  Goal: Absence of physical injury  Outcome: Progressing  Flowsheets  Taken 1/16/2023 0507  Absence of Physical Injury: Implement safety measures based on patient assessment  Taken 1/15/2023 2130  Absence of Physical Injury: Implement safety measures based on patient assessment     Problem: Skin/Tissue Integrity  Goal: Absence of new skin breakdown  Description: 1. Monitor for areas of redness and/or skin breakdown  2. Assess vascular access sites hourly  3. Every 4-6 hours minimum:  Change oxygen saturation probe site  4. Every 4-6 hours:  If on nasal continuous positive airway pressure, respiratory therapy assess nares and determine need for appliance change or resting period.   Outcome: Progressing     Problem: Safety - Adult  Goal: Free from fall injury  Outcome: Progressing  Flowsheets  Taken 1/16/2023 0507  Free From Fall Injury: Instruct family/caregiver on patient safety  Taken 1/15/2023 2130  Free From Fall Injury: Instruct family/caregiver on patient safety

## 2023-01-16 NOTE — DISCHARGE INSTR - COC
Continuity of Care Form    Patient Name: Renetta Graff   :  1919  MRN:  [id number]    Admit date:  2023  Discharge date:  2023    Code Status Order: DNR-CCA   Advance Directives:     Admitting Physician:  Guero Meza MD  PCP: Mervat Myers MD    Discharging Nurse: [facility]/Room#: 9456/0776-N  Discharging Unit Phone Number: [phone]    Emergency Contact:   Extended Emergency Contact Information  Primary Emergency Contact: Brianne Fields  Address: [address] [place] [address]           Antwon Montelongo [address] [place] [address] Phone: [phone]  Mobile Phone: [phone]  Relation: Child   needed? No  Secondary Emergency Contact: [address] Phone: [phone]  Mobile Phone: [phone]  Relation: [place] [address] needed?  No    Past Surgical History:  Past Surgical History:   Procedure Laterality Date    CATARACT REMOVAL      CHOLECYSTECTOMY, LAPAROSCOPIC N/A 2021    LAPAROSCOPIC CHOLECYSTECTOMY performed by Shannan Viramontes MD at [address] [facility], COLON, DIAGNOSTIC      ERCP N/A 3/11/2020    ERCP STONE REMOVAL performed by Shannan Viramontes MD at [address] [place] [address]    ERCP  3/11/2020    ERCP SPHINCTER/PAPILLOTOMY performed by Shannan Viramontes MD at [address] [place] [address]    ERCP N/A 2020    ERCP STENT REMOVAL/EXCHANGE performed by Shannan Viramontes MD at [address]    ERCP N/A 2020    ERCP STONE REMOVAL performed by Shannan Viramontes MD at [address]    ERCP N/A 2/3/2021    ERCP STONE REMOVAL performed by Shannan Viramontes MD at [address] [place] [address]    ERCP N/A 2/3/2021    ERCP STENT REMOVAL/EXCHANGE performed by Shannan Viramontes MD at [address] [place] [address]    ERCP N/A 2/3/2021    ERCP DILATION BALLOON performed by Shannan Viramontes MD at [address] [place] [address]    ERCP N/A 2021    ERCP STENT REMOVAL performed by Shannan Viramontes MD at [address]    ERCP N/A 2021    ERCP STONE REMOVAL performed by Shannan Viramontes MD at [facility] [address] SURGERY  2013    Left hip     HEMORRHOID SURGERY      JOINT REPLACEMENT      left hip    TONSILLECTOMY AND ADENOIDECTOMY      UPPER GASTROINTESTINAL ENDOSCOPY         Immunization History:   Immunization History   Administered Date(s) Administered    COVID-19, MODERNA BLUE border, Primary or Immunocompromised, (age 12y+), IM, 100 mcg/0.5mL 01/12/2021    COVID-19, PFIZER Bivalent BOOSTER, DO NOT Dilute, (age 12y+), IM, 30 mcg/0.3 mL 10/10/2022    COVID-19, PFIZER GRAY top, DO NOT Dilute, (age 15 y+), IM, 30 mcg/0.3 mL 06/04/2022    COVID-19, PFIZER PURPLE top, DILUTE for use, (age 15 y+), 30mcg/0.3mL 10/14/2021    Influenza Virus Vaccine 10/06/2015       Active Problems:  Patient Active Problem List   Diagnosis Code    Gastroesophageal reflux disease with esophagitis K21.00    Vertebral artery occlusion I65.09    Dizziness R42    Severe aortic stenosis I35.0    1st degree AV block I44.0    History of biliary duct stent placement Z98.890    Choledocholithiasis K80.50    Pancreatic cyst K86.2    Acute GI bleeding K92.2    Chronic cholecystitis Z24.2    Acute diastolic CHF (congestive heart failure) (HCC) I50.31    Acute on chronic diastolic (congestive) heart failure (HCC) I50.33    Moderate protein-calorie malnutrition (HCC) E44.0       Isolation/Infection:   Isolation            No Isolation          Patient Infection Status       Infection Onset Added Last Indicated Last Indicated By Review Planned Expiration Resolved Resolved By    None active    Resolved    COVID-19 (Rule Out) 01/08/23 01/08/23 01/08/23 COVID-19, Rapid (Ordered)   01/08/23 Rule-Out Test Resulted    COVID-19 (Rule Out) 08/07/22 08/07/22 08/07/22 COVID-19, Rapid (Ordered)   08/07/22 Rule-Out Test Resulted    C-diff Rule Out 02/22/22 02/22/22 02/22/22 Clostridium difficile EIA (Ordered)   02/23/22 Rule-Out Test Resulted    COVID-19 (Rule Out) 02/19/21 02/19/21 02/19/21 Covid-19 Ambulatory (Ordered)   02/21/21 Rule-Out Test Resulted    COVID-19 (Rule Out) 12/08/20 12/08/20 12/08/20 COVID-19 Ambulatory (Ordered)   12/09/20 Rule-Out Test Resulted    C-diff Rule Out 03/10/20 03/10/20 03/10/20 Clostridium difficile, EIA (Ordered)   12/10/20 Cally Pathak RN    Specimen rejected by lab. Nurse Assessment:  Last Vital Signs: /66   Pulse 92   Temp 97 °F (36.1 °C) (Temporal)   Resp 18   Ht 5' 8\" (1.727 m)   Wt 147 lb 4.8 oz (66.8 kg)   SpO2 99%   BMI 22.40 kg/m²     Last documented pain score (0-10 scale): Pain Level: 0  Last Weight:   Wt Readings from Last 1 Encounters:   01/16/23 147 lb 4.8 oz (66.8 kg)     Mental Status:  oriented and alert    IV Access:  - None    Nursing Mobility/ADLs:  Walking   Assisted  Transfer  Assisted  Bathing  Assisted  Dressing  Assisted  Toileting  Assisted  Feeding  Assisted  Med Admin  Assisted  Med Delivery   whole and in applesauce    Wound Care Documentation and Therapy:  Wound 01/09/23 Foot Left;Dorsal (Active)   Wound Image   01/10/23 1350   Dressing Status Clean;Dry; Intact 01/16/23 0815   Wound Cleansed Cleansed with saline 01/16/23 0815   Dressing/Treatment Dry dressing;Xeroform;Roll gauze 01/16/23 0815   Dressing Change Due 01/17/23 01/16/23 0815   Wound Length (cm) 3.9 cm 01/10/23 1350   Wound Width (cm) 4 cm 01/10/23 1350   Wound Depth (cm) 0.1 cm 01/10/23 1350   Wound Surface Area (cm^2) 15.6 cm^2 01/10/23 1350   Wound Volume (cm^3) 1.56 cm^3 01/10/23 1350   Wound Assessment Other (Comment) 01/16/23 0815   Drainage Amount Small 01/15/23 1512   Drainage Description Serosanguinous 01/15/23 1512   Odor None 01/15/23 1512   Soniya-wound Assessment Dry/flaky; Blanchable erythema; Intact;Edematous 01/13/23 1515   Number of days: 7       Wound 01/10/23 Foot Anterior; Left (Active)   Dressing Status New dressing applied;Clean;Dry; Intact 01/14/23 1300   Wound Cleansed Cleansed with saline 01/14/23 1300   Drainage Amount Moderate 01/14/23 1300   Odor None 01/14/23 1300   Number of days: 6       Incision 02/25/21 Abdomen (Active)   Number of days: 690        Elimination:  Continence: Bowel: No  Bladder: No  Urinary Catheter: None   Colostomy/Ileostomy/Ileal Conduit: No       Date of Last BM: 1/16/2023    Intake/Output Summary (Last 24 hours) at 1/16/2023 1438  Last data filed at 1/16/2023 1126  Gross per 24 hour   Intake 250 ml   Output 250 ml   Net 0 ml     I/O last 3 completed shifts: In: 1 [P.O.:820; I.V.:10]  Out: 0     Safety Concerns: At Risk for Falls    Impairments/Disabilities:      Speech and Vision    Nutrition Therapy:  Current Nutrition Therapy:   - Oral Diet:  General    Routes of Feeding: Oral  Liquids: Thin Liquids  Daily Fluid Restriction: no  Last Modified Barium Swallow with Video (Video Swallowing Test): not done    Treatments at the Time of Hospital Discharge:   Respiratory Treatments:     Oxygen Therapy:  is on oxygen at 4 L/min per nasal cannula.   Ventilator:    - No ventilator support    Rehab Therapies: Physical Therapy and Occupational Therapy  Weight Bearing Status/Restrictions: No weight bearing restrictions  Other Medical Equipment (for information only, NOT a DME order):  wheelchair, walker, and hospital bed  Other Treatments: Battier cream to buttocks, left foot    Patient's personal belongings (please select all that are sent with patient):  Glasses, Dentures lower    RN SIGNATURE:  Electronically signed by Nivia Longo RN on 1/16/23 at 3:40 PM EST    CASE MANAGEMENT/SOCIAL WORK SECTION    Inpatient Status Date: ***    Readmission Risk Assessment Score:  Readmission Risk              Risk of Unplanned Readmission:  22           Discharging to Facility/ Agency   Name:   Address:  Phone:  Fax:    Dialysis Facility (if applicable)   Name:  Address:  Dialysis Schedule:  Phone:  Fax:    / signature: {Esignature:[id number]}    PHYSICIAN SECTION    Prognosis: {Prognosis:6983844536}    Condition at Discharge: [address] Patient Condition:[id number]}    Rehab Potential (if transferring to Rehab): {Prognosis:1476303169}    Recommended Labs or Other Treatments After Discharge: ***    Physician Certification: I certify the above information and transfer of Kamille Del Toro  is necessary for the continuing treatment of the diagnosis listed and that he requires {Admit to Appropriate Level of Care:68812} for {GREATER/LESS:650636256} 30 days.      Update Admission H&P: {CHP DME Changes in YGGMN:221694270}    PHYSICIAN SIGNATURE:  {Esignature:[id number]}

## 2023-01-16 NOTE — PATIENT CARE CONFERENCE
[facility] Quality Flow/Interdisciplinary Rounds Progress Note        Quality Flow Rounds held on January 16, 2023    Disciplines Attending:  Bedside Nurse, , , and Nursing Unit Leadership    Rosanna Brooke was admitted on 1/8/2023  6:17 PM    Anticipated Discharge Date:       Disposition:    Jose Score:  Jose Scale Score: 16    Readmission Risk              Risk of Unplanned Readmission:  22           Discussed patient goal for the day, patient clinical progression, and barriers to discharge.   The following Goal(s) of the Day/Commitment(s) have been identified:  Diagnostics - Report Results and Labs - Report Results      Martin Olson RN  January 16, 2023

## 2023-01-16 NOTE — CARE COORDINATION
Liaison Isa Gonzalez has accepted Pt and will provide transport.  time is 4pm. Informed Charge RN, Pt and Dtr Patricia Citizen. See soft chart for envelope and PASRR. Discharge Plan is to [facility].    YANIQUE BaldwinW.  [phone]

## 2023-01-18 LAB
FUNGUS STAIN: NORMAL
REPORT: NORMAL
THIS TEST SENT TO: NORMAL

## 2023-11-02 ENCOUNTER — APPOINTMENT (OUTPATIENT)
Dept: CARDIOLOGY | Facility: CLINIC | Age: 88
End: 2023-11-02

## (undated) DEVICE — 20 ML SYRINGE REGULAR TIP: Brand: MONOJECT

## (undated) DEVICE — SYSTEM BX CAP BILI RAP EXCHG CAP LOK DEV COMPATIBLE W/ OLY

## (undated) DEVICE — DOUBLE BASIN SET: Brand: MEDLINE INDUSTRIES, INC.

## (undated) DEVICE — PMI PTFE COATED LAPAROSCOPIC WIRE L-HOOK 33 CM: Brand: PMI

## (undated) DEVICE — SYRINGE MED 10ML TRNSLUC BRL PLUNG BLK MRK POLYPR CTRL

## (undated) DEVICE — GAUZE,SPONGE,POST-OP,4X3,STRL,LF: Brand: MEDLINE

## (undated) DEVICE — BLOCK BITE 60FR CAREGUARD

## (undated) DEVICE — GOWN ISOLATN REG YEL M WT MULTIPLY SIDETIE LEV 2

## (undated) DEVICE — KIT BEDSIDE REVITAL OX 500ML

## (undated) DEVICE — NEEDLE HYPO 25GA L1.5IN BLU POLYPR HUB S STL REG BVL STR

## (undated) DEVICE — TUBING, SUCTION, 1/4" X 10', STRAIGHT: Brand: MEDLINE

## (undated) DEVICE — FORCEPS ENDOSCP L230CM WRK CHN 2.8CM SHTH 2.4MM JAW OPN

## (undated) DEVICE — VALVE SUCTION AIR H2O HYDR H2O JET CONN STRL ORCA POD + DISP

## (undated) DEVICE — PACK SURG LAP CHOLE CUSTOM

## (undated) DEVICE — SPONGE GZ 4IN 4IN 4 PLY N WVN AVANT

## (undated) DEVICE — ELECTRODE PT RET AD L9FT HI MOIST COND ADH HYDRGEL CORDED

## (undated) DEVICE — PMI PTFE COATED LAPAROSCOPIC WIRE L-HOOK 44 CM: Brand: PMI

## (undated) DEVICE — INSUFFLATION NEEDLE TO ESTABLISH PNEUMOPERITONEUM.: Brand: INSUFFLATION NEEDLE

## (undated) DEVICE — SET ENDO INSTR RED YEL LAPAROSCOPIC

## (undated) DEVICE — RETRIEVAL BALLOON CATHETER: Brand: EXTRACTOR™ PRO RX

## (undated) DEVICE — GLOVE ORANGE PI 7 1/2   MSG9075

## (undated) DEVICE — CANNULA NSL ORAL AD FOR CAPNOFLEX CO2 O2 AIRLFE

## (undated) DEVICE — MARKER,SKIN,WI/RULER AND LABELS: Brand: MEDLINE

## (undated) DEVICE — GARMENT,MEDLINE,DVT,INT,CALF,MED, GEN2: Brand: MEDLINE

## (undated) DEVICE — LAPAROSCOPIC SCISSORS: Brand: EPIX LAPAROSCOPIC SCISSORS

## (undated) DEVICE — SPHINCTEROTOME: Brand: HYDRATOME RX 44

## (undated) DEVICE — TROCAR: Brand: KII FIOS FIRST ENTRY

## (undated) DEVICE — BITEBLOCK 54FR W/ DENT RIM BLOX

## (undated) DEVICE — BLOCK BITE 60FR RUBBER ADLT DENTAL

## (undated) DEVICE — MEDIA CONTRAST ISOVUE GLASS VIALS 250 50ML

## (undated) DEVICE — DRAPE 64X41IN RADIOLOGY C ARM EQUIP STER

## (undated) DEVICE — SET ENDO INSTR LAPAROSCOPIC INCISIONAL

## (undated) DEVICE — APPLIER CLP L SHFT DIA12MM 20 ROT MULT LIGACLP

## (undated) DEVICE — COVER,LIGHT HANDLE,FLX,1/PK: Brand: MEDLINE INDUSTRIES, INC.

## (undated) DEVICE — AGENT HEMSTAT W2XL4IN OXIDIZED REGENERATED CELOS ABSRB

## (undated) DEVICE — PLUMEPORT LAPAROSCOPIC SMOKE FILTRATION DEVICE: Brand: PLUMEPORT ACTIV

## (undated) DEVICE — FORCEPS GRASPING RESCUE 230CM ALLIGATOR

## (undated) DEVICE — APPLICATOR MEDICATED 26 CC SOLUTION HI LT ORNG CHLORAPREP

## (undated) DEVICE — [HIGH FLOW INSUFFLATOR,  DO NOT USE IF PACKAGE IS DAMAGED,  KEEP DRY,  KEEP AWAY FROM SUNLIGHT,  PROTECT FROM HEAT AND RADIOACTIVE SOURCES.]: Brand: PNEUMOSURE

## (undated) DEVICE — GUIDEWIRE ENDOSCP L260CM DIA0.035IN BILI STR RND TIP HI

## (undated) DEVICE — SYRINGE 20ML LL S/C 50

## (undated) DEVICE — CONTAINER SPEC COLL 960ML POLYPR TRIANG GRAD INTAKE/OUTPUT

## (undated) DEVICE — SHEET,DRAPE,40X58,STERILE: Brand: MEDLINE

## (undated) DEVICE — GOWN,SIRUS,NONRNF,SETINSLV,XL,20/CS: Brand: MEDLINE

## (undated) DEVICE — CAMERA STRYKER 1488 HD GEN

## (undated) DEVICE — DEFENDO AIR WATER SUCTION AND BIOPSY VALVE KIT FOR  OLYMPUS: Brand: DEFENDO AIR/WATER/SUCTION AND BIOPSY VALVE

## (undated) DEVICE — MEDIA CONTRAST ISOVUE  300 10X50ML

## (undated) DEVICE — PATIENT RETURN ELECTRODE, SINGLE-USE, CONTACT QUALITY MONITORING, ADULT, WITH 9FT CORD, FOR PATIENTS WEIGING OVER 33LBS. (15KG): Brand: MEGADYNE

## (undated) DEVICE — TROCAR: Brand: KII SLEEVE

## (undated) DEVICE — FORCEPS BX L240CM JAW DIA2.8MM L CAP W/ NDL MIC MESH TOOTH

## (undated) DEVICE — SYSTEM INJ BILI RAP REFIL CONT

## (undated) DEVICE — SINGLE-USE POLYPECTOMY SNARE: Brand: CAPTIVATOR

## (undated) DEVICE — Z DISCONTINUED NO SUB IDED BAG SPEC RETRV M C240ML MOUTH 7.3MM L17CM SHFT 10MM NYL EZEE

## (undated) DEVICE — SYRINGE MED 10ML LUERLOCK TIP W/O SFTY DISP

## (undated) DEVICE — THE DISPOSABLE RAPTOR GRASPING DEVICE IS USED TO GRASP TISSUE AND/OR RETRIEVE FOREIGN BODIES, EXCISED TISSUE AND STENTS DURING ENDOSCOPIC PROCEDURES.: Brand: RAPTOR

## (undated) DEVICE — TOWEL,OR,DSP,ST,BLUE,STD,6/PK,12PK/CS: Brand: MEDLINE

## (undated) DEVICE — SKIN AFFIX SURG ADHESIVE 72/CS 0.55ML: Brand: MEDLINE